# Patient Record
Sex: MALE | Race: OTHER | HISPANIC OR LATINO | Employment: UNEMPLOYED | URBAN - METROPOLITAN AREA
[De-identification: names, ages, dates, MRNs, and addresses within clinical notes are randomized per-mention and may not be internally consistent; named-entity substitution may affect disease eponyms.]

---

## 2017-12-17 ENCOUNTER — OFFICE VISIT (OUTPATIENT)
Dept: URGENT CARE | Facility: MEDICAL CENTER | Age: 37
End: 2017-12-17
Payer: COMMERCIAL

## 2017-12-17 PROCEDURE — G0382 LEV 3 HOSP TYPE B ED VISIT: HCPCS

## 2017-12-17 PROCEDURE — 99283 EMERGENCY DEPT VISIT LOW MDM: CPT

## 2017-12-18 NOTE — PROGRESS NOTES
Assessment  1  Trapezius strain (840 8) (C69 263A)   2  Lumbar radiculopathy (724 4) (M54 16)    Plan  Lumbar radiculopathy    · Cyclobenzaprine HCl - 10 MG Oral Tablet; Take 1 tablet twice daily   · PredniSONE 50 MG Oral Tablet; TAKE 1 TABLET DAILY AS DIRECTED    Discussion/Summary  Discussion Summary:   Symptoms are likely consistent with trapezius muscle spasm of the left side leading to shoulder pain and radicular symptoms  At this point I advised to use given medications as per instructions and apply heat locally  Follow up with PCP in 1 week  Patient's low back symptoms are likely consistent with left lumbar radiculopathy  Above medications are expected to help with the symptoms  Activities as tolerated and advised to follow up with your back doctor  Medication Side Effects Reviewed: Possible side effects of new medications were reviewed with the patient/guardian today  Understands and agrees with treatment plan: The treatment plan was reviewed with the patient/guardian  The patient/guardian understands and agrees with the treatment plan      Chief Complaint  1  Arm Pain   2  Neck Pain  Chief Complaint Free Text Note Form: Pt with complaints of pain left side of neck that radiates down left arm to hand for 4 days  Complaining of intermittant tingling of fingers of left hand  Describes pain as constant worse when raising arm  Denies injury  Also with complaints of left sided back pain since yesterday  History of Present Illness  HPI: Patient with past medical history of lumbar radiculopathy status post surgical repair coming in complaining of 4 day history of left neck and trapezius area which is going in the shoulder, left upper extremity along with numbness and tingling sensation of the fingers are as well  Denies any weakness of the hand   Denies any symptoms on the right side  Denies any injury or trauma and denies any fever or chills or vision changes  Patient also complains of pain in the lower back on the left side with some radiation to the groin  Denies any recent injury or trauma to the lower back  Denies any giving out sensation of the lower extremity  Denies any saddle anesthesia  Hospital Based Practices Required Assessment:  Pain Assessment  the patient states they have pain  The pain is located in the left shoulder  The patient describes the pain as aching  (on a scale of 0 to 10, the patient rates the pain at 9 )       Review of Systems  Focused-Male:  Constitutional: no fever or chills, feels well, no tiredness, no recent weight loss or weight gain  Active Problems  1  Asthma with acute exacerbation (493 92) (J45 901)   2  Chronic back pain greater than 3 months duration (724 5,338 29) (M54 9,G89 29)   3  Chronic low back pain (724 2,338 29) (M54 5,G89 29)   4  History of asthma (V12 69) (Z87 09)    Past Medical History  1  History of asthma (V12 69) (Z87 09)   2  History of sinusitis (V12 69) (Z87 09)    Family History  Father    1  Family history of Aplastic bone marrow   2  Family history of diabetes mellitus (V18 0) (Z83 3)    Social History   · Current every day smoker (305 1) (F17 200)   · Current every day smoker (305 1) (F17 200)   · Current every day smoker (305 1) (F17 200)   · No alcohol use   · No drug use    Surgical History  1  History of Back Surgery    Current Meds   1  No Reported Medications  Requested for: [id number] Recorded    Allergies  1  No Known Drug Allergies    Vitals  Signs   Recorded: [id number] 03:02PM   Temperature: 96 8 F  Heart Rate: 80  Respiration: 20  Systolic: 872  Diastolic: 76  Height: 5 ft 8 in  Weight: 149 lb   BMI Calculated: 22 66  BSA Calculated: 1 8  O2 Saturation: 100  Pain Scale: 9    Physical Exam   Constitutional  General appearance: No acute distress, well appearing and well nourished     Musculoskeletal  Gait and station: Normal    Inspection/palpation of joints, bones, and muscles: Abnormal  -- (Patient has mild to moderate restriction of active range of motion in all planes on the left side with moderate tightness and tenderness to palpation of trapezius muscle on the left side  Mild tightness and tenderness of paravertebral muscles of the C-spine on left side  Right-sided examination is relatively normal  There is mild localized tenderness to palpation of the coracoid process  Otherwise examination of subacromial bursa and rest of the shoulder is unremarkable  Negative Carmen and impingement  Hand , biceps and triceps 5/5  No tightness and tenderness to palpation of paravertebral muscles of the lumbar spine  Mild tenderness of left SI joint   DTR 2+ at knees and normal gait)      Signatures   Electronically signed by : DONTA Perdomo ; Dec 17 2017  4:36PM EST                       (Author)

## 2018-01-15 NOTE — MISCELLANEOUS
Provider Comments  Provider Comments:   Patient no showed appt today        Signatures   Electronically signed by : Michael Arenas; Feb 4 2016  1:24PM EST                       (Author)    Electronically signed by : Adelita Goldberg, M D ; Feb 4 2016  1:44PM EST

## 2018-01-23 VITALS
HEART RATE: 80 BPM | DIASTOLIC BLOOD PRESSURE: 76 MMHG | BODY MASS INDEX: 22.58 KG/M2 | RESPIRATION RATE: 20 BRPM | OXYGEN SATURATION: 100 % | TEMPERATURE: 96.8 F | WEIGHT: 149 LBS | HEIGHT: 68 IN | SYSTOLIC BLOOD PRESSURE: 134 MMHG

## 2019-07-07 ENCOUNTER — OFFICE VISIT (OUTPATIENT)
Dept: URGENT CARE | Age: 39
End: 2019-07-07
Payer: COMMERCIAL

## 2019-07-07 VITALS
WEIGHT: 147 LBS | SYSTOLIC BLOOD PRESSURE: 116 MMHG | TEMPERATURE: 97 F | HEIGHT: 68 IN | OXYGEN SATURATION: 97 % | DIASTOLIC BLOOD PRESSURE: 64 MMHG | BODY MASS INDEX: 22.28 KG/M2 | RESPIRATION RATE: 16 BRPM | HEART RATE: 64 BPM

## 2019-07-07 DIAGNOSIS — G89.29 OTHER CHRONIC PAIN: Primary | ICD-10-CM

## 2019-07-07 PROCEDURE — 99214 OFFICE O/P EST MOD 30 MIN: CPT | Performed by: PHYSICIAN ASSISTANT

## 2019-07-07 RX ORDER — OXYCODONE HYDROCHLORIDE 10 MG/1
10 TABLET ORAL EVERY 6 HOURS PRN
COMMUNITY
Start: 2016-01-16 | End: 2021-12-08 | Stop reason: ALTCHOICE

## 2019-07-07 RX ORDER — METHOCARBAMOL 500 MG/1
500 TABLET, FILM COATED ORAL 3 TIMES DAILY
Qty: 45 TABLET | Refills: 0 | Status: SHIPPED | OUTPATIENT
Start: 2019-07-07 | End: 2019-07-25 | Stop reason: SDUPTHER

## 2019-07-07 RX ORDER — PREGABALIN 150 MG/1
1 CAPSULE ORAL 2 TIMES DAILY
COMMUNITY
Start: 2016-01-07 | End: 2020-05-15 | Stop reason: SDUPTHER

## 2019-07-07 RX ORDER — MELOXICAM 15 MG/1
15 TABLET ORAL DAILY
Qty: 17 TABLET | Refills: 0 | Status: SHIPPED | OUTPATIENT
Start: 2019-07-07 | End: 2019-07-25 | Stop reason: SDUPTHER

## 2019-07-07 NOTE — PROGRESS NOTES
[facility]        NAME: Carmelina Mandel is a 44 y o  male  : 1980    MRN: [id number]  DATE: 2019  TIME: 6:12 PM    Assessment and Plan   Other chronic pain [G89 29]  1  Other chronic pain  Ambulatory referral to Physical Therapy    meloxicam (MOBIC) 15 mg tablet    methocarbamol (ROBAXIN) 500 mg tablet         Patient Instructions       Follow up with PCP in 3-5 days  Proceed to  ER if symptoms worsen  Chief Complaint     Chief Complaint   Patient presents with    Elbow Pain     Pt c/o left elbow pain  Pt states, "I think it may be tennis elbow "    Hip Pain     Pt c/o left hip pain radiating down left leg  Pt states, "I ran out of Lyrica "     History of Present Illness       44yo M p/w c/o increasing "chronic pain" since he ran out of his medicines  Patient states he was following at a pain practice when "doctor became sick and I needed to find a new doctor"  Pt claims pain doc gave him 6mo of refills on medicine so he could find new doctor  Patient has apt with new  [address]  [facility] on 19  Patient is requesting refills of all of his medicines until he can see PCP  Patient states pain doctor is no longer practicing and cannot refill the medicine  Patient states his new [facility]'s PCP told him to come here, but I cannot find documentation of this in his chart  Patient presented to  staff with chief complaint "I need my medicines refilled" and was told the urgent care does not refill chronic meds, as that is the role of a PCP  Patient then changed chief complaint to "I have severe chest pain that is radiating down arm"  After being rushed back to exam room, patient denied presence of chest pain and stated he only mentioned that he has asthma to   Patient denies this upon evaluation by PA and denies any chest pain  Patient states pain is located in left lower back and radiates down left leg into thigh  Pain is sharp and 10/10 in intensity   Patient denies numbness, weakness, tingling  Patient believes his work exacerbates his pain  Patient states he cannot take otc medicines because "they do not work for him"  Review of Systems   Review of Systems   Constitutional: Negative for activity change, appetite change, chills, diaphoresis, fatigue, fever and unexpected weight change  Respiratory: Negative for apnea, cough, choking, chest tightness, shortness of breath, wheezing and stridor  Cardiovascular: Negative for chest pain, palpitations and leg swelling  Musculoskeletal: Positive for arthralgias and back pain  Negative for gait problem, joint swelling, myalgias, neck pain and neck stiffness  Skin: Negative for color change, pallor, rash and wound  Current Medications       Current Outpatient Medications:     Cetirizine HCl (ZYRTEC ALLERGY PO), Take by mouth , Disp: , Rfl:     fluticasone (FLONASE) 50 mcg/act nasal spray, 1 spray into each nostril daily  , Disp: , Rfl:     oxyCODONE (ROXICODONE) 10 MG TABS, Take by mouth, Disp: , Rfl:     ALBUTEROL IN, Inhale , Disp: , Rfl:     gabapentin (NEURONTIN) 600 MG tablet, Take 600 mg by mouth 3 (three) times a day , Disp: , Rfl:     meloxicam (MOBIC) 15 mg tablet, Take 15 mg by mouth daily  , Disp: , Rfl:     meloxicam (MOBIC) 15 mg tablet, Take 1 tablet (15 mg total) by mouth daily for 17 days, Disp: 17 tablet, Rfl: 0    methocarbamol (ROBAXIN) 500 mg tablet, Take 1 tablet (500 mg total) by mouth 3 (three) times a day for 15 days, Disp: 45 tablet, Rfl: 0    methocarbamol (ROBAXIN) 750 mg tablet, Take 750 mg by mouth 3 (three) times a day , Disp: , Rfl:     pregabalin (LYRICA) 150 mg capsule, Take 1 capsule by mouth 2 (two) times a day, Disp: , Rfl:     Current Allergies     Allergies as of 07/07/2019 - Reviewed 07/07/2019   Allergen Reaction Noted    Shellfish-derived products Anaphylaxis 01/11/2016            The following portions of the patient's history were reviewed and updated as appropriate: allergies, current medications, past family history, past medical history, past social history, past surgical history and problem list      Past Medical History:   Diagnosis Date    Asthma     Chronic pain     back       Past Surgical History:   Procedure Laterality Date    BACK SURGERY         History reviewed  No pertinent family history  Medications have been verified  Objective   /64   Pulse 64   Temp (!) 97 °F (36 1 °C) (Tympanic)   Resp 16   Ht 5' 8" (1 727 m)   Wt 66 7 kg (147 lb)   SpO2 97%   BMI 22 35 kg/m²        Physical Exam     Physical Exam   Constitutional: He appears well-developed and well-nourished  Cardiovascular: Normal rate, regular rhythm and normal heart sounds  Exam reveals no gallop and no friction rub  No murmur heard  Pulmonary/Chest: Effort normal and breath sounds normal  No stridor  No respiratory distress  He has no wheezes  He has no rales  Abdominal: Soft  Bowel sounds are normal  He exhibits no distension and no mass  There is no tenderness  There is no guarding  Musculoskeletal:        Lumbar back: He exhibits tenderness and pain  He exhibits normal range of motion, no bony tenderness, no swelling, no edema, no deformity, no laceration, no spasm and normal pulse  Neurological: He is alert  He has normal strength  He is not disoriented  He displays normal reflexes  No cranial nerve deficit or sensory deficit  He displays a negative Romberg sign

## 2019-07-25 ENCOUNTER — OFFICE VISIT (OUTPATIENT)
Dept: FAMILY MEDICINE CLINIC | Facility: CLINIC | Age: 39
End: 2019-07-25
Payer: COMMERCIAL

## 2019-07-25 VITALS
HEART RATE: 70 BPM | RESPIRATION RATE: 16 BRPM | WEIGHT: 152.7 LBS | DIASTOLIC BLOOD PRESSURE: 72 MMHG | TEMPERATURE: 98 F | BODY MASS INDEX: 23.14 KG/M2 | OXYGEN SATURATION: 97 % | SYSTOLIC BLOOD PRESSURE: 100 MMHG | HEIGHT: 68 IN

## 2019-07-25 DIAGNOSIS — G89.29 OTHER CHRONIC PAIN: ICD-10-CM

## 2019-07-25 DIAGNOSIS — G89.29 CHRONIC BACK PAIN GREATER THAN 3 MONTHS DURATION: Primary | ICD-10-CM

## 2019-07-25 DIAGNOSIS — M77.11 LATERAL EPICONDYLITIS OF BOTH ELBOWS: ICD-10-CM

## 2019-07-25 DIAGNOSIS — M54.9 CHRONIC BACK PAIN GREATER THAN 3 MONTHS DURATION: Primary | ICD-10-CM

## 2019-07-25 DIAGNOSIS — M77.12 LATERAL EPICONDYLITIS OF BOTH ELBOWS: ICD-10-CM

## 2019-07-25 DIAGNOSIS — J44.9 CHRONIC OBSTRUCTIVE PULMONARY DISEASE, UNSPECIFIED COPD TYPE (HCC): ICD-10-CM

## 2019-07-25 DIAGNOSIS — M54.40 ACUTE BILATERAL LOW BACK PAIN WITH SCIATICA, SCIATICA LATERALITY UNSPECIFIED: ICD-10-CM

## 2019-07-25 PROBLEM — M54.16 LUMBAR RADICULOPATHY: Status: ACTIVE | Noted: 2017-12-17

## 2019-07-25 PROCEDURE — 99203 OFFICE O/P NEW LOW 30 MIN: CPT | Performed by: NURSE PRACTITIONER

## 2019-07-25 RX ORDER — MELOXICAM 15 MG/1
15 TABLET ORAL DAILY
Qty: 17 TABLET | Refills: 0 | Status: SHIPPED | OUTPATIENT
Start: 2019-07-25 | End: 2020-05-08 | Stop reason: ALTCHOICE

## 2019-07-25 RX ORDER — METHOCARBAMOL 500 MG/1
500 TABLET, FILM COATED ORAL 3 TIMES DAILY
Qty: 45 TABLET | Refills: 0 | Status: SHIPPED | OUTPATIENT
Start: 2019-07-25 | End: 2020-05-08 | Stop reason: ALTCHOICE

## 2019-07-25 NOTE — PATIENT INSTRUCTIONS
COPD (Chronic Obstructive Pulmonary Disease)   WHAT YOU NEED TO KNOW:   Chronic obstructive pulmonary disease (COPD) is a lung disease that makes it hard for you to breathe  It is usually a result of lung damage caused by years of irritation and inflammation in your lungs  DISCHARGE INSTRUCTIONS:   Call 911 if:   · You feel lightheaded, short of breath, and have chest pain  Return to the emergency department if:   · You are confused, dizzy, or feel faint  · Your arm or leg feels warm, tender, and painful  It may look swollen and red  · You cough up blood  Contact your healthcare provider if:   · You have more shortness of breath than usual      · You need more medicine than usual to control your symptoms  · You are coughing or wheezing more than usual      · You are coughing up more mucus, or it is a different color or has a different odor  · You gain more than 3 pounds in a week  · You have a fever, a runny or stuffy nose, and a sore throat, or other cold or flu symptoms  · Your skin, lips, or nails start to turn blue  · You have swelling in your legs or ankles  · You are very tired or weak for more than a day  · You notice changes in your mood, or changes in your ability to think or concentrate  · You have questions or concerns about your condition or care  Medicines:   · Medicines  may be used to open your airways, decrease swelling and inflammation in your lungs, or treat an infection  You may need 2 or more medicines  A short-acting medicine relieves symptoms quickly  Long-acting medicines will control or prevent symptoms  Ask for more information about the medicines you are given and how to use them safely  · Take your medicine as directed  Contact your healthcare provider if you think your medicine is not helping or if you have side effects  Tell him or her if you are allergic to any medicine  Keep a list of the medicines, vitamins, and herbs you take  Include the amounts, and when and why you take them  Bring the list or the pill bottles to follow-up visits  Carry your medicine list with you in case of an emergency  Help make breathing easier:   · Use pursed-lip breathing any time you feel short of breath  Take a deep breath in through your nose  Slowly breathe out through your mouth with your lips pursed for twice as long as you inhaled  You can also practice this breathing pattern while you bend, lift, climb stairs, or exercise  It slows down your breathing and helps move more air in and out of your lungs  · Do not smoke, and avoid others who smoke  Nicotine and other substances can cause lung irritation or damage and make it harder for you to breathe  Do not use e-cigarettes or smokeless tobacco  They still contain nicotine  Ask your healthcare provider for information if you currently smoke and need help to quit  For support and more information:  ¨ [employer]  Phone: 4- 552 - 337-4423  Web Address: [place]      · Be aware of and avoid anything that makes your symptoms worse  Stay out of high altitudes and places with high humidity  Stay inside, or cover your mouth and nose with a scarf when you are outside during cold weather  Stay inside on days when air pollution or pollen counts are high  Do not use aerosol sprays such as deodorant, bug spray, and hair spray  Manage COPD and help prevent exacerbations:  COPD is a serious condition that gets worse over time  A COPD exacerbation means your symptoms suddenly get worse  It is important to prevent exacerbations  An exacerbation can cause more lung damage  COPD cannot be cured, but you can take action to feel better and prevent COPD exacerbations:  · Protect yourself from germs  Germs can get into your lungs and cause an infection  An infection in your lungs can create more mucus and make it harder to breathe   An infection can also create swelling in your airways and prevent air from getting in  You can decrease your risk for infection by doing the following:     [facility] your hands often with soap and water  Carry germ-killing gel with you  You can use the gel to clean your hands when soap and water are not available  ¨ Do not touch your eyes, nose, or mouth unless you have washed your hands first      ¨ Always cover your mouth when you cough  Cough into a tissue or your shirtsleeve so you do not spread germs from your hands  ¨ Try to avoid people who have a cold or the flu  If you are sick, stay away from others as much as possible  · Drink more liquids  This will help to keep your air passages moist and help you cough up mucus  Ask how much liquid to drink each day and which liquids are best for you  · Exercise daily  Exercise for at least 20 minutes each day to help increase your energy and decrease shortness of breath  Walking or riding a bike are good ways to exercise  Talk to your healthcare provider about the best exercise plan for you  · Ask about vaccines  Your healthcare provider may recommend that you get regular flu and pneumonia vaccines  Pneumonia can become life-threatening for a person who has COPD  Ask about other vaccines you may need  Ask your healthcare provider about the flu and pneumonia vaccines  All adults should get the flu (influenza) vaccine every year as soon as it becomes available  The pneumonia vaccine is given to adults aged 72 or older to prevent pneumococcal disease, such as pneumonia  Adults aged 23 to 59 years who are at high risk for pneumococcal disease also should get the pneumococcal vaccine  It may need to be repeated 1 or 5 years later  Pulmonary rehabilitation:  Your healthcare provider may recommend a program to help you manage your symptoms and improve your quality of life  It may include nutritional counseling and exercise to strengthen your lungs     Make decisions about your choices for future treatment:  Ask for information about advanced medical directives and living carranza  These documents help you decide and write down your choices for treatment and end-of-life care  It is best to complete them when you feel well and can think clearly about your wishes  The information can then be kept for future use if you are in the hospital or become very ill  Follow up with your healthcare provider as directed: You may need more tests  Your healthcare provider may refer you to a pulmonary (lung) specialist  Write down your questions so you remember to ask them during your visits  © 2017 [facility] Information is for End User's use only and may not be sold, redistributed or otherwise used for commercial purposes  All illustrations and images included in CareNotes® are the copyrighted property of A D A M , Inc  or Paulie Callahan  The above information is an  only  It is not intended as medical advice for individual conditions or treatments  Talk to your doctor, nurse or pharmacist before following any medical regimen to see if it is safe and effective for you

## 2019-07-25 NOTE — PROGRESS NOTES
Subjective:   Chief Complaint   Patient presents with    Establish Care     Bylateral pain on arms         Patient ID: Norah Renner is a 44 y o  male  Presents today to establish care and for referral to pain management  He has had significant issues with his back and multiple back surgeries resulting in chronic back pain  He is currently on Lyrica and was taking meloxicam and methocarbamol  Unfortunately he lost his insurance and it has now been reinstated  Will refill Lyrica meloxicam and methocarbamol and refer to pain management for his chronic back pain  He also is having elbow pain both sides with some numbness and tingling down his arms  The following portions of the patient's history were reviewed and updated as appropriate: allergies, current medications, past family history, past medical history, past social history, past surgical history and problem list     Review of Systems   Constitutional: Negative for chills, fatigue and fever  Respiratory: Positive for cough, shortness of breath and wheezing  Cardiovascular: Negative for chest pain, palpitations and leg swelling  Musculoskeletal: Positive for arthralgias (bilateral elbows) and back pain (chronic post operative pain)  Negative for gait problem  Psychiatric/Behavioral: Negative for dysphoric mood  The patient is not nervous/anxious  Objective:  Vitals:    07/25/19 1001   BP: 100/72   BP Location: Left arm   Patient Position: Sitting   Cuff Size: Large   Pulse: 70   Resp: 16   Temp: 98 °F (36 7 °C)   TempSrc: Temporal   SpO2: 97%   Weight: 69 3 kg (152 lb 11 2 oz)   Height: 5' 8" (1 727 m)      Physical Exam   Constitutional: He is oriented to person, place, and time  He appears well-developed and well-nourished  Eyes: Pupils are equal, round, and reactive to light  Conjunctivae and EOM are normal  Right eye exhibits no discharge  Left eye exhibits no discharge  Neck: Normal range of motion  Neck supple   No thyromegaly present  Cardiovascular: Normal rate, regular rhythm, normal heart sounds and intact distal pulses  Pulmonary/Chest: Effort normal  No respiratory distress  Bilateral coarse breath sounds with prolonged expiratory phase   Lymphadenopathy:     He has no cervical adenopathy  Neurological: He is alert and oriented to person, place, and time  Skin: Skin is warm and dry  Psychiatric: He has a normal mood and affect  His behavior is normal          Assessment/Plan:    No problem-specific Assessment & Plan notes found for this encounter  Diagnoses and all orders for this visit:    Chronic back pain greater than 3 months duration  -     Ambulatory referral to Pain Management; Future    Acute bilateral low back pain with sciatica, sciatica laterality unspecified  -     Ambulatory referral to Pain Management; Future    Lateral epicondylitis of both elbows  -     Ambulatory referral to Orthopedic Surgery; Future    Chronic obstructive pulmonary disease, unspecified COPD type (HCC)  -     Albuterol Sulfate (PROAIR RESPICLICK) 853 (90 Base) MCG/ACT AEPB; Inhale 2 puffs every 6 (six) hours as needed (wheezing)  -     mometasone-formoterol (DULERA) 100-5 MCG/ACT inhaler; Inhale 2 puffs 2 (two) times a day Rinse mouth after use  Other chronic pain  -     meloxicam (MOBIC) 15 mg tablet; Take 1 tablet (15 mg total) by mouth daily for 17 days  -     methocarbamol (ROBAXIN) 500 mg tablet;  Take 1 tablet (500 mg total) by mouth 3 (three) times a day for 15 days

## 2019-08-07 ENCOUNTER — APPOINTMENT (OUTPATIENT)
Dept: RADIOLOGY | Facility: MEDICAL CENTER | Age: 39
End: 2019-08-07
Payer: COMMERCIAL

## 2019-08-07 VITALS
HEIGHT: 68 IN | SYSTOLIC BLOOD PRESSURE: 133 MMHG | DIASTOLIC BLOOD PRESSURE: 88 MMHG | BODY MASS INDEX: 23.95 KG/M2 | HEART RATE: 71 BPM | WEIGHT: 158 LBS

## 2019-08-07 DIAGNOSIS — R20.0 NUMBNESS AND TINGLING IN BOTH HANDS: ICD-10-CM

## 2019-08-07 DIAGNOSIS — M54.2 NECK PAIN: ICD-10-CM

## 2019-08-07 DIAGNOSIS — M77.12 LATERAL EPICONDYLITIS OF BOTH ELBOWS: ICD-10-CM

## 2019-08-07 DIAGNOSIS — R20.2 NUMBNESS AND TINGLING IN BOTH HANDS: ICD-10-CM

## 2019-08-07 DIAGNOSIS — M77.11 LATERAL EPICONDYLITIS OF BOTH ELBOWS: ICD-10-CM

## 2019-08-07 DIAGNOSIS — M77.11 LATERAL EPICONDYLITIS OF RIGHT ELBOW: ICD-10-CM

## 2019-08-07 DIAGNOSIS — M77.12 LATERAL EPICONDYLITIS OF LEFT ELBOW: Primary | ICD-10-CM

## 2019-08-07 PROCEDURE — 99244 OFF/OP CNSLTJ NEW/EST MOD 40: CPT | Performed by: FAMILY MEDICINE

## 2019-08-07 PROCEDURE — 73080 X-RAY EXAM OF ELBOW: CPT

## 2019-08-07 RX ORDER — PREGABALIN 150 MG/1
150 CAPSULE ORAL 4 TIMES DAILY
COMMUNITY
Start: 2019-08-05 | End: 2021-12-08 | Stop reason: ALTCHOICE

## 2019-08-07 NOTE — PROGRESS NOTES
1  Lateral epicondylitis of left elbow  [facility]   2  Lateral epicondylitis of both elbows  Ambulatory referral to Orthopedic Surgery    XR elbow 3+ vw left    XR elbow 3+ vw right   3  Lateral epicondylitis of right elbow  [facility]   4  Neck pain  [facility]   5  Numbness and tingling in both hands       Orders Placed This Encounter   Procedures    XR elbow 3+ vw left    XR elbow 3+ vw right    [facility]        Imaging Studies (I personally reviewed images in PACS and report):   x-ray right elbow 08/07/2019: No acute osseous abnormality  X-ray left elbow 08/07/2019: No acute osseous abnormality      IMPRESSION:   bilateral left greater than right lateral epicondylitis  Bilateral hand numbness and tingling   neck pain   History of lumbar spine surgeon and cervical arthritis    Differential diagnosis:   Carpal tunnel syndrome  radiculopathy      Repeat X-ray next visit:    none      Return in about 6 weeks (around 9/18/2019)  Patient Instructions   Tennis elbow (lateral epicondylitis) or its cousin, Golfers elbow (medial epicondyltitis), are irritations and inflammations of the anchor points of your forearm muscles at your elbow  Tennis elbow is usually caused by overuse, extending your wrist (pushing wrist up) and golfers elbow by flexion of the wrist (pushing wrist down)  This takes at least 6 weeks to heal and usually up to 3 months to see complete resolution  Injections help to reduce pain significantly but the pain will return if you do not perform physical therapy  The curative treatment is physical therapy  Injections, tennis elbow straps, and anti-inflammatories only help to reduce pain  This is a clinical diagnosis and may require xray but MRI is usually not considered until at least 3 months of failed treatment  Surgery is generally reserved for cases which do not improve after 6 months of conservative treatment  (JERED Miller 2017)              CHIEF COMPLAINT:  Bilateral elbow pain     HPI:  Shea Lebron is a 44 y o  male  who presents for       Visit 08/07/2019:    Consultation from Stephenie Rasheed for   Lateral epicondylitis both elbows    patient complains of bilateral elbow pain ongoing for 2-3 months with progressive worsening without specific injury event  Denies trauma  Points to lateral aspect of his elbow at the lateral epicondyle as source his pain  Left greater than right  Moderate intensity achy sore exacerbated by extension of the wrist and movement  Patient has 2 jobs in 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 [employer] and demonstrates repetitive wrist extension and lifting in examination room as a part of his job duties  Associated symptoms do include numbness and tingling bilateral into the fingers thumb index and middle  Denies any injury to the upper extremities or neck  States he does have a history of cervical arthritis at in the past   Associated symptoms also include neck pain stiffness and soreness in the upper traps  Denies any radiation of pain from neck down to arms  summary of CRNP primary care note 07/25/2019:   Establish care and requesting referral Pain Management  Patient has chronic back pain  Also complained of elbow pain bilateral with associated numbness and tingling down arms  Multiple referrals made  Patient continued on meloxicam and Robaxin  Patient referred to Orthopedics for further evaluation of epicondylitis  Review of Systems   Constitutional: Negative for chills and fever  HENT: Negative for hearing loss and sore throat  Eyes: Negative for visual disturbance  Respiratory: Negative for cough and shortness of breath  Cardiovascular: Negative for chest pain and palpitations  Gastrointestinal: Negative for abdominal pain and nausea  Genitourinary: Negative for difficulty urinating and dysuria  Neurological: Negative for dizziness, weakness and numbness  Psychiatric/Behavioral: Negative for suicidal ideas  Following history reviewed and update:    Past Medical History:   Diagnosis Date    Asthma     Chronic pain     back     Past Surgical History:   Procedure Laterality Date    BACK SURGERY       Social History   Social History     Substance and Sexual Activity   Alcohol Use No     Social History     Substance and Sexual Activity   Drug Use Not Currently    Types: Marijuana    Comment: patient had medical marijuana      Social History     Tobacco Use   Smoking Status Current Every Day Smoker    Packs/day: 0 25    Types: Cigarettes   Smokeless Tobacco Never Used     Family History   Problem Relation Age of Onset    Cancer Mother     Cancer Father     Diabetes Father     Diabetes Sister     No Known Problems Maternal Grandmother     No Known Problems Maternal Grandfather     No Known Problems Paternal Grandmother     Diabetes Paternal Grandfather      Allergies   Allergen Reactions    Shellfish-Derived Products Anaphylaxis          Physical Exam  /88   Pulse 71   Ht 5' 8" (1 727 m)   Wt 71 7 kg (158 lb)   BMI 24 02 kg/m²     Constitutional:  see vital signs  Gen: well-developed, normocephalic/atraumatic, well-groomed  Eyes: No inflammation or discharge of conjunctiva or lids; sclera clear   Pharynx: no inflammation, lesion, or mass of lips  Neck: supple, no masses, non-distended  MSK: no inflammation, lesion, mass, or clubbing of nails and digits except for other than mentioned below  SKIN: no visible rashes or skin lesions  Pulmonary/Chest: Effort normal  No respiratory distress  NEURO: cranial nerves grossly intact  PSYCH:  Alert and oriented to person, place, and time; recent and remote memory intact; mood normal, no depression, anxiety, or agitation, judgment and insight good and intact     Right Elbow Exam     Tenderness   The patient is experiencing tenderness in the lateral epicondyle       Range of Motion   The patient has normal right elbow ROM  Muscle Strength   The patient has normal right elbow strength  Other   Erythema: absent  Scars: absent  Sensation: normal    Comments:  Chief complaint of pain reproduced at lateral epicondyle with resisted isometric contraction of wrist and finger extensors      Left Elbow Exam     Tenderness   The patient is experiencing tenderness in the lateral epicondyle  Range of Motion   The patient has normal left elbow ROM  Muscle Strength   Pronation:  5/5   Supination:  5/5     Other   Erythema: absent  Sensation: normal    Comments:  Chief complaint of pain reproduced at lateral epicondyle with resisted isometric contraction of wrist and finger extensors            Cervical  ROM: intact  Tenderness: no spinous process tenderness;   Sensation UE Bilateral:    Diminished sensation bilateral volar thumb index and middle finger  All other dermatomes intact    Strength UE: 5/5 elbow, wrist, fingers bilatearl  Reflexes: symmetric bilateral triceps, biceps, corachobrachiais    Negative Tinel's carpal tunnel, cubital tunnel bilateral        Procedures

## 2019-08-07 NOTE — LETTER
August 7, 2019     Patient: Lis Avery   YOB: 1980   Date of Visit: 8/7/2019       To Whom it May Concern:    Darvin Calvert is under my professional care  He was seen in my office on 8/7/2019  He may return to work on 8/12/19 for [address]  I recommend patient transition to another roll at his job to avoid repetitive extension of his wrist which is causing chronic injury  Continuation of this motion will result in lack of healing and worsening disability  If you have any questions or concerns, please don't hesitate to call  Sincerely,          Kaylyn Kendrick III, DO        CC: Jayjay Whitney

## 2019-08-07 NOTE — PATIENT INSTRUCTIONS
Tennis elbow (lateral epicondylitis) or its cousin, Golfers elbow (medial epicondyltitis), are irritations and inflammations of the anchor points of your forearm muscles at your elbow  Tennis elbow is usually caused by overuse, extending your wrist (pushing wrist up) and golfers elbow by flexion of the wrist (pushing wrist down)  This takes at least 6 weeks to heal and usually up to 3 months to see complete resolution  Injections help to reduce pain significantly but the pain will return if you do not perform physical therapy  The curative treatment is physical therapy  Injections, tennis elbow straps, and anti-inflammatories only help to reduce pain  This is a clinical diagnosis and may require xray but MRI is usually not considered until at least 3 months of failed treatment  Surgery is generally reserved for cases which do not improve after 6 months of conservative treatment  (JERED Miller 2017)

## 2019-08-14 ENCOUNTER — TELEPHONE (OUTPATIENT)
Dept: OBGYN CLINIC | Facility: HOSPITAL | Age: 39
End: 2019-08-14

## 2019-08-14 NOTE — TELEPHONE ENCOUNTER
Patient may return for non ultrasound guided injection any day I have office hours as appropriate either at [facility] or [facility]  Please fit them in accordingly

## 2019-08-14 NOTE — TELEPHONE ENCOUNTER
Patient called to get in for an injection  jerardo him scheduled for 8/28/19  Patient stated if he needed an injection that he just needed to call and he could get right in  This was the first avail that I had to fit his schedule  You do not have any other day that is after 3 pm to fit him in do you?

## 2019-08-21 ENCOUNTER — EVALUATION (OUTPATIENT)
Dept: PHYSICAL THERAPY | Facility: CLINIC | Age: 39
End: 2019-08-21
Payer: COMMERCIAL

## 2019-08-21 DIAGNOSIS — M77.12 LATERAL EPICONDYLITIS OF LEFT ELBOW: Primary | ICD-10-CM

## 2019-08-21 DIAGNOSIS — M77.11 LATERAL EPICONDYLITIS OF RIGHT ELBOW: ICD-10-CM

## 2019-08-21 PROCEDURE — 97162 PT EVAL MOD COMPLEX 30 MIN: CPT | Performed by: PHYSICAL THERAPIST

## 2019-08-21 PROCEDURE — 97140 MANUAL THERAPY 1/> REGIONS: CPT | Performed by: PHYSICAL THERAPIST

## 2019-08-21 NOTE — PROGRESS NOTES
PT Evaluation     Today's date: 2019  Patient name: Catalina Link  : 1980  MRN: [id number]  Referring provider: Erica Lozano  Dx:   Encounter Diagnosis     ICD-10-CM    1  Lateral epicondylitis of left elbow M77 12    2  Lateral epicondylitis of right elbow M77 11                   Assessment  Assessment details: Pt is a pleasant 44 y o  male presenting to outpatient physical therapy with Lateral epicondylitis of left elbow  (primary encounter diagnosis)  Lateral epicondylitis of right elbow   Pt presents with pain, decreased range of motion, decreased strength, and decreased tolerance to activity  Displays movement impairment diagnoses of bilateral wrist extensor hypomobility dysfunction, consistent with pathoanatomical diagnosis of lateral epicondylitis  Pt educated in ice massage to lateral elbows and in use of bracing  Pt is a good candidate for outpatient physical therapy and would benefit from skilled physical therapy to address limitations and to achieve goals  Thank you for this referral    Impairments: abnormal coordination, abnormal or restricted ROM, activity intolerance, impaired physical strength and pain with function  Understanding of Dx/Px/POC: good   Prognosis: good    Goals  ST  Patient will report 25% decrease in pain in 4 weeks  2  Patient will demonstrate 25% improvement in ROM in 4 weeks  3  Patient will demonstrate 1/2 grade improvement in strength in 4 weeks  LT  Patient will be able to perform IADLS without restriction or pain by discharge  2  Patient will be independent in HEP by discharge  3  Patient will be able to return to recreational/work duties without restriction or pain by discharge        Plan  Patient would benefit from: PT eval and skilled PT  Planned modality interventions: cryotherapy and thermotherapy: hydrocollator packs  Planned therapy interventions: IADL retraining, body mechanics training, flexibility, functional ROM exercises, home exercise program, neuromuscular re-education, manual therapy, postural training, strengthening, stretching, therapeutic activities, therapeutic exercise and joint mobilization  Frequency: 2x week  Duration in visits: 8  Duration in weeks: 4  Treatment plan discussed with: patient        Subjective Evaluation    History of Present Illness  Mechanism of injury: : Pt reported he was pulling a heavy canister at work when he developed right elbow pain, progressing to left side, stating injury occurred several months ago  Reports he consulted orthopedic physician, noting he has steroid injections scheduled next week (2019)  Follow up office visit scheduled for 19  Reports he was given wrist and forearm strap 1 5 weeks ago at orthopedic's office for left elbow, denies bracing for right side  States symptom AGGs are: squeezing or making a fist, lifting any item (cup of water, gallon of milk)  States he has continued to perform regular work duties, noting discomfort with performing paperwork  States symptoms are located over lateral elbows, described as aching or stabbing  Denies paresthesias  Reports symptoms are worse on left  Reports symptom EASES are: medication, which he takes for low back  Pain  Current pain ratin  At best pain ratin  At worst pain ratin    Patient Goals  Patient goals for therapy: decreased pain, return to sport/leisure activities and independence with ADLs/IADLs  Patient goal: improve gripping/squeezing tolerance        Objective     Palpation   Left   Tenderness of the wrist extensors  Right   Tenderness of the wrist extensors  Tenderness     Left Elbow   Tenderness in the lateral epicondyle  Right Elbow   No tenderness in the lateral epicondyle  Left Wrist/Hand   Tenderness in the common extensor tendon and lateral epicondyle  Right Wrist/Hand   No tenderness in the common extensor tendon and lateral epicondyle       Active Range of Motion Left Elbow   Forearm supination: 85 degrees   Forearm pronation: 85 degrees     Right Elbow   Forearm supination: 85 degrees with pain  Forearm pronation: 108 degrees     Left Wrist   Wrist flexion: 58 degrees   Wrist extension: 55 degrees with pain  Radial deviation: 40 degrees   Ulnar deviation: 22 degrees     Right Wrist   Wrist flexion: 68 degrees   Wrist extension: 70 degrees with pain  Radial deviation: 22 degrees   Ulnar deviation: 38 degrees     Passive Range of Motion     Left Wrist   Wrist flexion: 90 degrees   Wrist extension: 85 degrees   Radial deviation: 44 degrees   Ulnar deviation: 40 degrees     Right Wrist   Wrist flexion: 90 degrees   Wrist extension: 90 degrees   Radial deviation: 30 degrees   Ulnar deviation: 50 degrees     Strength/Myotome Testing     Left Wrist/Hand   Wrist extension: 3-  Wrist flexion: 4-  Radial deviation: 3-  Ulnar deviation: 3-     (2nd hand position)     Trial 1: 48    Trial 2: 40    Trial 3: 40    Right Wrist/Hand   Wrist extension: 4+  Wrist flexion: 4+  Radial deviation: 4  Ulnar deviation: 4     (2nd hand position)     Trial 1: 75    Trial 2: 85    Trial 3: 80    Additional Strength Details  8/21: Left MMT strength and  strength limited secondary to pain over CET     Tests     Left Elbow   Positive Cozen's and Maudsley's  Negative Mill's  Right Elbow   Negative Cozen's, Maudsley's and Mill's                Precautions: h/o L5-S1 fusion    Date 8/21            FOTO IE             Re-eval IE              Daily Treatment Diary     Manual  8/21            MFDc to CET RICHARD                                                                    Exercise Diary  8/21            MHP pre-tx                          Eccentric wrist ext 3#            Wrist extension stretch             Therabar sup & pron             Therabar wrist flex & ext (twist)                          Powerweb finger flex & ext             Elbow flex AROM 3-way 5# NV Modalities  8/21            CP post-tx

## 2019-08-26 ENCOUNTER — OFFICE VISIT (OUTPATIENT)
Dept: FAMILY MEDICINE CLINIC | Facility: CLINIC | Age: 39
End: 2019-08-26
Payer: COMMERCIAL

## 2019-08-26 VITALS
DIASTOLIC BLOOD PRESSURE: 80 MMHG | HEIGHT: 68 IN | HEART RATE: 72 BPM | BODY MASS INDEX: 24.07 KG/M2 | RESPIRATION RATE: 16 BRPM | TEMPERATURE: 97.6 F | SYSTOLIC BLOOD PRESSURE: 106 MMHG | OXYGEN SATURATION: 98 % | WEIGHT: 158.8 LBS

## 2019-08-26 DIAGNOSIS — Z86.39 HISTORY OF HIGH CHOLESTEROL: ICD-10-CM

## 2019-08-26 DIAGNOSIS — Z00.00 WELL ADULT EXAM: Primary | ICD-10-CM

## 2019-08-26 DIAGNOSIS — E55.9 VITAMIN D DEFICIENCY: ICD-10-CM

## 2019-08-26 PROCEDURE — 99395 PREV VISIT EST AGE 18-39: CPT | Performed by: NURSE PRACTITIONER

## 2019-08-26 NOTE — PROGRESS NOTES
Subjective     Ghazal Barroso is a 44 y o   male and is here for routine health maintenance  The patient reports bilateral epicondylitis    History of Present Illness     HPI    Well Adult Physical   Patient here for a comprehensive physical exam       Diet and Physical Activity  Diet: no vegetables  Weight concerns: None, patient's BMI is between 18 5-24 9  Exercise: daily      Depression Screen  PHQ-9 Depression Screening    PHQ-9:    Frequency of the following problems over the past two weeks:               General Health  Hearing: Normal:  bilateral  Vision: no vision problems, goes for regular eye exams, most recent eye exam >1 year and wears glasses  Dental: no dental visits for >1 year, brushes teeth twice daily and flosses teeth occasionally        Smoker yes 1 ppd for 25 years  Annual screening with low-dose helical computed tomography (CT) for patients age 54 to 76 years with history of smoking at least 30 pack-years and, if a former smoker, had quit within the previous 15 years      The following portions of the patient's history were reviewed and updated as appropriate: allergies, current medications, past family history, past medical history, past social history, past surgical history and problem list     Review of Systems     Review of Systems   Constitutional: Negative for chills, fatigue and fever  HENT: Positive for congestion and sinus pressure  Negative for ear pain, postnasal drip, rhinorrhea and sore throat  Eyes: Negative for pain and visual disturbance  Respiratory: Positive for cough  Negative for shortness of breath and wheezing  Cardiovascular: Negative for chest pain, palpitations and leg swelling  Gastrointestinal: Negative for abdominal pain, blood in stool, constipation, diarrhea, nausea and vomiting  Endocrine: Negative for cold intolerance, heat intolerance, polydipsia, polyphagia and polyuria     Genitourinary: Negative for discharge, dysuria, flank pain, frequency, scrotal swelling, testicular pain and urgency  Musculoskeletal: Positive for back pain  Negative for arthralgias and gait problem  Skin: Negative for rash  Allergic/Immunologic: Negative for environmental allergies and food allergies  Neurological: Negative for dizziness and headaches  Hematological: Does not bruise/bleed easily  Psychiatric/Behavioral: Negative for dysphoric mood  The patient is not nervous/anxious  Past Medical History     Past Medical History:   Diagnosis Date    Asthma     Chronic pain     back       Past Surgical History     Past Surgical History:   Procedure Laterality Date    BACK SURGERY      LUMBAR DISCECTOMY  2015    & fusion;  L5-S1       Social History     Social History     Socioeconomic History    Marital status: Single     Spouse name: None    Number of children: None    Years of education: None    Highest education level: None   Occupational History    None   Social Needs    Financial resource strain: None    Food insecurity:     Worry: None     Inability: None    Transportation needs:     Medical: None     Non-medical: None   Tobacco Use    Smoking status: Current Every Day Smoker     Packs/day: 0 25     Types: Cigarettes    Smokeless tobacco: Never Used   Substance and Sexual Activity    Alcohol use: No    Drug use: Not Currently     Types: Marijuana     Comment: patient had medical marijuana     Sexual activity: None   Lifestyle    Physical activity:     Days per week: None     Minutes per session: None    Stress: None   Relationships    Social connections:     Talks on phone: None     Gets together: None     Attends Restorationist service: None     Active member of club or organization: None     Attends meetings of clubs or organizations: None     Relationship status: None    Intimate partner violence:     Fear of current or ex partner: None     Emotionally abused: None     Physically abused: None     Forced sexual activity: None   Other Topics Concern    None   Social History Narrative    fhx not reviewed intriage       Family History     Family History   Problem Relation Age of Onset    Cancer Mother     Cancer Father     Diabetes Father     Diabetes Sister     No Known Problems Maternal Grandmother     No Known Problems Maternal Grandfather     No Known Problems Paternal Grandmother     Diabetes Paternal Grandfather        Current Medications       Current Outpatient Medications:     ALBUTEROL IN, Inhale , Disp: , Rfl:     Albuterol Sulfate (PROAIR RESPICLICK) 022 (90 Base) MCG/ACT AEPB, Inhale 2 puffs every 6 (six) hours as needed (wheezing), Disp: 1 each, Rfl: 1    Cetirizine HCl (ZYRTEC ALLERGY PO), Take by mouth , Disp: , Rfl:     fluticasone (FLONASE) 50 mcg/act nasal spray, 1 spray into each nostril daily  , Disp: , Rfl:     meloxicam (MOBIC) 15 mg tablet, Take 15 mg by mouth daily  , Disp: , Rfl:     methocarbamol (ROBAXIN) 750 mg tablet, Take 750 mg by mouth 3 (three) times a day , Disp: , Rfl:     mometasone-formoterol (DULERA) 100-5 MCG/ACT inhaler, Inhale 2 puffs 2 (two) times a day Rinse mouth after use , Disp: 1 Inhaler, Rfl: 1    oxyCODONE (ROXICODONE) 10 MG TABS, Take by mouth, Disp: , Rfl:     meloxicam (MOBIC) 15 mg tablet, Take 1 tablet (15 mg total) by mouth daily for 17 days, Disp: 17 tablet, Rfl: 0    methocarbamol (ROBAXIN) 500 mg tablet, Take 1 tablet (500 mg total) by mouth 3 (three) times a day for 15 days, Disp: 45 tablet, Rfl: 0    pregabalin (LYRICA) 150 mg capsule, Take 1 capsule by mouth 2 (two) times a day, Disp: , Rfl:     pregabalin (LYRICA) 150 mg capsule, , Disp: , Rfl:      Allergies     Allergies   Allergen Reactions    Shellfish-Derived Products Anaphylaxis       Objective     /80 (BP Location: Left arm, Patient Position: Sitting, Cuff Size: Large)   Pulse 72   Temp 97 6 °F (36 4 °C) (Temporal)   Resp 16   Ht 5' 8" (1 727 m)   Wt 72 kg (158 lb 12 8 oz)   SpO2 98%   BMI 24 15 kg/m² Physical Exam      No exam data present    Health Maintenance     Health Maintenance   Topic Date Due    Pneumococcal Vaccine: Pediatrics (0 to 5 Years) and At-Risk Patients (6 to 59 Years) (1 of 1 - PPSV23) 04/23/1986    DTaP,Tdap,and Td Vaccines (1 - Tdap) 04/23/2001    INFLUENZA VACCINE  07/01/2019    PT PLAN OF CARE  09/20/2019    BMI: Adult  08/07/2020    Depression Screening PHQ  08/21/2020    Pneumococcal Vaccine: 65+ Years (1 of 2 - PCV13) 04/23/2045    HEPATITIS B VACCINES  Aged Out     Immunization History   Administered Date(s) Administered    INFLUENZA 08/28/2015       Laboratory Results:   No results found for: WBC, HGB, HCT, MCV, PLT  No results found for: BUN  No results found for: GLUC, ALT, AST  No results found for: TSH  No results found for: HGBA1C    Lipid Profile:   No results found for: CHOL  No results found for: HDL  No results found for: LDLC  No results found for: LDLCALC  No results found for: TRIG    Assessment/Plan   1  Well adult exam  - CBC and differential; Future  - Comprehensive metabolic panel; Future    2  Vitamin D deficiency  - Vitamin D 25 hydroxy; Future    3  History of high cholesterol  - Lipid panel; Future    1  Healthy male exam   2  Patient Counseling:   · Nutrition: Stressed importance of a well balanced diet, moderation of sodium/saturated fat, caloric balance and sufficient intake of fiber  · Exercise: Stressed the importance of regular exercise with a goal of 150 minutes per week  · Dental Health: Discussed daily flossing and brushing and regular dental visits   · Sexuality: Discussed sexually transmitted infections, use of condoms and prevention of unintended pregnancy  · Alcohol Use:  Recommended moderation of alcohol intake  · Injury Prevention: Discussed Safety Belts, Safety Helmets, and Smoke Detectors    · Immunizations reviewed  yes  · Discussed benefits of screening yes  · Discussed the patient's BMI with him    The BMI is in the acceptable range  3  Cancer Screening N/A  4  Labs ordered  5  Follow up next physical in 1 year      WARNER Godinez

## 2019-08-26 NOTE — PATIENT INSTRUCTIONS

## 2019-08-28 ENCOUNTER — OFFICE VISIT (OUTPATIENT)
Dept: OBGYN CLINIC | Facility: MEDICAL CENTER | Age: 39
End: 2019-08-28
Payer: COMMERCIAL

## 2019-08-28 ENCOUNTER — TELEPHONE (OUTPATIENT)
Dept: OBGYN CLINIC | Facility: MEDICAL CENTER | Age: 39
End: 2019-08-28

## 2019-08-28 VITALS
DIASTOLIC BLOOD PRESSURE: 81 MMHG | HEIGHT: 68 IN | WEIGHT: 156.8 LBS | SYSTOLIC BLOOD PRESSURE: 135 MMHG | HEART RATE: 105 BPM | BODY MASS INDEX: 23.76 KG/M2

## 2019-08-28 DIAGNOSIS — M77.12 LATERAL EPICONDYLITIS OF LEFT ELBOW: ICD-10-CM

## 2019-08-28 DIAGNOSIS — M77.12 LATERAL EPICONDYLITIS OF BOTH ELBOWS: Primary | ICD-10-CM

## 2019-08-28 DIAGNOSIS — M77.11 LATERAL EPICONDYLITIS OF BOTH ELBOWS: Primary | ICD-10-CM

## 2019-08-28 PROCEDURE — 20551 NJX 1 TENDON ORIGIN/INSJ: CPT | Performed by: FAMILY MEDICINE

## 2019-08-28 PROCEDURE — 99214 OFFICE O/P EST MOD 30 MIN: CPT | Performed by: FAMILY MEDICINE

## 2019-08-28 RX ORDER — LIDOCAINE HYDROCHLORIDE 10 MG/ML
1 INJECTION, SOLUTION INFILTRATION; PERINEURAL
Status: COMPLETED | OUTPATIENT
Start: 2019-08-28 | End: 2019-08-28

## 2019-08-28 RX ORDER — TRIAMCINOLONE ACETONIDE 40 MG/ML
40 INJECTION, SUSPENSION INTRA-ARTICULAR; INTRAMUSCULAR
Status: COMPLETED | OUTPATIENT
Start: 2019-08-28 | End: 2019-08-28

## 2019-08-28 RX ADMIN — LIDOCAINE HYDROCHLORIDE 1 ML: 10 INJECTION, SOLUTION INFILTRATION; PERINEURAL at 14:15

## 2019-08-28 RX ADMIN — TRIAMCINOLONE ACETONIDE 40 MG: 40 INJECTION, SUSPENSION INTRA-ARTICULAR; INTRAMUSCULAR at 14:15

## 2019-08-28 NOTE — LETTER
August 28, 2019     Patient: Ananda Aleman   YOB: 1980   Date of Visit: 8/28/2019        To Whom it May Concern:     Crow Moises is under my professional care  He was seen in my office on 8/28/2019  He may return to work on 8/29/19 to full duty with no restrictions          If you have any questions or concerns, please don't hesitate to call        Sincerely,      Rubio Mcbride III, DO       CC: No Recipients

## 2019-08-28 NOTE — PROGRESS NOTES
1  Lateral epicondylitis of both elbows     2  Lateral epicondylitis of left elbow       Orders Placed This Encounter   Procedures    Hand/upper extremity injection: L elbow        Imaging Studies (I personally reviewed images in PACS and report):    Reports reviewed:  x-ray right elbow 08/07/2019: No acute osseous abnormality  X-ray left elbow 08/07/2019: No acute osseous abnormality      IMPRESSION:  bilateral left greater than right lateral epicondylitis  Left lateral epicondylitis injection 8/28/2019        Repeat X-ray next visit:    none      Return in about 2 weeks (around 9/11/2019)  Patient Instructions   Tennis elbow (lateral epicondylitis) or its cousin, Golfers elbow (medial epicondyltitis), are irritations and inflammations of the anchor points of your forearm muscles at your elbow  Tennis elbow is usually caused by overuse, extending your wrist (pushing wrist up) and golfers elbow by flexion of the wrist (pushing wrist down)  This takes at least 6 weeks to heal and usually up to 3 months to see complete resolution  Injections help to reduce pain significantly but the pain will return if you do not perform physical therapy  The curative treatment is physical therapy  Injections, tennis elbow straps, and anti-inflammatories only help to reduce pain  This is a clinical diagnosis and may require xray but MRI is usually not considered until at least 3 months of failed treatment  Surgery is generally reserved for cases which do not improve after 6 months of conservative treatment  (JERED Miller 2017)  reviewed red flags and risks with patient regarding injection including but not limited to infection <0 072% as referenced in some sources, nerve or artery penetration, and if steroids are used-skin dimpling <1%, hypo-pigmentation <1%,  or even damage to the bone as referenced in some sources   I reviewed contraindications including but not limited to active infection, prosthetic joint, fracture, allergy to steroid if used or anesthetics such as lidocaine, and uncontrolled blood thinner medications such as coumadin/warfarin  patient expressed understanding and agreed to proceed with procedure  educated if any symptoms including fevers, chills, swelling, or worsening symptoms occur then to call office or go to hospital for immediate care if physician unavailable  patient expressed understanding and agreed to plan  fice or go to hospital for immediate care if physician unavailable  patient expressed understanding and agreed to plan  CHIEF COMPLAINT:  Follow-up Bilateral elbow pain     HPI:  Lis Avery is a 44 y o  male  who presents for      visit 08/28/2019  Follow-up bilateral epicondylitis:  Uncontrolled  Left greater than right  Denies any injury since last visit  Patient continues to complain of bilateral elbow pain  He has been wearing tennis elbow band without improvement of his symptoms  He went to 1 session of physical therapy but could not perform any activity secondary to pain  Georgette Ormond He is interested in steroid injections today      Review of Systems   Constitutional: Negative for chills, fever and unexpected weight change  HENT: Negative for hearing loss, nosebleeds and sore throat  Eyes: Negative for pain, redness and visual disturbance  Respiratory: Negative for cough, shortness of breath and wheezing  Cardiovascular: Negative for chest pain, palpitations and leg swelling  Gastrointestinal: Negative for abdominal distention, nausea and vomiting  Endocrine: Negative for polydipsia and polyuria  Genitourinary: Negative for dysuria and hematuria  Skin: Negative for rash and wound  Neurological: Negative for dizziness, numbness and headaches  Psychiatric/Behavioral: Negative for decreased concentration and suicidal ideas           Following history reviewed and update:    Past Medical History:   Diagnosis Date    Asthma     Chronic pain     back     Past Surgical History:   Procedure Laterality Date    BACK SURGERY      LUMBAR DISCECTOMY  2015    & fusion; L5-S1     Social History   Social History     Substance and Sexual Activity   Alcohol Use No     Social History     Substance and Sexual Activity   Drug Use Not Currently    Types: Marijuana    Comment: patient had medical marijuana      Social History     Tobacco Use   Smoking Status Current Every Day Smoker    Packs/day: 0 25    Types: Cigarettes   Smokeless Tobacco Never Used     Family History   Problem Relation Age of Onset    Cancer Mother     Cancer Father     Diabetes Father     Diabetes Sister     No Known Problems Maternal Grandmother     No Known Problems Maternal Grandfather     No Known Problems Paternal Grandmother     Diabetes Paternal Grandfather      Allergies   Allergen Reactions    Shellfish-Derived Products Anaphylaxis          Physical Exam  /81   Pulse 105   Ht 5' 8" (1 727 m)   Wt 71 1 kg (156 lb 12 8 oz)   BMI 23 84 kg/m²     Constitutional:  see vital signs  Gen: well-developed, normocephalic/atraumatic, well-groomed  Eyes: No inflammation or discharge of conjunctiva or lids; sclera clear   Pharynx: no inflammation, lesion, or mass of lips  Neck: supple, no masses, non-distended  MSK: no inflammation, lesion, mass, or clubbing of nails and digits except for other than mentioned below  SKIN: no visible rashes or skin lesions  Pulmonary/Chest: Effort normal  No respiratory distress     NEURO: cranial nerves grossly intact  PSYCH:  Alert and oriented to person, place, and time; recent and remote memory intact; mood normal, no depression, anxiety, or agitation, judgment and insight good and intact       Left Wrist  no swelling, erythema, or increased warmth  rom full  + left lateral epicondyle reproduces chief complaint of pain  Strength 5/5 flexion extension with pain reproduced at lateral epicondyle on resisted isometric contraction extension of fingers and wrist   no laxity of joint; druj stable    Left Hand  no erythema  swelling:  None  tenderness:  None  rom fingers mcp, pip, dip intact without pain  No digital scissoring or deviation of fingers  no extensor lag  no rotation of fingers  no joint laxity  strenght flexion and extension mcp, pip, dip 5/5  sensation intact  capillary refill intact   Froment sign:  normal  OK sign:  Normal  Thumb extension:  5/5     Right Elbow:  no swelling, erythema, or increased warmth  rom full  tender over lateral epicondyle   no laxity of joint       Hand/upper extremity injection: L elbow  Date/Time: 8/28/2019 2:15 PM  Consent given by: patient  Site marked: site marked  Timeout: Immediately prior to procedure a time out was called to verify the correct patient, procedure, equipment, support staff and site/side marked as required   Supporting Documentation  Indications: pain and diagnostic   Procedure Details  Condition:lateral epicondylitis Site: L elbow   Needle size: 22 G  Ultrasound guidance: no  Approach: lateral  Medications administered: 1 mL lidocaine 1 %; 40 mg triamcinolone acetonide 40 mg/mL    Patient tolerance: patient tolerated the procedure well with no immediate complications  Dressing:  Sterile dressing applied         Addendum:  Corrected lot# and expiration #  Lidocaine:  Lot #: [id number]  Exp: April 1, 2021     Kenalog:  Lot #: [id number]   Exp: march 31, 2020       ATTESTATION:  I have personally interviewed and examined patient  I have reviewed kiser findings and plan with resident/fellow as outlined in note  I agree with exam and plan as documented in note

## 2019-08-28 NOTE — TELEPHONE ENCOUNTER
Heres the updated numbers for the lidocaine and kenalog that you used for his injections today, 8/28  Please correct them in chart, thanks!     Lidocaine:  Lot #: [id number]  Exp: April 1, 2021    Kenalog:  Lot #: [id number]   Exp: march 31, 2020

## 2019-08-28 NOTE — LETTER
August 28, 2019     Patient: Makenzie Landa   YOB: 1980   Date of Visit: 8/28/2019       To Whom it May Concern:    Edouard Osman is under my professional care  He was seen in my office on 8/28/2019  He may return to work on 8/28/19  I recommend no pulling pushing lifting with left upper extremity over 20 lb for 1 week  I recommend return to full duty no restrictions on 09/04/2019  If you have any questions or concerns, please don't hesitate to call  Sincerely,          Rockwell Hammans III, DO        CC: Jayjay Saravia

## 2019-08-28 NOTE — PATIENT INSTRUCTIONS
Tennis elbow (lateral epicondylitis) or its cousin, Golfers elbow (medial epicondyltitis), are irritations and inflammations of the anchor points of your forearm muscles at your elbow  Tennis elbow is usually caused by overuse, extending your wrist (pushing wrist up) and golfers elbow by flexion of the wrist (pushing wrist down)  This takes at least 6 weeks to heal and usually up to 3 months to see complete resolution  Injections help to reduce pain significantly but the pain will return if you do not perform physical therapy  The curative treatment is physical therapy  Injections, tennis elbow straps, and anti-inflammatories only help to reduce pain  This is a clinical diagnosis and may require xray but MRI is usually not considered until at least 3 months of failed treatment  Surgery is generally reserved for cases which do not improve after 6 months of conservative treatment  (JERED Miller 2017)  reviewed red flags and risks with patient regarding injection including but not limited to infection <0 072% as referenced in some sources, nerve or artery penetration, and if steroids are used-skin dimpling <1%, hypo-pigmentation <1%,  or even damage to the bone as referenced in some sources  I reviewed contraindications including but not limited to active infection, prosthetic joint, fracture, allergy to steroid if used or anesthetics such as lidocaine, and uncontrolled blood thinner medications such as coumadin/warfarin  patient expressed understanding and agreed to proceed with procedure  educated if any symptoms including fevers, chills, swelling, or worsening symptoms occur then to call office or go to hospital for immediate care if physician unavailable  patient expressed understanding and agreed to plan  fice or go to hospital for immediate care if physician unavailable  patient expressed understanding and agreed to plan

## 2019-08-29 ENCOUNTER — OFFICE VISIT (OUTPATIENT)
Dept: PHYSICAL THERAPY | Facility: CLINIC | Age: 39
End: 2019-08-29
Payer: COMMERCIAL

## 2019-08-29 DIAGNOSIS — M77.12 LATERAL EPICONDYLITIS OF LEFT ELBOW: Primary | ICD-10-CM

## 2019-08-29 DIAGNOSIS — M77.11 LATERAL EPICONDYLITIS OF RIGHT ELBOW: ICD-10-CM

## 2019-08-29 PROCEDURE — 97140 MANUAL THERAPY 1/> REGIONS: CPT

## 2019-08-29 PROCEDURE — 97110 THERAPEUTIC EXERCISES: CPT

## 2019-08-29 NOTE — PROGRESS NOTES
Daily Note     Today's date: 2019  Patient name: Norah Renner  : 1980  MRN: [id number]  Referring provider: Manuel Davis  Dx:   Encounter Diagnosis     ICD-10-CM    1  Lateral epicondylitis of left elbow M77 12    2  Lateral epicondylitis of right elbow M77 11        Start Time: 1155  Stop Time: 1305  Total time in clinic (min): 70 minutes    Subjective: Pt reports he is feeling some increased pain and stiffness today, states he had an MD appointment yesterday and received and injection  Pt reports MD cleared him for today's session of PT  Objective: See treatment diary below      Assessment: Tolerated treatment well  Patient demonstrated fatigue post treatment and would benefit from continued PT  Pt performed entire exercise program with mild grimacing, denying pain and continuing to push through exercises  Pt educate on importance of performing exercises in a pain-free range  Pt will benefit from further skilled PT to increase strength, flexibility and function  Continue to progress as able  Plan: Continue per plan of care        Precautions: h/o L5-S1 fusion    Date            FOTO IE             Re-eval IE              Daily Treatment Diary     Manual             MFDc to CET RICHARD HY                                                                   Exercise Diary             MHP pre-tx  10'                        Eccentric wrist ext 3# 2# L, 4# R 2x10           Wrist extension stretch  3x30"           Therabar sup & pron  20x ea           Therabar wrist flex & ext (twist)  10x ea                        Powerweb finger flex & ext  Green 2' ea           Elbow flex AROM 3-way 5# NV 5#2x10 ea                                                                                                                                                              Modalities             CP post-tx  10'

## 2019-09-03 ENCOUNTER — OFFICE VISIT (OUTPATIENT)
Dept: PHYSICAL THERAPY | Facility: CLINIC | Age: 39
End: 2019-09-03
Payer: COMMERCIAL

## 2019-09-03 DIAGNOSIS — M77.12 LATERAL EPICONDYLITIS OF LEFT ELBOW: Primary | ICD-10-CM

## 2019-09-03 DIAGNOSIS — M77.11 LATERAL EPICONDYLITIS OF RIGHT ELBOW: ICD-10-CM

## 2019-09-03 PROCEDURE — 97110 THERAPEUTIC EXERCISES: CPT

## 2019-09-03 PROCEDURE — 97140 MANUAL THERAPY 1/> REGIONS: CPT

## 2019-09-03 NOTE — PROGRESS NOTES
Daily Note     Today's date: 9/3/2019/  Patient name: Jasbir Gupta  : 1980  MRN: [id number]  Referring provider: Selma Haji  Dx:   Encounter Diagnosis     ICD-10-CM    1  Lateral epicondylitis of left elbow M77 12    2  Lateral epicondylitis of right elbow M77 11                   Subjective: Pt reports no change in R elbow but reports some improvement with L elbow  Pt reports mild increase in pain post PT session but states it declined post CP  Objective: See treatment diary below      Assessment: Tolerated treatment well  Pt demonstrates difficulty with eccentric extension weight TE secondary to discomfort  Pt reports Patient demonstrated fatigue post treatment, exhibited good technique with therapeutic exercises and would benefit from continued PT  Plan: Continue per plan of care        Precautions: h/o L5-S1 fusion    Date 8/21 8/29 9/3          FOTO IE             Re-eval IE              Daily Treatment Diary     Manual  8/21 8/29 9/3          MFDc to CET RICHARD HY PK                                                                  Exercise Diary  8/21 8/29 9/3          MHP pre-tx  10' 10'                       Eccentric wrist ext 3# 2# L, 4# R 2x10 2# L, 4# R 2x10          Wrist extension stretch  3x30" 3x30"          Therabar sup & pron  20x ea Red  20x ea          Therabar wrist flex & ext (twist)  10x ea Red  20x ea                       Powerweb finger flex & ext  Green 2' ea Green 2' ea          Elbow flex AROM 3-way 5# NV 5#2x10 ea 5#2x10 ea                                                                                                                                                             Modalities  8/21 8/29 9/3          CP post-tx  10' 10'

## 2019-09-05 ENCOUNTER — OFFICE VISIT (OUTPATIENT)
Dept: PHYSICAL THERAPY | Facility: CLINIC | Age: 39
End: 2019-09-05
Payer: COMMERCIAL

## 2019-09-05 DIAGNOSIS — M77.12 LATERAL EPICONDYLITIS OF LEFT ELBOW: Primary | ICD-10-CM

## 2019-09-05 DIAGNOSIS — M77.11 LATERAL EPICONDYLITIS OF RIGHT ELBOW: ICD-10-CM

## 2019-09-05 PROCEDURE — 97140 MANUAL THERAPY 1/> REGIONS: CPT

## 2019-09-05 PROCEDURE — 97110 THERAPEUTIC EXERCISES: CPT

## 2019-09-05 NOTE — PROGRESS NOTES
Daily Note     Today's date: 2019  Patient name: Shea Lebron  : 1980  MRN: [id number]  Referring provider: Trevor Nayak  Dx:   Encounter Diagnosis     ICD-10-CM    1  Lateral epicondylitis of left elbow M77 12    2  Lateral epicondylitis of right elbow M77 11                   Subjective: Pt reports relief for about an hour but states pain returned  He also reports "cramping" in R hand after work last night  Objective: See treatment diary below      Assessment: Tolerated treatment well  Added cross friction massage to B CET to help decrease pain at distal tendon  Patient demonstrated fatigue post treatment, exhibited good technique with therapeutic exercises and would benefit from continued PT  Plan: Continue per plan of care        Precautions: h/o L5-S1 fusion    Date 8/21 8/29 9/3 9/5         FOTO IE             Re-eval IE              Daily Treatment Diary     Manual  8/21 8/29 9/3 9/5         MFDc to CET RICHARD HY PK PK                      Cross friction massage B    PK                                       Exercise Diary  8/21 8/29 9/3 9/5         MHP pre-tx  10' 10' 10'                      Eccentric wrist ext 3# 2# L, 4# R 2x10 2# L, 4# R 2x10 2# L, 4# R 2x10         Wrist extension stretch  3x30" 3x30" 3x30" + flex         Therabar sup & pron  20x ea Red  20x ea Red  20x ea         Therabar wrist flex & ext (twist)  10x ea Red  20x ea Red  20x ea                      Powerweb finger flex & ext  Green 2' ea Green 2' ea nv         Elbow flex AROM 3-way 5# NV 5#2x10 ea 5#2x10 ea 5#2x10 ea                                                                                                                                                            Modalities  8/21 8/29 9/3 9/5         CP post-tx  10' 10' 10'

## 2019-09-11 ENCOUNTER — OFFICE VISIT (OUTPATIENT)
Dept: OBGYN CLINIC | Facility: MEDICAL CENTER | Age: 39
End: 2019-09-11
Payer: COMMERCIAL

## 2019-09-11 VITALS
WEIGHT: 159 LBS | BODY MASS INDEX: 24.1 KG/M2 | SYSTOLIC BLOOD PRESSURE: 114 MMHG | HEART RATE: 76 BPM | HEIGHT: 68 IN | DIASTOLIC BLOOD PRESSURE: 72 MMHG

## 2019-09-11 DIAGNOSIS — M77.11 LATERAL EPICONDYLITIS OF BOTH ELBOWS: Primary | ICD-10-CM

## 2019-09-11 DIAGNOSIS — M77.12 LATERAL EPICONDYLITIS OF BOTH ELBOWS: Primary | ICD-10-CM

## 2019-09-11 PROCEDURE — 99214 OFFICE O/P EST MOD 30 MIN: CPT | Performed by: FAMILY MEDICINE

## 2019-09-11 NOTE — LETTER
September 11, 2019     Patient: Hernan Gold   YOB: 1980   Date of Visit: 9/11/2019       To Whom it May Concern:    Leta Cary is under my professional care  He was seen in my office on 9/11/2019  He may return to work on 9/11/19  If you have any questions or concerns, please don't hesitate to call  Sincerely,          Dewayne Brothers III, DO        CC: Jayjay Davis

## 2019-09-11 NOTE — PROGRESS NOTES
1  Lateral epicondylitis of both elbows  diclofenac sodium (VOLTAREN) 1 %     No orders of the defined types were placed in this encounter  Imaging Studies (I personally reviewed images in PACS and report):    IMPRESSION:  bilateral left greater than right lateral epicondylitis  Patient declined right-sided corticosteroid injection elbow 09/11/2019  Interventions:  Left lateral epicondylitis injection 8/28/2019         Repeat X-ray next visit:   none      Return if symptoms worsen or fail to improve  Patient Instructions   Tennis elbow (lateral epicondylitis) or its cousin, Golfers elbow (medial epicondyltitis), are irritations and inflammations of the anchor points of your forearm muscles at your elbow  Tennis elbow is usually caused by overuse, extending your wrist (pushing wrist up) and golfers elbow by flexion of the wrist (pushing wrist down)  This takes at least 6 weeks to heal and usually up to 3 months to see complete resolution  Injections help to reduce pain significantly but the pain will return if you do not perform physical therapy  The curative treatment is physical therapy  Injections, tennis elbow straps, and anti-inflammatories only help to reduce pain  This is a clinical diagnosis and may require xray but MRI is usually not considered until at least 3 months of failed treatment  Surgery is generally reserved for cases which do not improve after 6 months of conservative treatment  (JERED Miller 2017)  Recommend against meloxicam in setting of known history of aneurysm  May follow-up with PCP to discuss risks of taking nsaids with known aneurysm  CHIEF COMPLAINT:  F/u bilateral elbow pain     HPI:  Bhavik Arita is a 44 y o  male  who presents for       Visit 9/11/19:  F/u right elbow epicondylitis:  Uncontrolled  Mild-to-moderate improvement since last visit  Brenda Hampton Has been attending physical therapy which offer significant relief    4/5 pain level    F/u left elbow epicondylitis: improved greatly since injection given last visit  Has had no pain since his injection up until yesterday when patient return to weight lifting in notice some residual soreness today after his workout yesterday  Approximately 60% overall improvement  Patient takes meloxicam   He tells me he avoid other NSAIDs due to history of aneurysm  A request topical agent today  Has been attending physical therapy and missed approximately 1 session  Review of Systems   Constitutional: Negative for chills and fever  Neurological: Negative for weakness  Following history reviewed and update:    Past Medical History:   Diagnosis Date    Asthma     Brain aneurysm     Chronic pain     back     Past Surgical History:   Procedure Laterality Date    BACK SURGERY      LUMBAR DISCECTOMY  2015    & fusion;  L5-S1     Social History   Social History     Substance and Sexual Activity   Alcohol Use No     Social History     Substance and Sexual Activity   Drug Use Not Currently    Types: Marijuana    Comment: patient had medical marijuana      Social History     Tobacco Use   Smoking Status Current Every Day Smoker    Packs/day: 0 25    Types: Cigarettes   Smokeless Tobacco Never Used     Family History   Problem Relation Age of Onset    Cancer Mother     Cancer Father     Diabetes Father     Diabetes Sister     No Known Problems Maternal Grandmother     No Known Problems Maternal Grandfather     No Known Problems Paternal Grandmother     Diabetes Paternal Grandfather      Allergies   Allergen Reactions    Shellfish-Derived Products Anaphylaxis          Physical Exam  /72   Pulse 76   Ht 5' 8" (1 727 m)   Wt 72 1 kg (159 lb)   BMI 24 18 kg/m²     Constitutional:  see vital signs  Gen: well-developed, normocephalic/atraumatic, well-groomed  Eyes: No inflammation or discharge of conjunctiva or lids; sclera clear   Pharynx: no inflammation, lesion, or mass of lips  Neck: supple, no masses, non-distended  MSK: no inflammation, lesion, mass, or clubbing of nails and digits except for other than mentioned below  SKIN: no visible rashes or skin lesions  Pulmonary/Chest: Effort normal  No respiratory distress  NEURO: cranial nerves grossly intact  PSYCH:  Alert and oriented to person, place, and time; recent and remote memory intact; mood normal, no depression, anxiety, or agitation, judgment and insight good and intact     Right Elbow Exam     Tenderness   The patient is experiencing tenderness in the lateral epicondyle  Range of Motion   The patient has normal right elbow ROM  Muscle Strength   The patient has normal right elbow strength  Other   Erythema: absent  Scars: absent  Sensation: normal    Comments:  Chief complaint of pain reproduced at lateral epicondyle with resisted isometric contraction of wrist and finger extensors      Left Elbow Exam     Tenderness   The patient is experiencing tenderness in the lateral epicondyle  Range of Motion   The patient has normal left elbow ROM      Muscle Strength   Pronation:  5/5   Supination:  5/5     Other   Erythema: absent  Sensation: normal    Comments:  Chief complaint of pain reproduced at lateral epicondyle with resisted isometric contraction of wrist and finger extensors improved significantly since last visit          Froment:  Normal bilateral  Okay sign:  Normal bilateral  Thumb extension:  Normal bilateral  Sensation bilateral hands:  Intact  Capillary refill bilateral hands intact    Procedures

## 2019-09-11 NOTE — PATIENT INSTRUCTIONS
Patient declined right-sided corticosteroid injection elbow 09/11/2019  Tennis elbow (lateral epicondylitis) or its cousin, Golfers elbow (medial epicondyltitis), are irritations and inflammations of the anchor points of your forearm muscles at your elbow  Tennis elbow is usually caused by overuse, extending your wrist (pushing wrist up) and golfers elbow by flexion of the wrist (pushing wrist down)  This takes at least 6 weeks to heal and usually up to 3 months to see complete resolution  Injections help to reduce pain significantly but the pain will return if you do not perform physical therapy  The curative treatment is physical therapy  Injections, tennis elbow straps, and anti-inflammatories only help to reduce pain  This is a clinical diagnosis and may require xray but MRI is usually not considered until at least 3 months of failed treatment  Surgery is generally reserved for cases which do not improve after 6 months of conservative treatment  (JERED Miller 2017)  Recommend against meloxicam in setting of known history of aneurysm  May follow-up with PCP to discuss risks of taking nsaids with known aneurysm 
Walking around the house , climbing up and down stairs in the house, house chores, ADLs " Since the chemo, I have been fatigued"

## 2019-09-12 ENCOUNTER — OFFICE VISIT (OUTPATIENT)
Dept: PHYSICAL THERAPY | Facility: CLINIC | Age: 39
End: 2019-09-12
Payer: COMMERCIAL

## 2019-09-12 DIAGNOSIS — M77.12 LATERAL EPICONDYLITIS OF LEFT ELBOW: Primary | ICD-10-CM

## 2019-09-12 DIAGNOSIS — M77.11 LATERAL EPICONDYLITIS OF RIGHT ELBOW: ICD-10-CM

## 2019-09-12 PROCEDURE — 97140 MANUAL THERAPY 1/> REGIONS: CPT | Performed by: PHYSICAL MEDICINE & REHABILITATION

## 2019-09-12 PROCEDURE — 97110 THERAPEUTIC EXERCISES: CPT | Performed by: PHYSICAL MEDICINE & REHABILITATION

## 2019-09-12 PROCEDURE — 97112 NEUROMUSCULAR REEDUCATION: CPT | Performed by: PHYSICAL MEDICINE & REHABILITATION

## 2019-09-12 NOTE — PROGRESS NOTES
Daily Note     Today's date: 2019  Patient name: Augusto Medina  : 1980  MRN: [id number]  Referring provider: Loida Alvarenga  Dx:   Encounter Diagnosis     ICD-10-CM    1  Lateral epicondylitis of left elbow M77 12    2  Lateral epicondylitis of right elbow M77 11                   Subjective: Patient presents with c/o continued bilateral discomfort  Objective: See treatment diary below      Assessment: Tolerated treatment well  Patient demonstrated fatigue post treatment, exhibited good technique with therapeutic exercises and would benefit from continued PT  Plan: Continue per plan of care        Precautions: h/o L5-S1 fusion    Date 8/21 8/29 9/3 9/5 9/12        FOTO IE     nv        Re-eval IE              Daily Treatment Diary     Manual  8/21 8/29 9/3 9/5 9/12        MFDc to CET RICHARD HY PK PK TE                     Cross friction massage B    PK LH                                      Exercise Diary  8/21 8/29 9/3 9/5 9/12        MHP pre-tx  10' 10' 10' 10'                     Eccentric wrist ext 3# 2# L, 4# R 2x10 2# L, 4# R 2x10 2# L, 4# R 2x10 4#, 2x10        Wrist extension stretch  3x30" 3x30" 3x30" + flex 3x30" ea        Therabar sup & pron  20x ea Red  20x ea Red  20x ea Red, 20x        Therabar wrist flex & ext (twist)  10x ea Red  20x ea Red  20x ea Red, 20x                     Powerweb finger flex & ext  Green 2' ea Green 2' ea nv Green, 2' ea        Elbow flex AROM 3-way 5# NV 5#2x10 ea 5#2x10 ea 5#2x10 ea 5#, 2x10 ea                                                                                                                                                           Modalities  8/21 8/29 9/3 9/5 9/12        CP post-tx  10' 10' 10' 10'

## 2019-09-16 ENCOUNTER — OFFICE VISIT (OUTPATIENT)
Dept: PHYSICAL THERAPY | Facility: CLINIC | Age: 39
End: 2019-09-16
Payer: COMMERCIAL

## 2019-09-16 DIAGNOSIS — M77.11 LATERAL EPICONDYLITIS OF RIGHT ELBOW: ICD-10-CM

## 2019-09-16 DIAGNOSIS — M77.12 LATERAL EPICONDYLITIS OF LEFT ELBOW: Primary | ICD-10-CM

## 2019-09-16 PROCEDURE — 97140 MANUAL THERAPY 1/> REGIONS: CPT

## 2019-09-16 PROCEDURE — 97110 THERAPEUTIC EXERCISES: CPT

## 2019-09-16 PROCEDURE — 97112 NEUROMUSCULAR REEDUCATION: CPT

## 2019-09-16 NOTE — PROGRESS NOTES
Daily Note     Today's date: 2019  Patient name: Kvng Richmond  : 1980  MRN: [id number]  Referring provider: Dominic Chaves  Dx:   Encounter Diagnosis     ICD-10-CM    1  Lateral epicondylitis of left elbow M77 12    2  Lateral epicondylitis of right elbow M77 11                   Subjective: Pt presents to PT reporting improvement stating with B elbows "I feel better" grading pain on R a 3/10 and L 5/10  Pt reports mild increase in soreness post PT session  Objective: See treatment diary below      Assessment: Tolerated treatment well however requires cuing to remain on task secondary using his phone  Patient demonstrated fatigue post treatment, exhibited good technique with therapeutic exercises and would benefit from continued PT  Plan: Continue per plan of care        Precautions: h/o L5-S1 fusion    Date 8/21 8/29 9/3 9/5 9/12 9/16       FOTO IE     nv        Re-eval IE              Daily Treatment Diary     Manual  8/21 8/29 9/3 9/5 9/12 9/16       MFDc to CET RICHARD HY PK PK TE                     Cross friction massage B    PK LH                                      Exercise Diary  8/21 8/29 9/3 9/5 9/12 9/16       MHP pre-tx  10' 10' 10' 10'                     Eccentric wrist ext 3# 2# L, 4# R 2x10 2# L, 4# R 2x10 2# L, 4# R 2x10 4#, 2x10 4#, 2x10       Wrist extension stretch  3x30" 3x30" 3x30" + flex 3x30" ea 3x30" ea       Therabar sup & pron  20x ea Red  20x ea Red  20x ea Red, 20x Red, 20x       Therabar wrist flex & ext (twist)  10x ea Red  20x ea Red  20x ea Red, 20x Red, 20x                    Powerweb finger flex & ext  Green 2' ea Green 2' ea nv Green, 2' ea Green, 2' ea       Elbow flex AROM 3-way 5# NV 5#2x10 ea 5#2x10 ea 5#2x10 ea 5#, 2x10 ea 5#, 2x10 ea                                                                                                                                                          Modalities  8/21 8/29 9/3 9/5 9/12        CP post-tx  10' 10' 10' 10'

## 2019-09-19 ENCOUNTER — APPOINTMENT (OUTPATIENT)
Dept: PHYSICAL THERAPY | Facility: CLINIC | Age: 39
End: 2019-09-19
Payer: COMMERCIAL

## 2019-09-23 ENCOUNTER — OFFICE VISIT (OUTPATIENT)
Dept: OBGYN CLINIC | Facility: MEDICAL CENTER | Age: 39
End: 2019-09-23
Payer: COMMERCIAL

## 2019-09-23 VITALS
WEIGHT: 160.5 LBS | SYSTOLIC BLOOD PRESSURE: 116 MMHG | DIASTOLIC BLOOD PRESSURE: 75 MMHG | HEIGHT: 68 IN | HEART RATE: 76 BPM | BODY MASS INDEX: 24.32 KG/M2

## 2019-09-23 DIAGNOSIS — M77.12 LATERAL EPICONDYLITIS OF BOTH ELBOWS: Primary | ICD-10-CM

## 2019-09-23 DIAGNOSIS — M77.11 LATERAL EPICONDYLITIS OF BOTH ELBOWS: Primary | ICD-10-CM

## 2019-09-23 PROCEDURE — 99213 OFFICE O/P EST LOW 20 MIN: CPT | Performed by: FAMILY MEDICINE

## 2019-09-23 NOTE — PROGRESS NOTES
1  Lateral epicondylitis of both elbows       No orders of the defined types were placed in this encounter  Imaging Studies (I personally reviewed images in PACS and report):    IMPRESSION:  Bilateral lateral epicondylitis  Left elbow status post corticosteroid injection a 08/28/2019-improving  Right elbow declined corticosteroid injection-improving with therapy    Repeat X-ray next visit:   None      Return if symptoms worsen or fail to improve  Patient Instructions   Consider PRP injection to jumpstart healing which has an out of pocket cost and not covered by insurance  Earliest left elbow repeat  Corticosteroid injection after thanksgiving 2019    Tennis elbow (lateral epicondylitis) or its cousin, Golfers elbow (medial epicondyltitis), are irritations and inflammations of the anchor points of your forearm muscles at your elbow  Tennis elbow is usually caused by overuse, extending your wrist (pushing wrist up) and golfers elbow by flexion of the wrist (pushing wrist down)  This takes at least 6 weeks to heal and usually up to 3 months to see complete resolution  Injections help to reduce pain significantly but the pain will return if you do not perform physical therapy  The curative treatment is physical therapy  Injections, tennis elbow straps, and anti-inflammatories only help to reduce pain  This is a clinical diagnosis and may require xray but MRI is usually not considered until at least 3 months of failed treatment  Surgery is generally reserved for cases which do not improve after 6 months of conservative treatment  (JERED Miller 2017)  CHIEF COMPLAINT:  Follow-up bilateral lateral epicondylitis    HPI:  Jet Greer is a 44 y o  male  who presents for       Visit 09/23/2019:  Here for repeat evaluation and consideration of corticosteroid injection right elbow  Last visit patient instructed return as needed  Follow-up left lateral epicondylitis:  Improving    Pain significantly improved since his corticosteroid injection  Follow-up right lateral epicondylitis:  Improving  Patient declined corticosteroid injection at previous visits  For bilateral epicondylitis patient is performing home exercise program   He has attended formal physical therapy and feels comfortable proceeding with continuing therapy at home  Review of Systems   Constitutional: Negative for chills and fever  Neurological: Negative for weakness  Following history reviewed and update:    Past Medical History:   Diagnosis Date    Asthma     Brain aneurysm     Chronic pain     back     Past Surgical History:   Procedure Laterality Date    BACK SURGERY      LUMBAR DISCECTOMY  2015    & fusion;  L5-S1     Social History   Social History     Substance and Sexual Activity   Alcohol Use No     Social History     Substance and Sexual Activity   Drug Use Not Currently    Types: Marijuana    Comment: patient had medical marijuana      Social History     Tobacco Use   Smoking Status Current Every Day Smoker    Packs/day: 0 25    Types: Cigarettes   Smokeless Tobacco Never Used     Family History   Problem Relation Age of Onset    Cancer Mother     Cancer Father     Diabetes Father     Diabetes Sister     No Known Problems Maternal Grandmother     No Known Problems Maternal Grandfather     No Known Problems Paternal Grandmother     Diabetes Paternal Grandfather      Allergies   Allergen Reactions    Shellfish-Derived Products Anaphylaxis          Physical Exam  /75   Pulse 76   Ht 5' 8" (1 727 m)   Wt 72 8 kg (160 lb 8 oz)   BMI 24 40 kg/m²     Constitutional:  see vital signs  Gen: well-developed, normocephalic/atraumatic, well-groomed  Eyes: No inflammation or discharge of conjunctiva or lids; sclera clear   Pharynx: no inflammation, lesion, or mass of lips  Neck: supple, no masses, non-distended  MSK: no inflammation, lesion, mass, or clubbing of nails and digits except for other than mentioned below  SKIN: no visible rashes or skin lesions  Pulmonary/Chest: Effort normal  No respiratory distress  NEURO: cranial nerves grossly intact  PSYCH:  Alert and oriented to person, place, and time; recent and remote memory intact; mood normal, no depression, anxiety, or agitation, judgment and insight good and intact     Right Elbow Exam     Tenderness   The patient is experiencing tenderness in the lateral epicondyle  Range of Motion   The patient has normal right elbow ROM  Muscle Strength   The patient has normal right elbow strength  Other   Erythema: absent  Scars: absent  Sensation: normal    Comments:  Chief complaint of pain reproduced at lateral epicondyle with resisted isometric contraction of wrist and finger extensors but significant improved compared to past visits      Left Elbow Exam     Tenderness   The patient is experiencing tenderness in the lateral epicondyle  Range of Motion   The patient has normal left elbow ROM      Muscle Strength   Pronation:  5/5   Supination:  5/5     Other   Erythema: absent  Sensation: normal    Comments:  Chief complaint of pain reproduced at lateral epicondyle with resisted isometric contraction of wrist and finger extensors but significant improved compared to past visits            Right Wrist  no swelling, erythema, or increased warmth  rom full  nontender  no laxity of joint; druj stable  Carpal tunnel compression test:  Phalen's test:  Tinel's carpal tunnel test:    Right Hand  no erythema  swelling:  None  tenderness:  None  rom fingers mcp, pip, dip intact without pain  No digital scissoring or deviation of fingers  no extensor lag  no rotation of fingers  no joint laxity  strenght flexion and extension mcp, pip, dip 5/5  sensation intact  capillary refill intact   Froment sign:  normal  OK sign:  Normal  Thumb extension:  5/5       Left Wrist  no swelling, erythema, or increased warmth  rom full  nontender  no laxity of joint; druj stable  Carpal tunnel compression test:  Phalen's test:  Tinel's carpal tunnel test:    Left Hand  no erythema  swelling:  None  tenderness:  None  rom fingers mcp, pip, dip intact without pain  No digital scissoring or deviation of fingers  no extensor lag  no rotation of fingers  no joint laxity  strenght flexion and extension mcp, pip, dip 5/5  sensation intact  capillary refill intact   Froment sign:  normal  OK sign:  Normal  Thumb extension:  5/5     Procedures

## 2019-09-23 NOTE — PATIENT INSTRUCTIONS
Consider PRP injection to jumpstart healing which has an out of pocket cost and not covered by insurance  Earliest left elbow repeat  Corticosteroid injection after thanksgiving 2019    Tennis elbow (lateral epicondylitis) or its cousin, Golfers elbow (medial epicondyltitis), are irritations and inflammations of the anchor points of your forearm muscles at your elbow  Tennis elbow is usually caused by overuse, extending your wrist (pushing wrist up) and golfers elbow by flexion of the wrist (pushing wrist down)  This takes at least 6 weeks to heal and usually up to 3 months to see complete resolution  Injections help to reduce pain significantly but the pain will return if you do not perform physical therapy  The curative treatment is physical therapy  Injections, tennis elbow straps, and anti-inflammatories only help to reduce pain  This is a clinical diagnosis and may require xray but MRI is usually not considered until at least 3 months of failed treatment  Surgery is generally reserved for cases which do not improve after 6 months of conservative treatment  (JERED Miller 2017)

## 2019-09-23 NOTE — LETTER
September 23, 2019     Patient: Betina Conley   YOB: 1980   Date of Visit: 9/23/2019       To Whom it May Concern:    Niru Angel is under my professional care  He was seen in my office on 9/23/2019  If you have any questions or concerns, please don't hesitate to call  Sincerely,          Chelsea Saucedo III,         CC: Jayjay Sarabia

## 2019-09-24 ENCOUNTER — APPOINTMENT (OUTPATIENT)
Dept: PHYSICAL THERAPY | Facility: CLINIC | Age: 39
End: 2019-09-24
Payer: COMMERCIAL

## 2019-09-26 ENCOUNTER — APPOINTMENT (OUTPATIENT)
Dept: PHYSICAL THERAPY | Facility: CLINIC | Age: 39
End: 2019-09-26
Payer: COMMERCIAL

## 2019-09-30 ENCOUNTER — TRANSCRIBE ORDERS (OUTPATIENT)
Dept: ADMINISTRATIVE | Facility: HOSPITAL | Age: 39
End: 2019-09-30

## 2019-09-30 ENCOUNTER — HOSPITAL ENCOUNTER (OUTPATIENT)
Dept: RADIOLOGY | Facility: HOSPITAL | Age: 39
Discharge: HOME | End: 2019-09-30
Payer: COMMERCIAL

## 2019-09-30 ENCOUNTER — APPOINTMENT (OUTPATIENT)
Dept: LAB | Facility: HOSPITAL | Age: 39
End: 2019-09-30
Payer: COMMERCIAL

## 2019-09-30 DIAGNOSIS — Z86.39 HISTORY OF HIGH CHOLESTEROL: ICD-10-CM

## 2019-09-30 DIAGNOSIS — M47.812 OSTEOARTHRITIS OF CERVICAL SPINE, UNSPECIFIED SPINAL OSTEOARTHRITIS COMPLICATION STATUS: ICD-10-CM

## 2019-09-30 DIAGNOSIS — E55.9 VITAMIN D DEFICIENCY: ICD-10-CM

## 2019-09-30 DIAGNOSIS — M47.812 OSTEOARTHRITIS OF CERVICAL SPINE, UNSPECIFIED SPINAL OSTEOARTHRITIS COMPLICATION STATUS: Primary | ICD-10-CM

## 2019-09-30 DIAGNOSIS — Z00.00 WELL ADULT EXAM: ICD-10-CM

## 2019-09-30 DIAGNOSIS — M54.50 LOW BACK PAIN WITHOUT SCIATICA, UNSPECIFIED BACK PAIN LATERALITY, UNSPECIFIED CHRONICITY: Primary | ICD-10-CM

## 2019-09-30 LAB
25(OH)D3 SERPL-MCNC: 26 NG/ML (ref 30–100)
ALBUMIN SERPL BCP-MCNC: 3.9 G/DL (ref 3.5–5)
ALP SERPL-CCNC: 74 U/L (ref 46–116)
ALT SERPL W P-5'-P-CCNC: 34 U/L (ref 12–78)
ANION GAP SERPL CALCULATED.3IONS-SCNC: 10 MMOL/L (ref 4–13)
AST SERPL W P-5'-P-CCNC: 25 U/L (ref 5–45)
BASOPHILS # BLD AUTO: 0.05 THOUSANDS/ΜL (ref 0–0.1)
BASOPHILS NFR BLD AUTO: 1 % (ref 0–1)
BILIRUB SERPL-MCNC: 0.39 MG/DL (ref 0.2–1)
BUN SERPL-MCNC: 17 MG/DL (ref 5–25)
CALCIUM SERPL-MCNC: 8.9 MG/DL (ref 8.3–10.1)
CHLORIDE SERPL-SCNC: 106 MMOL/L (ref 100–108)
CHOLEST SERPL-MCNC: 177 MG/DL (ref 50–200)
CO2 SERPL-SCNC: 29 MMOL/L (ref 21–32)
CREAT SERPL-MCNC: 1.01 MG/DL (ref 0.6–1.3)
EOSINOPHIL # BLD AUTO: 0.14 THOUSAND/ΜL (ref 0–0.61)
EOSINOPHIL NFR BLD AUTO: 2 % (ref 0–6)
ERYTHROCYTE [DISTWIDTH] IN BLOOD BY AUTOMATED COUNT: 12.9 % (ref 11.6–15.1)
GFR SERPL CREATININE-BSD FRML MDRD: 93 ML/MIN/1.73SQ M
GLUCOSE P FAST SERPL-MCNC: 93 MG/DL (ref 65–99)
HCT VFR BLD AUTO: 44.1 % (ref 36.5–49.3)
HDLC SERPL-MCNC: 45 MG/DL (ref 40–60)
HGB BLD-MCNC: 14.6 G/DL (ref 12–17)
IMM GRANULOCYTES # BLD AUTO: 0.02 THOUSAND/UL (ref 0–0.2)
IMM GRANULOCYTES NFR BLD AUTO: 0 % (ref 0–2)
LDLC SERPL CALC-MCNC: 99 MG/DL (ref 0–100)
LYMPHOCYTES # BLD AUTO: 1.94 THOUSANDS/ΜL (ref 0.6–4.47)
LYMPHOCYTES NFR BLD AUTO: 33 % (ref 14–44)
MCH RBC QN AUTO: 32.7 PG (ref 26.8–34.3)
MCHC RBC AUTO-ENTMCNC: 33.1 G/DL (ref 31.4–37.4)
MCV RBC AUTO: 99 FL (ref 82–98)
MONOCYTES # BLD AUTO: 0.59 THOUSAND/ΜL (ref 0.17–1.22)
MONOCYTES NFR BLD AUTO: 10 % (ref 4–12)
NEUTROPHILS # BLD AUTO: 3.1 THOUSANDS/ΜL (ref 1.85–7.62)
NEUTS SEG NFR BLD AUTO: 54 % (ref 43–75)
NONHDLC SERPL-MCNC: 132 MG/DL
NRBC BLD AUTO-RTO: 0 /100 WBCS
PLATELET # BLD AUTO: 289 THOUSANDS/UL (ref 149–390)
PMV BLD AUTO: 9.6 FL (ref 8.9–12.7)
POTASSIUM SERPL-SCNC: 3.6 MMOL/L (ref 3.5–5.3)
PROT SERPL-MCNC: 7.1 G/DL (ref 6.4–8.2)
RBC # BLD AUTO: 4.46 MILLION/UL (ref 3.88–5.62)
SODIUM SERPL-SCNC: 145 MMOL/L (ref 136–145)
TRIGL SERPL-MCNC: 167 MG/DL
WBC # BLD AUTO: 5.84 THOUSAND/UL (ref 4.31–10.16)

## 2019-09-30 PROCEDURE — 72050 X-RAY EXAM NECK SPINE 4/5VWS: CPT

## 2019-09-30 PROCEDURE — 85025 COMPLETE CBC W/AUTO DIFF WBC: CPT

## 2019-09-30 PROCEDURE — 36415 COLL VENOUS BLD VENIPUNCTURE: CPT

## 2019-09-30 PROCEDURE — 80053 COMPREHEN METABOLIC PANEL: CPT

## 2019-09-30 PROCEDURE — 82306 VITAMIN D 25 HYDROXY: CPT

## 2019-09-30 PROCEDURE — 80061 LIPID PANEL: CPT

## 2019-10-04 ENCOUNTER — HOSPITAL ENCOUNTER (OUTPATIENT)
Dept: MRI IMAGING | Facility: HOSPITAL | Age: 39
Discharge: HOME | End: 2019-10-04
Payer: COMMERCIAL

## 2019-10-04 DIAGNOSIS — M54.50 LOW BACK PAIN WITHOUT SCIATICA, UNSPECIFIED BACK PAIN LATERALITY, UNSPECIFIED CHRONICITY: ICD-10-CM

## 2019-10-04 PROCEDURE — A9585 GADOBUTROL INJECTION: HCPCS | Performed by: RADIOLOGY

## 2019-10-04 PROCEDURE — 72158 MRI LUMBAR SPINE W/O & W/DYE: CPT

## 2019-10-04 RX ADMIN — GADOBUTROL 8 ML: 604.72 INJECTION INTRAVENOUS at 13:56

## 2020-04-25 ENCOUNTER — TELEPHONE (OUTPATIENT)
Dept: OTHER | Facility: OTHER | Age: 40
End: 2020-04-25

## 2020-04-25 ENCOUNTER — TELEMEDICINE (OUTPATIENT)
Dept: FAMILY MEDICINE CLINIC | Facility: CLINIC | Age: 40
End: 2020-04-25
Payer: COMMERCIAL

## 2020-04-25 ENCOUNTER — APPOINTMENT (OUTPATIENT)
Dept: URGENT CARE | Age: 40
End: 2020-04-25
Payer: COMMERCIAL

## 2020-04-25 ENCOUNTER — OFFICE VISIT (OUTPATIENT)
Dept: URGENT CARE | Age: 40
End: 2020-04-25
Payer: COMMERCIAL

## 2020-04-25 DIAGNOSIS — Z20.828 EXPOSURE TO SARS-ASSOCIATED CORONAVIRUS: ICD-10-CM

## 2020-04-25 DIAGNOSIS — Z20.822 CLOSE EXPOSURE TO COVID-19 VIRUS: Primary | ICD-10-CM

## 2020-04-25 PROCEDURE — 99213 OFFICE O/P EST LOW 20 MIN: CPT | Performed by: FAMILY MEDICINE

## 2020-04-25 PROCEDURE — U0003 INFECTIOUS AGENT DETECTION BY NUCLEIC ACID (DNA OR RNA); SEVERE ACUTE RESPIRATORY SYNDROME CORONAVIRUS 2 (SARS-COV-2) (CORONAVIRUS DISEASE [COVID-19]), AMPLIFIED PROBE TECHNIQUE, MAKING USE OF HIGH THROUGHPUT TECHNOLOGIES AS DESCRIBED BY CMS-2020-01-R: HCPCS | Performed by: PHYSICIAN ASSISTANT

## 2020-04-26 ENCOUNTER — TELEMEDICINE (OUTPATIENT)
Dept: FAMILY MEDICINE CLINIC | Facility: CLINIC | Age: 40
End: 2020-04-26
Payer: COMMERCIAL

## 2020-04-26 DIAGNOSIS — J45.20 MILD INTERMITTENT ASTHMA WITHOUT COMPLICATION: ICD-10-CM

## 2020-04-26 DIAGNOSIS — R05.9 COUGH: ICD-10-CM

## 2020-04-26 DIAGNOSIS — J44.9 CHRONIC OBSTRUCTIVE PULMONARY DISEASE, UNSPECIFIED COPD TYPE (HCC): ICD-10-CM

## 2020-04-26 DIAGNOSIS — Z20.822 CLOSE EXPOSURE TO COVID-19 VIRUS: Primary | ICD-10-CM

## 2020-04-26 PROCEDURE — 99214 OFFICE O/P EST MOD 30 MIN: CPT | Performed by: FAMILY MEDICINE

## 2020-04-26 RX ORDER — ALBUTEROL SULFATE 90 UG/1
2 AEROSOL, METERED RESPIRATORY (INHALATION) EVERY 4 HOURS PRN
Qty: 8.5 G | Refills: 2 | Status: SHIPPED | OUTPATIENT
Start: 2020-04-26 | End: 2021-12-08 | Stop reason: ALTCHOICE

## 2020-04-27 ENCOUNTER — TELEMEDICINE (OUTPATIENT)
Dept: FAMILY MEDICINE CLINIC | Facility: CLINIC | Age: 40
End: 2020-04-27
Payer: COMMERCIAL

## 2020-04-27 ENCOUNTER — DOCUMENTATION (OUTPATIENT)
Dept: FAMILY MEDICINE CLINIC | Facility: CLINIC | Age: 40
End: 2020-04-27

## 2020-04-27 DIAGNOSIS — U07.1 COVID-19 VIRUS INFECTION: Primary | ICD-10-CM

## 2020-04-27 LAB — SARS-COV-2 RNA SPEC QL NAA+PROBE: DETECTED

## 2020-04-27 PROCEDURE — 99212 OFFICE O/P EST SF 10 MIN: CPT | Performed by: FAMILY MEDICINE

## 2020-04-28 ENCOUNTER — TELEMEDICINE (OUTPATIENT)
Dept: FAMILY MEDICINE CLINIC | Facility: CLINIC | Age: 40
End: 2020-04-28
Payer: COMMERCIAL

## 2020-04-28 DIAGNOSIS — U07.1 COVID-19 VIRUS INFECTION: Primary | ICD-10-CM

## 2020-04-28 PROCEDURE — 99213 OFFICE O/P EST LOW 20 MIN: CPT | Performed by: FAMILY MEDICINE

## 2020-05-01 ENCOUNTER — TELEMEDICINE (OUTPATIENT)
Dept: FAMILY MEDICINE CLINIC | Facility: CLINIC | Age: 40
End: 2020-05-01
Payer: COMMERCIAL

## 2020-05-01 DIAGNOSIS — U07.1 COVID-19 VIRUS INFECTION: Primary | ICD-10-CM

## 2020-05-01 DIAGNOSIS — R05.9 COUGH: ICD-10-CM

## 2020-05-01 PROCEDURE — 99213 OFFICE O/P EST LOW 20 MIN: CPT | Performed by: FAMILY MEDICINE

## 2020-05-01 RX ORDER — AZITHROMYCIN 250 MG/1
TABLET, FILM COATED ORAL
Qty: 6 TABLET | Refills: 0 | Status: SHIPPED | OUTPATIENT
Start: 2020-05-01 | End: 2020-05-05

## 2020-05-03 ENCOUNTER — TELEMEDICINE (OUTPATIENT)
Dept: FAMILY MEDICINE CLINIC | Facility: CLINIC | Age: 40
End: 2020-05-03
Payer: COMMERCIAL

## 2020-05-03 DIAGNOSIS — R05.9 COUGH: ICD-10-CM

## 2020-05-03 DIAGNOSIS — U07.1 COVID-19 VIRUS INFECTION: Primary | ICD-10-CM

## 2020-05-03 DIAGNOSIS — G93.3 POSTVIRAL FATIGUE SYNDROME: ICD-10-CM

## 2020-05-03 PROCEDURE — 99213 OFFICE O/P EST LOW 20 MIN: CPT | Performed by: FAMILY MEDICINE

## 2020-05-06 ENCOUNTER — TELEMEDICINE (OUTPATIENT)
Dept: FAMILY MEDICINE CLINIC | Facility: CLINIC | Age: 40
End: 2020-05-06
Payer: COMMERCIAL

## 2020-05-06 DIAGNOSIS — R05.9 COUGH: ICD-10-CM

## 2020-05-06 DIAGNOSIS — U07.1 COVID-19 VIRUS INFECTION: Primary | ICD-10-CM

## 2020-05-06 PROCEDURE — 99213 OFFICE O/P EST LOW 20 MIN: CPT | Performed by: FAMILY MEDICINE

## 2020-05-06 RX ORDER — BENZONATATE 100 MG/1
100 CAPSULE ORAL 3 TIMES DAILY PRN
Qty: 30 CAPSULE | Refills: 0 | Status: SHIPPED | OUTPATIENT
Start: 2020-05-06 | End: 2020-05-15 | Stop reason: ALTCHOICE

## 2020-05-08 ENCOUNTER — TELEMEDICINE (OUTPATIENT)
Dept: FAMILY MEDICINE CLINIC | Facility: CLINIC | Age: 40
End: 2020-05-08

## 2020-05-08 ENCOUNTER — APPOINTMENT (OUTPATIENT)
Dept: RADIOLOGY | Facility: MEDICAL CENTER | Age: 40
End: 2020-05-08
Payer: COMMERCIAL

## 2020-05-08 DIAGNOSIS — U07.1 COVID-19 VIRUS INFECTION: Primary | ICD-10-CM

## 2020-05-08 DIAGNOSIS — J45.20 MILD INTERMITTENT ASTHMA WITHOUT COMPLICATION: ICD-10-CM

## 2020-05-08 DIAGNOSIS — U07.1 COVID-19 VIRUS INFECTION: ICD-10-CM

## 2020-05-08 PROCEDURE — NC001 PR NO CHARGE: Performed by: FAMILY MEDICINE

## 2020-05-08 PROCEDURE — 71046 X-RAY EXAM CHEST 2 VIEWS: CPT

## 2020-05-08 RX ORDER — DULOXETIN HYDROCHLORIDE 30 MG/1
30 CAPSULE, DELAYED RELEASE ORAL DAILY
COMMUNITY
Start: 2020-04-30 | End: 2021-12-08 | Stop reason: ALTCHOICE

## 2020-05-08 RX ORDER — TIZANIDINE 4 MG/1
4 TABLET ORAL 2 TIMES DAILY PRN
COMMUNITY
Start: 2020-04-22 | End: 2021-12-08 | Stop reason: ALTCHOICE

## 2020-05-13 ENCOUNTER — TELEMEDICINE (OUTPATIENT)
Dept: FAMILY MEDICINE CLINIC | Facility: CLINIC | Age: 40
End: 2020-05-13
Payer: COMMERCIAL

## 2020-05-13 DIAGNOSIS — U07.1 COVID-19 VIRUS INFECTION: Primary | ICD-10-CM

## 2020-05-13 DIAGNOSIS — J45.41 MODERATE PERSISTENT ASTHMA WITH ACUTE EXACERBATION: ICD-10-CM

## 2020-05-13 PROCEDURE — 99213 OFFICE O/P EST LOW 20 MIN: CPT | Performed by: FAMILY MEDICINE

## 2020-05-15 ENCOUNTER — TELEMEDICINE (OUTPATIENT)
Dept: FAMILY MEDICINE CLINIC | Facility: CLINIC | Age: 40
End: 2020-05-15
Payer: COMMERCIAL

## 2020-05-15 DIAGNOSIS — U07.1 COVID-19 VIRUS INFECTION: Primary | ICD-10-CM

## 2020-05-15 PROCEDURE — 99213 OFFICE O/P EST LOW 20 MIN: CPT | Performed by: FAMILY MEDICINE

## 2020-05-18 ENCOUNTER — OFFICE VISIT (OUTPATIENT)
Dept: FAMILY MEDICINE CLINIC | Facility: CLINIC | Age: 40
End: 2020-05-18
Payer: COMMERCIAL

## 2020-05-18 ENCOUNTER — TELEPHONE (OUTPATIENT)
Dept: FAMILY MEDICINE CLINIC | Facility: CLINIC | Age: 40
End: 2020-05-18

## 2020-05-18 VITALS
OXYGEN SATURATION: 95 % | HEART RATE: 84 BPM | WEIGHT: 159.3 LBS | DIASTOLIC BLOOD PRESSURE: 84 MMHG | HEIGHT: 68 IN | SYSTOLIC BLOOD PRESSURE: 128 MMHG | TEMPERATURE: 97.6 F | RESPIRATION RATE: 18 BRPM | BODY MASS INDEX: 24.14 KG/M2

## 2020-05-18 DIAGNOSIS — U07.1 COVID-19 VIRUS INFECTION: Primary | ICD-10-CM

## 2020-05-18 DIAGNOSIS — J45.31 MILD PERSISTENT ASTHMA WITH ACUTE EXACERBATION: ICD-10-CM

## 2020-05-18 PROBLEM — M79.602 LEFT ARM PAIN: Status: ACTIVE | Noted: 2020-05-18

## 2020-05-18 PROBLEM — I67.1 BRAIN ANEURYSM: Status: ACTIVE | Noted: 2017-05-08

## 2020-05-18 PROCEDURE — 3008F BODY MASS INDEX DOCD: CPT | Performed by: FAMILY MEDICINE

## 2020-05-18 PROCEDURE — 99214 OFFICE O/P EST MOD 30 MIN: CPT | Performed by: FAMILY MEDICINE

## 2020-05-18 RX ORDER — MONTELUKAST SODIUM 10 MG/1
10 TABLET ORAL
Qty: 90 TABLET | Refills: 3 | Status: SHIPPED | OUTPATIENT
Start: 2020-05-18 | End: 2020-05-18 | Stop reason: SDUPTHER

## 2020-05-18 RX ORDER — MONTELUKAST SODIUM 10 MG/1
10 TABLET ORAL
Qty: 90 TABLET | Refills: 3 | Status: SHIPPED | OUTPATIENT
Start: 2020-05-18 | End: 2021-12-08 | Stop reason: ALTCHOICE

## 2020-07-15 ENCOUNTER — HOSPITAL ENCOUNTER (EMERGENCY)
Facility: HOSPITAL | Age: 40
Discharge: HOME | End: 2020-07-16
Attending: EMERGENCY MEDICINE | Admitting: EMERGENCY MEDICINE
Payer: OTHER MISCELLANEOUS

## 2020-07-15 ENCOUNTER — APPOINTMENT (OUTPATIENT)
Dept: URGENT CARE | Facility: MEDICAL CENTER | Age: 40
End: 2020-07-15
Payer: OTHER MISCELLANEOUS

## 2020-07-15 DIAGNOSIS — R10.31 RIGHT GROIN PAIN: Primary | ICD-10-CM

## 2020-07-15 PROCEDURE — G0382 LEV 3 HOSP TYPE B ED VISIT: HCPCS | Performed by: PHYSICIAN ASSISTANT

## 2020-07-15 PROCEDURE — 99283 EMERGENCY DEPT VISIT LOW MDM: CPT | Performed by: PHYSICIAN ASSISTANT

## 2020-07-15 PROCEDURE — 99283 EMERGENCY DEPT VISIT LOW MDM: CPT

## 2020-07-16 VITALS
OXYGEN SATURATION: 100 % | TEMPERATURE: 98 F | RESPIRATION RATE: 16 BRPM | HEART RATE: 70 BPM | DIASTOLIC BLOOD PRESSURE: 78 MMHG | SYSTOLIC BLOOD PRESSURE: 142 MMHG | WEIGHT: 137.35 LBS | BODY MASS INDEX: 20.88 KG/M2

## 2020-07-16 PROCEDURE — 99284 EMERGENCY DEPT VISIT MOD MDM: CPT | Performed by: EMERGENCY MEDICINE

## 2020-07-16 PROCEDURE — 96372 THER/PROPH/DIAG INJ SC/IM: CPT

## 2020-07-16 RX ORDER — OXYCODONE HYDROCHLORIDE 10 MG/1
10 TABLET ORAL ONCE
Status: COMPLETED | OUTPATIENT
Start: 2020-07-16 | End: 2020-07-16

## 2020-07-16 RX ORDER — KETOROLAC TROMETHAMINE 30 MG/ML
15 INJECTION, SOLUTION INTRAMUSCULAR; INTRAVENOUS ONCE
Status: COMPLETED | OUTPATIENT
Start: 2020-07-16 | End: 2020-07-16

## 2020-07-16 RX ADMIN — OXYCODONE HYDROCHLORIDE 10 MG: 10 TABLET ORAL at 00:27

## 2020-07-16 RX ADMIN — KETOROLAC TROMETHAMINE 15 MG: 30 INJECTION, SOLUTION INTRAMUSCULAR at 00:27

## 2020-07-16 NOTE — ED NOTES
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

## 2020-07-16 NOTE — DISCHARGE INSTRUCTIONS
Groin Strain  GENERAL INFORMATION:   A groin strain  occurs when a muscle or tendon is stretched or torn  The groin is the area where your abdomen meets your upper leg  Tendons are cords of tissue that attach muscle to bone  Common symptoms include the following:   Muscle spasms    Sudden pain or tenderness    Trouble moving the leg on the injured side  Treatment for a groin strain  may include pain medicine  You may also need a support device, such as crutches or a cane, to decrease pain as you move around  Care for your groin strain:   Rest  to help decrease the risk of more damage to your groin  Avoid activities that cause you pain  Use crutches or a cane as directed  Apply ice  on your groin for 15 to 20 minutes every hour or as directed  Use an ice pack, or put crushed ice in a plastic bag  Cover it with a towel  Ice helps prevent tissue damage and decreases swelling and pain  Elevate  the leg on your injured side as often as you can  This will help decrease swelling and pain in your groin  Prop your leg on pillows or blankets to keep it elevated comfortably

## 2020-07-16 NOTE — ED PROVIDER NOTES
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 Taking? Cetirizine HCl (ZYRTEC ALLERGY PO)  Self Yes Yes   Sig: Take by mouth  DULoxetine (CYMBALTA) 30 mg delayed release capsule  Self Yes Yes   Sig: Take 30 mg by mouth daily   albuterol (ProAir HFA) 90 mcg/act inhaler  Self No Yes   Sig: Inhale 2 puffs every 4 (four) hours as needed for wheezing or shortness of breath   diclofenac sodium (VOLTAREN) 1 %  Self No Yes   Sig: Apply 2 g topically 4 (four) times a day   fluticasone (FLONASE) 50 mcg/act nasal spray  Self Yes Yes   Si spray into each nostril daily  methocarbamol (ROBAXIN) 750 mg tablet Not Taking at Unknown time Self Yes No   Sig: Take 750 mg by mouth 3 (three) times a day     mometasone-formoterol (DULERA) 100-5 MCG/ACT inhaler  Self No Yes   Sig: Inhale 2 puffs 2 (two) times a day Rinse mouth after use    montelukast (SINGULAIR) 10 mg tablet   No Yes   Sig: Take 1 tablet (10 mg total) by mouth daily at bedtime   oxyCODONE (ROXICODONE) 10 MG TABS  Self Yes Yes   Sig: Take 10 mg by mouth every 6 (six) hours as needed    pregabalin (LYRICA) 150 mg capsule  Self Yes Yes   Si mg 4 (four) times a day    tiZANidine (ZANAFLEX) 4 mg tablet  Self Yes Yes   Sig: Take 4 mg by mouth 2 (two) times a day as needed      Facility-Administered Medications: None       Past Medical History:   Diagnosis Date    Asthma     Brain aneurysm     Chronic pain     back       Past Surgical History:   Procedure Laterality Date    BACK SURGERY      LUMBAR DISCECTOMY  2015    & fusion; L5-S1       Family History   Problem Relation Age of Onset    Cancer Mother     Cancer Father     Diabetes Father     Diabetes Sister     No Known Problems Maternal Grandmother     No Known Problems Maternal Grandfather     No Known Problems Paternal Grandmother     Diabetes Paternal Grandfather      I have reviewed and agree with the history as documented  E-Cigarette/Vaping     E-Cigarette/Vaping Substances     Social History     Tobacco Use    Smoking status: Current Every Day Smoker     Packs/day: 0 25     Types: Cigarettes    Smokeless tobacco: Never Used   Substance Use Topics    Alcohol use: No    Drug use: Not Currently     Types: Marijuana     Comment: patient had medical marijuana        Review of Systems   Constitutional: Negative for appetite change, chills and fever  HENT: Negative for sore throat  Respiratory: Negative for cough, shortness of breath and wheezing  Cardiovascular: Negative for chest pain and palpitations  Gastrointestinal: Negative for abdominal pain, diarrhea, nausea and vomiting  Genitourinary: Negative for dysuria and hematuria  Musculoskeletal: Negative for neck pain  Skin: Negative for rash  Neurological: Negative for dizziness, weakness and headaches  Psychiatric/Behavioral: Negative for suicidal ideas  All other systems reviewed and are negative  Physical Exam  Physical Exam   Constitutional: He is oriented to person, place, and time  He appears well-developed and well-nourished  HENT:   Head: Normocephalic and atraumatic     Right Ear: External ear normal    Left Ear: External ear normal    Nose: Nose normal    Mouth/Throat: Oropharynx is clear and moist    Eyes: Pupils are equal, round, and reactive to light  Conjunctivae and EOM are normal    Neck: Normal range of motion  Neck supple  Cardiovascular: Normal rate  Pulmonary/Chest: Effort normal    Abdominal: There is no tenderness  Hernia confirmed negative in the right inguinal area and confirmed negative in the left inguinal area  Genitourinary: Right testis shows no swelling  Left testis shows no swelling  Musculoskeletal:        Right hip: He exhibits decreased range of motion (at right inguinal area) and tenderness (right inguinal area)  He exhibits no swelling  He is more comfortable with his knees tucked up, but he can extend his leg, then with elevation is causes pain   Lymphadenopathy: No inguinal adenopathy noted on the right or left side  Neurological: He is alert and oriented to person, place, and time  No cranial nerve deficit  Coordination normal    Skin: Skin is warm and dry  Psychiatric: He has a normal mood and affect  His behavior is normal  Judgment and thought content normal    Nursing note and vitals reviewed        Vital Signs  ED Triage Vitals   Temperature Pulse Respirations Blood Pressure SpO2   07/15/20 2206 07/15/20 2206 07/15/20 2206 07/15/20 2206 07/15/20 2206   98 °F (36 7 °C) 77 18 143/93 100 %      Temp Source Heart Rate Source Patient Position - Orthostatic VS BP Location FiO2 (%)   07/15/20 2206 07/15/20 2206 07/15/20 2354 07/15/20 2206 --   Temporal Monitor Sitting Right arm       Pain Score       07/15/20 2206       8           Vitals:    07/15/20 2206 07/15/20 2354   BP: 143/93 126/69   Pulse: 77 71   Patient Position - Orthostatic VS:  Sitting         Visual Acuity      ED Medications  Medications   oxyCODONE (ROXICODONE) immediate release tablet 10 mg (10 mg Oral Given 7/16/20 0027)   ketorolac (TORADOL) injection 15 mg (15 mg Intramuscular Given 7/16/20 0027)       Diagnostic Studies  Results Reviewed     None                 No orders to display              Procedures  Procedures         ED Course  ED Course as of Jul 16 0039   u Jul 16, 2020   0039 His COVID was in April, no symptoms today, he has been cleared          US AUDIT      Most Recent Value   Initial Alcohol Screen: US AUDIT-C    1  How often do you have a drink containing alcohol?  0 Filed at: 07/15/2020 2355   2  How many drinks containing alcohol do you have on a typical day you are drinking? 0 Filed at: 07/15/2020 2355   3a  Male UNDER 65: How often do you have five or more drinks on one occasion? 0 Filed at: 07/15/2020 2355   3b  FEMALE Any Age, or MALE 65+: How often do you have 4 or more drinks on one occassion? 0 Filed at: 07/15/2020 2355   Audit-C Score  0 Filed at: 07/15/2020 2355                  ALVIN/DAST-10      Most Recent Value   How many times in the past year have you    Used an illegal drug or used a prescription medication for non-medical reasons? Never Filed at: 07/15/2020 2355                                MDM      Disposition  Final diagnoses:   None     ED Disposition     None      Follow-up Information    None         Patient's Medications   Discharge Prescriptions    No medications on file     No discharge procedures on file      PDMP Review     None          ED Provider  Electronically Signed by           Loren Garcia MD  07/16/20 6630

## 2020-07-20 DIAGNOSIS — J44.9 CHRONIC OBSTRUCTIVE PULMONARY DISEASE, UNSPECIFIED COPD TYPE (HCC): ICD-10-CM

## 2021-04-13 ENCOUNTER — HOSPITAL ENCOUNTER (EMERGENCY)
Facility: HOSPITAL | Age: 41
Discharge: HOME | End: 2021-04-13
Attending: EMERGENCY MEDICINE
Payer: COMMERCIAL

## 2021-04-13 VITALS
BODY MASS INDEX: 24 KG/M2 | DIASTOLIC BLOOD PRESSURE: 83 MMHG | WEIGHT: 157.85 LBS | SYSTOLIC BLOOD PRESSURE: 131 MMHG | RESPIRATION RATE: 16 BRPM | OXYGEN SATURATION: 98 % | HEART RATE: 80 BPM | TEMPERATURE: 98.9 F

## 2021-04-13 DIAGNOSIS — M54.42 CHRONIC BILATERAL LOW BACK PAIN WITH BILATERAL SCIATICA: Primary | ICD-10-CM

## 2021-04-13 DIAGNOSIS — G89.29 CHRONIC BILATERAL LOW BACK PAIN WITH BILATERAL SCIATICA: Primary | ICD-10-CM

## 2021-04-13 DIAGNOSIS — M54.41 CHRONIC BILATERAL LOW BACK PAIN WITH BILATERAL SCIATICA: Primary | ICD-10-CM

## 2021-04-13 PROCEDURE — 99283 EMERGENCY DEPT VISIT LOW MDM: CPT

## 2021-04-13 PROCEDURE — 99284 EMERGENCY DEPT VISIT MOD MDM: CPT | Performed by: EMERGENCY MEDICINE

## 2021-04-13 PROCEDURE — 96372 THER/PROPH/DIAG INJ SC/IM: CPT

## 2021-04-13 RX ORDER — LIDOCAINE 50 MG/G
2 PATCH TOPICAL ONCE
Status: DISCONTINUED | OUTPATIENT
Start: 2021-04-13 | End: 2021-04-13 | Stop reason: HOSPADM

## 2021-04-13 RX ORDER — PREGABALIN 75 MG/1
150 CAPSULE ORAL ONCE
Status: COMPLETED | OUTPATIENT
Start: 2021-04-13 | End: 2021-04-13

## 2021-04-13 RX ORDER — MORPHINE SULFATE 4 MG/ML
7 INJECTION, SOLUTION INTRAMUSCULAR; INTRAVENOUS ONCE
Status: COMPLETED | OUTPATIENT
Start: 2021-04-13 | End: 2021-04-13

## 2021-04-13 RX ORDER — KETOROLAC TROMETHAMINE 30 MG/ML
15 INJECTION, SOLUTION INTRAMUSCULAR; INTRAVENOUS ONCE
Status: COMPLETED | OUTPATIENT
Start: 2021-04-13 | End: 2021-04-13

## 2021-04-13 RX ADMIN — MORPHINE SULFATE 7 MG: 4 INJECTION INTRAVENOUS at 19:50

## 2021-04-13 RX ADMIN — LIDOCAINE 2 PATCH: 50 PATCH CUTANEOUS at 20:42

## 2021-04-13 RX ADMIN — KETOROLAC TROMETHAMINE 15 MG: 30 INJECTION, SOLUTION INTRAMUSCULAR at 19:52

## 2021-04-13 RX ADMIN — PREGABALIN 150 MG: 75 CAPSULE ORAL at 20:41

## 2021-04-13 NOTE — ED PROVIDER NOTES
History  Chief Complaint   Patient presents with    Back Pain     Pt reports having injections in lower back yesterday  Pt reports worsen pain today     Pt is a 36year old male with a PMH of chronic lumbar radiculopathy, lumbar discectomy, DDD presenting with back pain  Pt states he received steroid injections in his lower back yesterday for his chronic pain  States today he has had increased pain in his b/l lumbar back  He has been taking 10 mg oxycodone, Tylenol and Lyrica at home without relief  States the pain has affected his gait  Denies fevers, loss of bowel and bladder, saddle anesthesia, IV drug abuse  States the pain radiates down both legs posteriorly  History provided by:  Patient   used: No    Back Pain  Location:  Lumbar spine  Quality:  Aching and shooting  Radiates to:  R posterior upper leg and L posterior upper leg  Pain severity:  Moderate  Pain is:  Same all the time  Onset quality:  Gradual  Timing:  Constant  Progression:  Worsening  Chronicity:  Chronic  Context: not lifting heavy objects, not physical stress, not recent illness and not recent injury    Relieved by:  Nothing  Worsened by:  Ambulation and movement  Ineffective treatments:  Narcotics  Risk factors: steroid use    Risk factors: no hx of cancer, no hx of osteoporosis and no recent surgery        Prior to Admission Medications   Prescriptions Last Dose Informant Patient Reported? Taking? Cetirizine HCl (ZYRTEC ALLERGY PO)  Self Yes No   Sig: Take by mouth  DULoxetine (CYMBALTA) 30 mg delayed release capsule  Self Yes No   Sig: Take 30 mg by mouth daily   albuterol (ProAir HFA) 90 mcg/act inhaler  Self No No   Sig: Inhale 2 puffs every 4 (four) hours as needed for wheezing or shortness of breath   diclofenac sodium (VOLTAREN) 1 %  Self No No   Sig: Apply 2 g topically 4 (four) times a day   fluticasone (FLONASE) 50 mcg/act nasal spray  Self Yes No   Si spray into each nostril daily  methocarbamol (ROBAXIN) 750 mg tablet  Self Yes No   Sig: Take 750 mg by mouth 3 (three) times a day  mometasone-formoterol (DULERA) 100-5 MCG/ACT inhaler   No No   Sig: Inhale 2 puffs 2 (two) times a day Rinse mouth after use    montelukast (SINGULAIR) 10 mg tablet   No No   Sig: Take 1 tablet (10 mg total) by mouth daily at bedtime   oxyCODONE (ROXICODONE) 10 MG TABS  Self Yes No   Sig: Take 10 mg by mouth every 6 (six) hours as needed    pregabalin (LYRICA) 150 mg capsule  Self Yes No   Si mg 4 (four) times a day    tiZANidine (ZANAFLEX) 4 mg tablet  Self Yes No   Sig: Take 4 mg by mouth 2 (two) times a day as needed      Facility-Administered Medications: None       Past Medical History:   Diagnosis Date    Asthma     Brain aneurysm     Chronic pain     back       Past Surgical History:   Procedure Laterality Date    BACK SURGERY      LUMBAR DISCECTOMY  2015    & fusion; L5-S1       Family History   Problem Relation Age of Onset    Cancer Mother     Cancer Father     Diabetes Father     Diabetes Sister     No Known Problems Maternal Grandmother     No Known Problems Maternal Grandfather     No Known Problems Paternal Grandmother     Diabetes Paternal Grandfather      I have reviewed and agree with the history as documented  E-Cigarette/Vaping     E-Cigarette/Vaping Substances     Social History     Tobacco Use    Smoking status: Current Every Day Smoker     Packs/day: 0 25     Types: Cigarettes    Smokeless tobacco: Never Used   Substance Use Topics    Alcohol use: No    Drug use: Not Currently     Types: Marijuana     Comment: patient had medical marijuana        Review of Systems   Constitutional: Negative  HENT: Negative  Respiratory: Negative  Cardiovascular: Negative  Gastrointestinal: Negative  Genitourinary: Negative  Musculoskeletal: Positive for back pain  Neurological: Negative  All other systems reviewed and are negative        Physical Exam  Physical Exam  Constitutional:       General: He is not in acute distress  Appearance: He is well-developed  He is not diaphoretic  HENT:      Head: Normocephalic and atraumatic  Right Ear: External ear normal       Left Ear: External ear normal       Nose: Nose normal    Eyes:      General: No scleral icterus  Right eye: No discharge  Left eye: No discharge  Extraocular Movements: Extraocular movements intact  Conjunctiva/sclera: Conjunctivae normal    Neck:      Musculoskeletal: Normal range of motion and neck supple  Cardiovascular:      Rate and Rhythm: Normal rate and regular rhythm  Pulses:           Dorsalis pedis pulses are 2+ on the right side and 2+ on the left side  Heart sounds: Normal heart sounds  Pulmonary:      Effort: Pulmonary effort is normal       Breath sounds: Normal breath sounds  Abdominal:      General: Abdomen is flat  Bowel sounds are normal  There is no distension  Tenderness: There is no abdominal tenderness  Musculoskeletal:      Right hip: Normal       Left hip: Normal       Right knee: Normal       Left knee: Normal       Right ankle: Normal       Left ankle: Normal       Thoracic back: Normal       Lumbar back: He exhibits decreased range of motion, tenderness and pain  He exhibits no bony tenderness, no swelling, no edema, no deformity, no laceration, no spasm and normal pulse  Comments: TTP of the b/l lumbar paraspinal muscles without erythema, ecchymosis, swelling  Neurovascularly in tact distally  Skin:     General: Skin is warm and dry  Neurological:      Mental Status: He is alert and oriented to person, place, and time     Psychiatric:         Mood and Affect: Mood normal          Behavior: Behavior normal          Vital Signs  ED Triage Vitals   Temperature Pulse Respirations Blood Pressure SpO2   04/13/21 1746 04/13/21 1746 04/13/21 1746 04/13/21 1746 04/13/21 1746   98 9 °F (37 2 °C) 96 16 (!) 164/102 100 %      Temp Source Heart Rate Source Patient Position - Orthostatic VS BP Location FiO2 (%)   04/13/21 1746 04/13/21 1746 04/13/21 1950 04/13/21 1950 --   Oral Monitor Lying Right arm       Pain Score       04/13/21 1746       8           Vitals:    04/13/21 1746 04/13/21 1950   BP: (!) 164/102 131/83   Pulse: 96 80   Patient Position - Orthostatic VS:  Lying         Visual Acuity      ED Medications  Medications   lidocaine (LIDODERM) 5 % patch 2 patch (2 patches Topical Medication Applied 4/13/21 2042)   morphine (PF) 4 mg/mL injection 7 mg (7 mg Intramuscular Given 4/13/21 1950)   ketorolac (TORADOL) injection 15 mg (15 mg Intramuscular Given 4/13/21 1952)   pregabalin (LYRICA) capsule 150 mg (150 mg Oral Given 4/13/21 2041)       Diagnostic Studies  Results Reviewed     None                 No orders to display              Procedures  Procedures         ED Course  ED Course as of Apr 13 2115 Tue Apr 13, 2021 2030 Pt states he had minimal improvement from the morphine and Toradol  Vitals improved with medications  Pt requesting to attempt his nightly Lyrica with Lidocaine patches  Will reassess  2113 Pt improved after reassessment and is agreeable to discharge home  Instructed pt to be careful with taking his oxycodone as he received morphine in the ED and he could have potential respiratory depression or side effects  He understands  I suspect his symptoms to be from his chronic pain and do not suspect based on history and exam that he has other forms of emergent back pain  MDM  Number of Diagnoses or Management Options  Chronic bilateral low back pain with bilateral sciatica: new and requires workup  Risk of Complications, Morbidity, and/or Mortality  Presenting problems: high  Management options: high  General comments: Given IM morphine in the ED for pain relief       Patient Progress  Patient progress: improved      Disposition  Final diagnoses: Chronic bilateral low back pain with bilateral sciatica     Time reflects when diagnosis was documented in both MDM as applicable and the Disposition within this note     Time User Action Codes Description Comment    4/13/2021  9:08 PM Patricesengizaiah Hernandez Add [M54 42,  M54 41,  G89 29] Chronic bilateral low back pain with bilateral sciatica       ED Disposition     ED Disposition Condition Date/Time Comment    Discharge Good Tu Apr 13, 2021  9:08 PM Bethel Fulton discharge to home/self care  Follow-up Information    None         Patient's Medications   Discharge Prescriptions    No medications on file     No discharge procedures on file      PDMP Review     None          ED Provider  Electronically Signed by           Juany Dumont PA-C  04/13/21 9574

## 2021-04-13 NOTE — Clinical Note
Felicia Clara was seen and treated in our emergency department on 4/13/2021  Diagnosis:     Shashank Willingham  may return to work on return date  He may return on this date: 04/15/2021         If you have any questions or concerns, please don't hesitate to call        Cleveland Sharma PA-C    ______________________________           _______________          _______________  Hospital Representative                              Date                                Time

## 2021-04-14 NOTE — DISCHARGE INSTRUCTIONS
Back Pain   WHAT YOU NEED TO KNOW:   Back pain is common  It can be caused by many conditions, such as arthritis or the breakdown of spinal discs  Your risk for back pain is increased by injuries, lack of activity, or repeated bending and twisting  You may feel sore or stiff on one or both sides of your back  The pain may spread to your buttocks or thighs  DISCHARGE INSTRUCTIONS:   Return to the emergency department if:   · You have pain, numbness, or weakness in one or both legs  · Your pain becomes so severe that you cannot walk  · You cannot control your urine or bowel movements  · You have severe back pain with chest pain  · You have severe back pain, nausea, and vomiting  · You have severe back pain that spreads to your side or genital area  Contact your healthcare provider if:   · You have back pain that does not get better with rest and pain medicine  · You have a fever  · You have pain that worsens when you are on your back or when you rest     · You have pain that worsens when you cough or sneeze  · You lose weight without trying  · You have questions or concerns about your condition or care  Medicines:   · NSAIDs  help decrease swelling and pain  This medicine is available with or without a doctor's order  NSAIDs can cause stomach bleeding or kidney problems in certain people  If you take blood thinner medicine, always ask your healthcare provider if NSAIDs are safe for you  Always read the medicine label and follow directions  · Acetaminophen  decreases pain and fever  It is available without a doctor's order  Ask how much to take and how often to take it  Follow directions  Read the labels of all other medicines you are using to see if they also contain acetaminophen, or ask your doctor or pharmacist  Acetaminophen can cause liver damage if not taken correctly  Do not use more than 4 grams (4,000 milligrams) total of acetaminophen in one day       · Muscle relaxers help decrease muscle spasms and back pain  · Prescription pain medicine  may be given  Ask your healthcare provider how to take this medicine safely  Some prescription pain medicines contain acetaminophen  Do not take other medicines that contain acetaminophen without talking to your healthcare provider  Too much acetaminophen may cause liver damage  Prescription pain medicine may cause constipation  Ask your healthcare provider how to prevent or treat constipation  · Take your medicine as directed  Contact your healthcare provider if you think your medicine is not helping or if you have side effects  Tell him or her if you are allergic to any medicine  Keep a list of the medicines, vitamins, and herbs you take  Include the amounts, and when and why you take them  Bring the list or the pill bottles to follow-up visits  Carry your medicine list with you in case of an emergency  How to manage your back pain:   · Apply ice  on your back for 15 to 20 minutes every hour or as directed  Use an ice pack, or put crushed ice in a plastic bag  Cover it with a towel before you apply it to your skin  Ice helps prevent tissue damage and decreases pain  · Apply heat  on your back for 20 to 30 minutes every 2 hours for as many days as directed  Heat helps decrease pain and muscle spasms  · Stay active  as much as you can without causing more pain  Bed rest could make your back pain worse  Avoid heavy lifting until your pain is gone  · Go to physical therapy as directed  A physical therapist can teach you exercises to help improve movement and strength, and to decrease pain  Follow up with your healthcare provider in 2 weeks, or as directed:  Write down your questions so you remember to ask them during your visits  © Copyright [address] Information is for End User's use only and may not be sold, redistributed or otherwise used for commercial purposes   All illustrations and images included in CareNotes® are the copyrighted property of [employer]  or [employer]   The above information is an  only  It is not intended as medical advice for individual conditions or treatments  Talk to your doctor, nurse or pharmacist before following any medical regimen to see if it is safe and effective for you

## 2021-08-02 ENCOUNTER — APPOINTMENT (OUTPATIENT)
Dept: URGENT CARE | Age: 41
End: 2021-08-02

## 2021-08-02 DIAGNOSIS — Z02.1 PHYSICAL EXAM, PRE-EMPLOYMENT: Primary | ICD-10-CM

## 2021-08-02 PROCEDURE — U0003 INFECTIOUS AGENT DETECTION BY NUCLEIC ACID (DNA OR RNA); SEVERE ACUTE RESPIRATORY SYNDROME CORONAVIRUS 2 (SARS-COV-2) (CORONAVIRUS DISEASE [COVID-19]), AMPLIFIED PROBE TECHNIQUE, MAKING USE OF HIGH THROUGHPUT TECHNOLOGIES AS DESCRIBED BY CMS-2020-01-R: HCPCS | Performed by: NURSE PRACTITIONER

## 2021-08-02 PROCEDURE — U0005 INFEC AGEN DETEC AMPLI PROBE: HCPCS | Performed by: NURSE PRACTITIONER

## 2021-08-03 LAB — SARS-COV-2 RNA RESP QL NAA+PROBE: NEGATIVE

## 2021-12-08 ENCOUNTER — TELEMEDICINE (OUTPATIENT)
Dept: FAMILY MEDICINE CLINIC | Facility: CLINIC | Age: 41
End: 2021-12-08

## 2021-12-08 DIAGNOSIS — B34.9 VIRAL INFECTION, UNSPECIFIED: Primary | ICD-10-CM

## 2021-12-08 DIAGNOSIS — J01.90 ACUTE NON-RECURRENT SINUSITIS, UNSPECIFIED LOCATION: ICD-10-CM

## 2021-12-08 DIAGNOSIS — R05.9 COUGH: ICD-10-CM

## 2021-12-08 PROCEDURE — 0241U HB NFCT DS VIR RESP RNA 4 TRGT: CPT | Performed by: NURSE PRACTITIONER

## 2021-12-08 PROCEDURE — 99441 PR PHYS/QHP TELEPHONE EVALUATION 5-10 MIN: CPT | Performed by: NURSE PRACTITIONER

## 2021-12-08 RX ORDER — ALBUTEROL SULFATE 90 UG/1
2 AEROSOL, METERED RESPIRATORY (INHALATION) EVERY 6 HOURS PRN
Qty: 18 G | Refills: 5 | Status: SHIPPED | OUTPATIENT
Start: 2021-12-08

## 2021-12-08 RX ORDER — AMOXICILLIN AND CLAVULANATE POTASSIUM 875; 125 MG/1; MG/1
1 TABLET, FILM COATED ORAL EVERY 12 HOURS SCHEDULED
Qty: 20 TABLET | Refills: 0 | Status: SHIPPED | OUTPATIENT
Start: 2021-12-08 | End: 2021-12-10 | Stop reason: SDUPTHER

## 2021-12-09 ENCOUNTER — TELEPHONE (OUTPATIENT)
Dept: FAMILY MEDICINE CLINIC | Facility: CLINIC | Age: 41
End: 2021-12-09

## 2021-12-09 DIAGNOSIS — J10.1 INFLUENZA A: Primary | ICD-10-CM

## 2021-12-09 LAB
FLUAV RNA RESP QL NAA+PROBE: POSITIVE
FLUBV RNA RESP QL NAA+PROBE: NEGATIVE
RSV RNA RESP QL NAA+PROBE: NEGATIVE
SARS-COV-2 RNA RESP QL NAA+PROBE: NEGATIVE

## 2021-12-09 RX ORDER — OSELTAMIVIR PHOSPHATE 75 MG/1
75 CAPSULE ORAL 2 TIMES DAILY
Qty: 10 CAPSULE | Refills: 0 | Status: SHIPPED | OUTPATIENT
Start: 2021-12-09 | End: 2021-12-10 | Stop reason: SDUPTHER

## 2021-12-10 DIAGNOSIS — J10.1 INFLUENZA A: ICD-10-CM

## 2021-12-10 DIAGNOSIS — J01.90 ACUTE NON-RECURRENT SINUSITIS, UNSPECIFIED LOCATION: ICD-10-CM

## 2021-12-10 RX ORDER — AMOXICILLIN AND CLAVULANATE POTASSIUM 875; 125 MG/1; MG/1
1 TABLET, FILM COATED ORAL EVERY 12 HOURS SCHEDULED
Qty: 20 TABLET | Refills: 0 | Status: SHIPPED | OUTPATIENT
Start: 2021-12-10 | End: 2021-12-20

## 2021-12-10 RX ORDER — OSELTAMIVIR PHOSPHATE 75 MG/1
75 CAPSULE ORAL 2 TIMES DAILY
Qty: 10 CAPSULE | Refills: 0 | Status: SHIPPED | OUTPATIENT
Start: 2021-12-10 | End: 2021-12-15

## 2022-11-03 ENCOUNTER — OFFICE VISIT (OUTPATIENT)
Dept: OBGYN CLINIC | Facility: CLINIC | Age: 42
End: 2022-11-03

## 2022-11-03 ENCOUNTER — HOSPITAL ENCOUNTER (OUTPATIENT)
Dept: MRI IMAGING | Facility: HOSPITAL | Age: 42
End: 2022-11-03

## 2022-11-03 VITALS
DIASTOLIC BLOOD PRESSURE: 82 MMHG | BODY MASS INDEX: 23.79 KG/M2 | SYSTOLIC BLOOD PRESSURE: 126 MMHG | HEIGHT: 68 IN | WEIGHT: 157 LBS | HEART RATE: 88 BPM

## 2022-11-03 DIAGNOSIS — M25.462 EFFUSION OF LEFT KNEE: ICD-10-CM

## 2022-11-03 DIAGNOSIS — M23.92 KNEE LOCKING, LEFT: ICD-10-CM

## 2022-11-03 DIAGNOSIS — M25.562 KNEE MENISCUS PAIN, LEFT: ICD-10-CM
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
## 2022-11-03 DIAGNOSIS — M23.92 ACUTE INTERNAL DERANGEMENT OF LEFT KNEE: ICD-10-CM

## 2022-11-03 DIAGNOSIS — M25.562 ACUTE PAIN OF LEFT KNEE: ICD-10-CM

## 2022-11-03 DIAGNOSIS — M25.562 ACUTE PAIN OF LEFT KNEE: Primary | ICD-10-CM

## 2022-11-03 NOTE — PROGRESS NOTES
Orthopaedic Surgery - Office Note  Chasity Pope (29 y o  male)   : 1980   MRN: [id number]  Encounter Date: 11/3/2022    Chief Complaint   Patient presents with   • Left Knee - Pain         Assessment/Plan  Diagnoses and all orders for this visit:    Acute pain of left knee  -     XR knee 4+ vw left injury; Future  -     MRI knee left  wo contrast; Future    Work related injury    Effusion of left knee  -     MRI knee left  wo contrast; Future    Knee locking, left  -     MRI knee left  wo contrast; Future    Knee meniscus pain, left  -     MRI knee left  wo contrast; Future    Acute internal derangement of left knee  -     MRI knee left  wo contrast; Future    Jayjay injured his left knee while at work on 10/31/2022  He currently has a locked knee at 70° of flexion and cannot be fully examined  I am concerned about a bucket-handle meniscus tear with this acute injury  I would recommend a stat MRI to evaluate for a acute bucket-handle medial meniscus tear  I did review with the patient if there is no abnormalities on MRI he may need an aggressive physical therapy program   He will use icing 20 minutes on 1 hour off 3 times a day and continued use of the crutches to protect his weight-bearing  He may discontinue the knee immobilizer at this time  Patient does not feel comfortable using aspirin for DVT prophylaxis due to his aneurysm history  I have encouraged foot pumps to try and prevent DVT  He will be placed on sedentary duty only work restrictions  Return for Recheck with Dr Cachorro Foster to discuss MRI  History of Present Illness  This is a new patient with left knee pain  Patient reports this is a work-related injury on 10/31/2022  States that while lifting an item at work and twisting felt a sharp pain in the medial aspect of his knee with an audible pop  Had immediate pain and discomfort  [facility]   We do not have any records from this visit    He denies previous problems with the left knee  He has been ambulating with crutches and reports any weight-bearing causes severe medial knee pain  He reports the knee has been swollen and he cannot bend it  He has been using the knee immobilizer from [facility]   He denies any calf pain  He has a history of a brain aneurysm and does not use aspirin  He is a contributing medical history of chronic back pain  Review of Systems  Pertinent items are noted in HPI  All other systems were reviewed and are negative  Physical Exam  /82   Pulse 88   Ht 5' 8" (1 727 m)   Wt 71 2 kg (157 lb)   BMI 23 87 kg/m²   Cons: Appears well  No apparent distress  Psych: Alert  Oriented x3  Mood and affect normal   Eyes: PERRLA, EOMI  Resp: Normal effort  No audible wheezing or stridor  CV: Palpable pulse  No discernable arrhythmia  Lymph:  No palpable cervical, axillary, or inguinal lymphadenopathy  Skin: Warm  No palpable masses  No visible lesions  Neuro: Normal muscle tone  Normal and symmetric DTR's  Patient's left knee is without skin breakdown lesion or signs of infection he has full extension and flexes only to 70°  Attempts to passively flex beyond 70° causes patient significant pain apprehension  He is markedly tender on the medial joint line  He is nontender lateral joint line  He has no pain or instability with valgus or varus stressing at full extension and 30° of flexion  I am unable to assess medial or lateral Gila's due to the patient's locked knee at 70° of flexion and pain  He has no calf tenderness and a negative Homans  His extensor mechanism is intact on physical exam   I do not appreciate any instability to with Lachman's or posterior drawer although there is significant patient guarding and apprehension  Patient is unable to bear weight on his left leg at this time            Studies Reviewed  [facility] images reviewed from [facility] by myself show no acute fractures or dislocations  Joint spaces are well maintained  This was read from an orthopedic standpoint await official radiologist interpretation  We will attempt to obtain official radiologist review and office note records from [facility]     Procedures  No procedures today  Medical, Surgical, Family, and Social History  The patient's medical history, family history, and social history, were reviewed and updated as appropriate  Past Medical History:   Diagnosis Date   • Asthma    • Brain aneurysm    • Chronic pain     back       Past Surgical History:   Procedure Laterality Date   • BACK SURGERY     • LUMBAR DISCECTOMY  2015    & fusion;  L5-S1       Family History   Problem Relation Age of Onset   • Cancer Mother    • Cancer Father    • Diabetes Father    • Diabetes Sister    • No Known Problems Maternal Grandmother    • No Known Problems Maternal Grandfather    • No Known Problems Paternal Grandmother    • Diabetes Paternal Grandfather        Social History     Occupational History   • Not on file   Tobacco Use   • Smoking status: Current Every Day Smoker     Packs/day: 0 25     Types: Cigarettes   • Smokeless tobacco: Never Used   Vaping Use   • Vaping Use: Never used   Substance and Sexual Activity   • Alcohol use: No   • Drug use: Not Currently     Types: Marijuana     Comment: patient had medical marijuana    • Sexual activity: Not on file       Allergies   Allergen Reactions   • Shellfish-Derived Products - Food Allergy Anaphylaxis         Current Outpatient Medications:   •  albuterol (Ventolin HFA) 90 mcg/act inhaler, Inhale 2 puffs every 6 (six) hours as needed for wheezing, Disp: 18 g, Rfl: 5      Adelfo Myles PA-C

## 2022-11-03 NOTE — LETTER
November 3, 2022     Patient: Claudia Angel  YOB: 1980  Date of Visit: 11/3/2022      To Whom it May Concern:    [place] is under my professional care  Rheta Goodell was seen in my office on 11/3/2022  Rheta Goodell may return to work with limitations :  Sedentary duty only, must use crutches  If you have any questions or concerns, please don't hesitate to call           Sincerely,          Lewis Sanderson PA-C        CC: No Recipients

## 2022-11-03 NOTE — PATIENT INSTRUCTIONS
Meniscus Tear   WHAT YOU NEED TO KNOW:   What is a meniscus tear? A meniscus tear is a tear in the cartilage of your knee  The meniscus is a piece of cartilage (strong tissue) between your thighbone and shinbone  The meniscus helps to cushion your knee joint and keep it stable  What causes a meniscus tear? A meniscus tear can occur if you twist your knee  A meniscus tear can happen during sports that involve squatting and twisting the knee, such as football or basketball  Weak and thinned meniscus cartilage in older people can increase the risk for a meniscus tear  What are the signs and symptoms of a meniscus tear? A pop or tear when the injury happens    Pain and swelling    Tenderness    Stiffness    Popping, catching, or locking of your knee    Not being able to extend your knee fully    How is a meniscus tear diagnosed? Your healthcare provider will examine your knee  He may bend your knee and then straighten and rotate it  He may also order x-rays and an MRI of your knee  An MRI takes pictures of your knee to show the meniscus tear  You may be given contrast liquid to help the tear show up better  Tell the healthcare provider if you have ever had an allergic reaction to contrast liquid  Do not enter the MRI room with anything metal  Metal can cause serious injury  Tell the healthcare provider if you have any metal in or on your body  How is a meniscus tear treated? Treatment depends on the type of tear you have  Some types of meniscus tears can heal on their own  You may need any of the following:  NSAIDs , such as ibuprofen, help decrease swelling, pain, and fever  This medicine is available with or without a doctor's order  NSAIDs can cause stomach bleeding or kidney problems in certain people  If you take blood thinner medicine, always ask if NSAIDs are safe for you  Always read the medicine label and follow directions   Do not give these medicines to children under 10months of age without direction from your child's healthcare provider  Rest  your knee  Avoid activities that make the swelling or pain worse  You may need to avoid putting weight on your leg while you have pain  Your healthcare provider may recommend that you use crutches  Apply ice  on your knee for 15 to 20 minutes every hour or as directed  Use an ice pack, or put crushed ice in a plastic bag  Cover it with a towel  Ice helps prevent tissue damage and decreases swelling and pain  Compress  your knee with an elastic bandage, air cast, medical boot, or splint to reduce swelling  Ask your healthcare provider which compression device to use, and how tight it should be  Elevate  your knee above the level of your heart as often as you can  This will help decrease swelling and pain  Prop your knee on pillows or blankets to keep it elevated comfortably  Surgery  may be needed if your symptoms do not improve  Your healthcare provider may trim away or repair damaged tissue  Call 911 for any of the following: Your arm or leg feels warm, tender, and painful  It may look swollen and red  When should I seek immediate care? You cannot move your knee at all  When should I contact my healthcare provider? Your symptoms do not improve with treatment  You have questions or concerns about your condition or care  CARE AGREEMENT:   You have the right to help plan your care  Learn about your health condition and how it may be treated  Discuss treatment options with your healthcare providers to decide what care you want to receive  You always have the right to refuse treatment  The above information is an  only  It is not intended as medical advice for individual conditions or treatments  Talk to your doctor, nurse or pharmacist before following any medical regimen to see if it is safe and effective for you    © Copyright [employer] 2022 Information is for End User's use only and may not be sold, redistributed or otherwise used for commercial purposes   All illustrations and images included in CareNotes® are the copyrighted property of A [employer] , Inc  or [facility] Cesar Alvarez

## 2022-11-04 ENCOUNTER — TELEPHONE (OUTPATIENT)
Dept: OBGYN CLINIC | Facility: CLINIC | Age: 42
End: 2022-11-04

## 2022-11-04 ENCOUNTER — OFFICE VISIT (OUTPATIENT)
Dept: OBGYN CLINIC | Facility: CLINIC | Age: 42
End: 2022-11-04

## 2022-11-04 VITALS — HEIGHT: 68 IN | BODY MASS INDEX: 23.79 KG/M2 | WEIGHT: 157 LBS

## 2022-11-04 DIAGNOSIS — S83.232D COMPLEX TEAR OF MEDIAL MENISCUS OF LEFT KNEE AS CURRENT INJURY, SUBSEQUENT ENCOUNTER: Primary | ICD-10-CM

## 2022-11-04 NOTE — LETTER
November 4, 2022     Patient: Claudia Angel  YOB: 1980  Date of Visit: 11/4/2022      To Whom it May Concern:    [place] is under my professional care  Rheta Goodell was seen in my office on 11/4/2022  Rheta Goodell will need surgery on his left knee  I will provided an exact time line for recovery and return to work after surgery is completed  If you have any questions or concerns, please don't hesitate to call           Sincerely,          Vasquez Gupta MD

## 2022-11-04 NOTE — PROGRESS NOTES
Ortho Sports Medicine New Patient Visit     Assesment:   43 y o  male with left medial meniscus tear    Plan: We had a long discussion regarding treatment options  We discussed operative versus non operative treatment options  Given his extreme level pain, inability to fully extend the knee, the patient opted to proceed with surgical intervention  I agreed and recommended a left knee arthroscopy with possible medial meniscus repair versus partial medial meniscectomy  We discussed the risks benefits and alternatives  We discussed that his best chance of having a normal knee would be if we are able to repair the meniscus  However I will note this is possible until we are inside the knee  I did also discuss that if I do repair the meniscus there is a chance that the repair could fail he may need a subsequent surgery for meniscectomy  He expressed understanding and elected to proceed  This will be scheduled in the near future  Placed a referral to physical therapy which I recommend starting within 3 days after surgery  Knee brace provided today  Follow-up 10-14 days after surgery  Chief Complaint   Patient presents with   • Left Knee - Follow-up       History of Present Illness: The patient is a 43 y o  male was previously seen by Suzie Yap and sent for an MRI which is available today for review  He continues to have severe pain in the knee and inability to fully extend the knee  Pain is all medially based  Knee Surgical History:  None    Past Medical, Social and Family History:  Past Medical History:   Diagnosis Date   • Asthma    • Brain aneurysm    • Chronic pain     back     Past Surgical History:   Procedure Laterality Date   • BACK SURGERY     • LUMBAR DISCECTOMY  2015    & fusion;  L5-S1     Allergies   Allergen Reactions   • Shellfish-Derived Products - Food Allergy Anaphylaxis     Current Outpatient Medications on File Prior to Visit   Medication Sig Dispense Refill   • albuterol (Ventolin HFA) 90 mcg/act inhaler Inhale 2 puffs every 6 (six) hours as needed for wheezing 18 g 5     No current facility-administered medications on file prior to visit  Social History     Socioeconomic History   • Marital status: /Civil Union     Spouse name: Not on file   • Number of children: Not on file   • Years of education: Not on file   • Highest education level: Not on file   Occupational History   • Not on file   Tobacco Use   • Smoking status: Current Every Day Smoker     Packs/day: 0 25     Types: Cigarettes   • Smokeless tobacco: Never Used   Vaping Use   • Vaping Use: Never used   Substance and Sexual Activity   • Alcohol use: No   • Drug use: Not Currently     Types: Marijuana     Comment: patient had medical marijuana    • Sexual activity: Not on file   Other Topics Concern   • Not on file   Social History Narrative    fhx not reviewed intriage     Social Determinants of Health     Financial Resource Strain: Not on file   Food Insecurity: Not on file   Transportation Needs: Not on file   Physical Activity: Not on file   Stress: Not on file   Social Connections: Not on file   Intimate Partner Violence: Not on file   Housing Stability: Not on file         I have reviewed the past medical, surgical, social and family history, medications and allergies as documented in the EMR  Review of systems: ROS is negative other than that noted in the HPI  Constitutional: Negative for fatigue and fever  HENT: Negative for sore throat  Respiratory: Negative for shortness of breath  Cardiovascular: Negative for chest pain  Gastrointestinal: Negative for abdominal pain  Endocrine: Negative for cold intolerance and heat intolerance  Genitourinary: Negative for flank pain  Musculoskeletal: Negative for back pain  Skin: Negative for rash  Allergic/Immunologic: Negative for immunocompromised state  Neurological: Negative for dizziness     Psychiatric/Behavioral: Negative for agitation  Physical Exam:    Height 5' 8" (1 727 m), weight 71 2 kg (157 lb)  General/Constitutional: NAD, well developed, well nourished  HENT: Normocephalic, atraumatic  CV: Intact distal pulses, regular rate  Resp: No respiratory distress or labored breathing  Lymphatic: No lymphadenopathy palpated  Neuro: Alert and Oriented x 3, no focal deficits  Psych: Normal mood, normal affect  Skin: Warm, dry, no rashes, no erythema      Knee Exam:   Moderate effusion  No skin wounds  Knee range of motion from roughly 5° short of full extension to 90 of flexion with significant pain throughout  Knee is stable to varus stress, valgus stress, Lachman and posterior drawer  Medial joint line tenderness  Neurovascular intact distally  Knee Imaging    MRI of the knee reviewed showing a large tear of the medial meniscus  There does appear to be a large horizontal cleavage component as well as radial component

## 2022-11-04 NOTE — TELEPHONE ENCOUNTER
Called pt to ask him to bring in ins card ASAP to add to coverage for surgery scheduler  Pt said that he will call back with ins id #

## 2022-11-06 ENCOUNTER — HOSPITAL ENCOUNTER (EMERGENCY)
Facility: HOSPITAL | Age: 42
Discharge: HOME | End: 2022-11-06
Attending: EMERGENCY MEDICINE

## 2022-11-06 VITALS
SYSTOLIC BLOOD PRESSURE: 129 MMHG | HEART RATE: 98 BPM | OXYGEN SATURATION: 98 % | DIASTOLIC BLOOD PRESSURE: 76 MMHG | TEMPERATURE: 98.3 F | RESPIRATION RATE: 18 BRPM

## 2022-11-06 DIAGNOSIS — M25.562 LEFT KNEE PAIN: Primary | ICD-10-CM

## 2022-11-06 RX ORDER — HYDROCODONE BITARTRATE AND ACETAMINOPHEN 5; 325 MG/1; MG/1
1 TABLET ORAL EVERY 6 HOURS PRN
Qty: 6 TABLET | Refills: 0 | Status: SHIPPED | OUTPATIENT
Start: 2022-11-06 | End: 2022-11-08

## 2022-11-06 RX ORDER — OXYCODONE HYDROCHLORIDE AND ACETAMINOPHEN 5; 325 MG/1; MG/1
1 TABLET ORAL ONCE
Status: COMPLETED | OUTPATIENT
Start: 2022-11-06 | End: 2022-11-06

## 2022-11-06 RX ORDER — KETOROLAC TROMETHAMINE 30 MG/ML
30 INJECTION, SOLUTION INTRAMUSCULAR; INTRAVENOUS ONCE
Status: COMPLETED | OUTPATIENT
Start: 2022-11-06 | End: 2022-11-06

## 2022-11-06 RX ADMIN — KETOROLAC TROMETHAMINE 30 MG: 30 INJECTION, SOLUTION INTRAMUSCULAR; INTRAVENOUS at 22:44

## 2022-11-06 RX ADMIN — OXYCODONE HYDROCHLORIDE AND ACETAMINOPHEN 1 TABLET: 5; 325 TABLET ORAL at 23:02

## 2022-11-07 ENCOUNTER — TELEPHONE (OUTPATIENT)
Dept: OBGYN CLINIC | Facility: HOSPITAL | Age: 42
End: 2022-11-07

## 2022-11-07 NOTE — ED NOTES
Pt stated to provider that his girlfriend was driving him home, pt received a narcotic medication   When wheeled out of the ED, he stated " you need to push me all the way to the parking deck because my girlfriend doesn't know how to drive and Im driving the car home " primary nurse notified     Kelly Aguilar  11/06/22 [facility]  11/06/22 7694

## 2022-11-07 NOTE — DISCHARGE INSTRUCTIONS
Follow up with your orthopedic surgeon and let them know that you were seen in the ER for your knee locking in flexion  They may want to consider moving up your surgery  Apply voltaren gel 4 times daily  Take 650mg of tylenol 3 times daily  Ice your knee 15 minutes at a time at least 3 times daily  You may take Norco as needed for severe pain not well controlled by tylenol  Return to the ER with any significant change or worsening of your symptoms

## 2022-11-07 NOTE — ED PROVIDER NOTES
History  Chief Complaint   Patient presents with   • Knee Pain     Pt c/o L knee pain and swelling onset this morning  Pt reports he tore his meniscus on 10/31 and is scheduled to have surgery on 11/21  Pt reports the pain came on suddenly this morning, took tylenol with no relief  Pt arrives in knee immobolizer     43year old M history of chronic pain presents accompanied by his girlfriend complaining of L knee pain  Patient reports injuring his L knee a week ago, had an MRI a few days ago that demonstrates a medial meniscal tear without a bucket handle component  Patient reports that he went to sit down tonight and noticed an abrupt onset of severe L knee pain and it is now locked in flexion  Took 2 tylenol prior to arrival with minimal relief of his pain  Is scheduled for surgery 11/21  Denies any recent trauma since his MRI  Denies any fevers or other complaints or concerns at this time  Prior to Admission Medications   Prescriptions Last Dose Informant Patient Reported? Taking? albuterol (Ventolin HFA) 90 mcg/act inhaler   No No   Sig: Inhale 2 puffs every 6 (six) hours as needed for wheezing      Facility-Administered Medications: None       Past Medical History:   Diagnosis Date   • Asthma    • Brain aneurysm    • Chronic pain     back       Past Surgical History:   Procedure Laterality Date   • BACK SURGERY     • LUMBAR DISCECTOMY  2015    & fusion; L5-S1       Family History   Problem Relation Age of Onset   • Cancer Mother    • Cancer Father    • Diabetes Father    • Diabetes Sister    • No Known Problems Maternal Grandmother    • No Known Problems Maternal Grandfather    • No Known Problems Paternal Grandmother    • Diabetes Paternal Grandfather      I have reviewed and agree with the history as documented      E-Cigarette/Vaping   • E-Cigarette Use Never User      E-Cigarette/Vaping Substances   • Nicotine No    • THC No    • CBD No    • Flavoring No    • Other No    • Unknown No      Social History     Tobacco Use   • Smoking status: Current Every Day Smoker     Packs/day: 0 25     Types: Cigarettes   • Smokeless tobacco: Never Used   Vaping Use   • Vaping Use: Never used   Substance Use Topics   • Alcohol use: No   • Drug use: Not Currently     Types: Marijuana     Comment: patient had medical marijuana        Review of Systems   Constitutional: Negative for chills, fatigue and fever  HENT: Negative for congestion and sore throat  Eyes: Negative for pain  Respiratory: Negative for cough, chest tightness, shortness of breath and wheezing  Cardiovascular: Negative for chest pain, palpitations and leg swelling  Gastrointestinal: Negative for abdominal pain, constipation, diarrhea, nausea and vomiting  Endocrine: Negative for polyuria  Genitourinary: Negative for dysuria  Musculoskeletal: Positive for arthralgias  Negative for back pain, myalgias and neck pain  Skin: Negative for rash  Neurological: Negative for dizziness, syncope, light-headedness and headaches  All other systems reviewed and are negative  Physical Exam  Physical Exam  Vitals reviewed  Constitutional:       General: He is not in acute distress  Appearance: Normal appearance  He is well-developed  He is not ill-appearing or toxic-appearing  HENT:      Head: Normocephalic and atraumatic  Right Ear: External ear normal       Left Ear: External ear normal       Nose: Nose normal       Mouth/Throat:      Mouth: Mucous membranes are moist    Eyes:      Conjunctiva/sclera: Conjunctivae normal       Pupils: Pupils are equal, round, and reactive to light  Pulmonary:      Effort: Pulmonary effort is normal  No respiratory distress  Musculoskeletal:         General: No deformity  Normal range of motion  Cervical back: Normal range of motion  Comments: L knee in flexion  Unable to straighten it secondary to pain  2+ DP pulse  Distal sensation intact   Mild effusion without significant increased warmth or erythema   Skin:     General: Skin is warm and dry  Capillary Refill: Capillary refill takes less than 2 seconds  Neurological:      General: No focal deficit present  Mental Status: He is alert and oriented to person, place, and time  Psychiatric:         Mood and Affect: Mood normal          Behavior: Behavior normal          Vital Signs  ED Triage Vitals   Temperature Pulse Respirations Blood Pressure SpO2   11/06/22 2220 11/06/22 2220 11/06/22 2220 11/06/22 2220 11/06/22 2220   98 3 °F (36 8 °C) 98 18 129/76 98 %      Temp Source Heart Rate Source Patient Position - Orthostatic VS BP Location FiO2 (%)   11/06/22 2220 11/06/22 2220 11/06/22 2220 11/06/22 2220 --   Oral Monitor Sitting Right arm       Pain Score       11/06/22 2244       9           Vitals:    11/06/22 2220   BP: 129/76   Pulse: 98   Patient Position - Orthostatic VS: Sitting         Visual Acuity      ED Medications  Medications   ketorolac (TORADOL) injection 30 mg (30 mg Intramuscular Given 11/6/22 2244)   oxyCODONE-acetaminophen (PERCOCET) 5-325 mg per tablet 1 tablet (1 tablet Oral Given 11/6/22 2302)       Diagnostic Studies  Results Reviewed     None                 No orders to display              Procedures  Procedures         ED Course  ED Course as of 11/07/22 0011   Sun Nov 06, 2022   2252 Discussed case with Dr Ravindra Ferrell or orthopedics  Recommends performing hyperflexion maneuver, externally rotating then extending the leg in order to get it locked out of flexion  May require medication to relax  If unsuccessful, patient does not necessarily need to be admitted although he should follow up with his surgeon to consider moving up surgery if able  18 Successfully got patients leg straight however he had a considerable amount of pain with doing so   Patient initially declined oxycodone however is now requesting dose of something stronger due to worsened pain from straightening knee MDM  Number of Diagnoses or Management Options  Left knee pain  Diagnosis management comments: Patient presented with left knee pain in the setting of now on left medial meniscus tear  Had an MRI few days ago  No trauma sense  No significant bony tenderness  Doubt x-ray would   Discussed case with orthopedics as seen in ED course  They recommend attempting a Gila's technique to straight leg  Technique was successful however patient reported more comfort in knee flexion and was able to still get his knee immobilizer on in knee flexion  Placed Ace wrap  Patient's pain well controlled in the ED  No significant effusion  Able to passively range the joint although somewhat painful  Patient is afebrile, nontoxic  Doubt septic arthritis  Suspect source of pain due to known meniscal tear  Patient prefers to avoid NSAIDs due to known cerebral aneurysm  I do not see any imaging of this aneurysm in the chart however patient reports that it was small  I estimate the benefits of IM Toradol 1 dose only to be greater than any risk  Will treat topically with Voltaren along with Tylenol and ice  PDMP reviewed  Will send small course of Norco for severe pain given patient's significant level discomfort in the ED  Recommend the patient call his orthopedic surgeon tomorrow morning  Return precautions to the ED were provided  All the patient's questions were answered he was agreeable to plan  Disposition  Final diagnoses:   Left knee pain     Time reflects when diagnosis was documented in both MDM as applicable and the Disposition within this note     Time User Action Codes Description Comment    11/6/2022 11:01 PM Roberta Catalan Add [I24 [id number]] Left knee pain       ED Disposition     ED Disposition   Discharge    Condition   Stable    Date/Time   Sun Nov 6, 2022 11:01 PM    Patrici[place]omal discharge to home/self care  Follow-up Information    None         Discharge Medication List as of 11/6/2022 11:32 PM      START taking these medications    Details   Diclofenac Sodium (VOLTAREN) 1 % Apply 2 g topically 4 (four) times a day for 7 days, Starting Sun 11/6/2022, Until Sun 11/13/2022, Normal      HYDROcodone-acetaminophen (Norco) 5-325 mg per tablet Take 1 tablet by mouth every 6 (six) hours as needed for pain for up to 6 doses Max Daily Amount: 4 tablets, Starting Sun 11/6/2022, Normal         CONTINUE these medications which have NOT CHANGED    Details   albuterol (Ventolin HFA) 90 mcg/act inhaler Inhale 2 puffs every 6 (six) hours as needed for wheezing, Starting Wed 12/8/2021, Normal             No discharge procedures on file      PDMP Review     None          ED Provider  Electronically Signed by           Abner Lee PA-C  11/07/22 0011

## 2022-11-07 NOTE — TELEPHONE ENCOUNTER
Caller: Patient    Doctor: Chayo Peterson    Reason for call: Patient called to advise he was in the hospital 11/6  L knee locked/couldn't straighten  Patient asked if surgery could be moved up?     Call back#: [phone]

## 2022-11-07 NOTE — ED NOTES
Pt has knee bent stating it is too painful to keep it straight        Silvio Norton RN  11/06/22 1223

## 2022-11-08 ENCOUNTER — ANESTHESIA EVENT (OUTPATIENT)
Dept: PERIOP | Facility: HOSPITAL | Age: 42
End: 2022-11-08

## 2022-11-08 ENCOUNTER — HOSPITAL ENCOUNTER (OUTPATIENT)
Facility: HOSPITAL | Age: 42
Setting detail: OUTPATIENT SURGERY
Discharge: HOME | End: 2022-11-08
Attending: ORTHOPAEDIC SURGERY | Admitting: ORTHOPAEDIC SURGERY

## 2022-11-08 ENCOUNTER — ANESTHESIA (OUTPATIENT)
Dept: PERIOP | Facility: HOSPITAL | Age: 42
End: 2022-11-08

## 2022-11-08 VITALS
WEIGHT: 157 LBS | OXYGEN SATURATION: 98 % | HEIGHT: 68 IN | BODY MASS INDEX: 23.79 KG/M2 | RESPIRATION RATE: 16 BRPM | TEMPERATURE: 98.2 F | DIASTOLIC BLOOD PRESSURE: 90 MMHG | SYSTOLIC BLOOD PRESSURE: 139 MMHG | HEART RATE: 70 BPM

## 2022-11-08 DIAGNOSIS — S83.212D BUCKET-HANDLE TEAR OF MEDIAL MENISCUS OF LEFT KNEE AS CURRENT INJURY, SUBSEQUENT ENCOUNTER: Primary | ICD-10-CM

## 2022-11-08 DEVICE — IMPLANTABLE DEVICE
Type: IMPLANTABLE DEVICE | Site: KNEE | Status: FUNCTIONAL
Brand: FIBERSTITCH™ IMPLANT, CURVED WITH TWO POLYESTER IMPLANTS AND 2-0 FIBERWIRE® SUTU

## 2022-11-08 DEVICE — IMPLANTABLE DEVICE
Type: IMPLANTABLE DEVICE | Site: KNEE | Status: FUNCTIONAL
Brand: FIBERSTITCH™ IMPLANT, 24° CURVE WITH TWO POLYESTER IMPLANTS AND 2-0 FIBERWIRE® S

## 2022-11-08 RX ORDER — BUPIVACAINE HYDROCHLORIDE 5 MG/ML
INJECTION, SOLUTION PERINEURAL
Status: DISCONTINUED | OUTPATIENT
Start: 2022-11-08 | End: 2022-11-08

## 2022-11-08 RX ORDER — ONDANSETRON 4 MG/1
4 TABLET, FILM COATED ORAL EVERY 8 HOURS PRN
Qty: 20 TABLET | Refills: 0 | Status: SHIPPED | OUTPATIENT
Start: 2022-11-08

## 2022-11-08 RX ORDER — BUPIVACAINE HYDROCHLORIDE 5 MG/ML
INJECTION, SOLUTION EPIDURAL; INTRACAUDAL AS NEEDED
Status: DISCONTINUED | OUTPATIENT
Start: 2022-11-08 | End: 2022-11-08 | Stop reason: HOSPADM

## 2022-11-08 RX ORDER — LIDOCAINE HYDROCHLORIDE 20 MG/ML
INJECTION, SOLUTION EPIDURAL; INFILTRATION; INTRACAUDAL; PERINEURAL AS NEEDED
Status: DISCONTINUED | OUTPATIENT
Start: 2022-11-08 | End: 2022-11-08

## 2022-11-08 RX ORDER — OXYCODONE HYDROCHLORIDE AND ACETAMINOPHEN 5; 325 MG/1; MG/1
2 TABLET ORAL ONCE
Status: COMPLETED | OUTPATIENT
Start: 2022-11-08 | End: 2022-11-08

## 2022-11-08 RX ORDER — KETOROLAC TROMETHAMINE 30 MG/ML
INJECTION, SOLUTION INTRAMUSCULAR; INTRAVENOUS
Status: COMPLETED
Start: 2022-11-08 | End: 2022-11-08

## 2022-11-08 RX ORDER — HYDROMORPHONE HCL/PF 1 MG/ML
0.5 SYRINGE (ML) INJECTION
Status: COMPLETED | OUTPATIENT
Start: 2022-11-08 | End: 2022-11-08

## 2022-11-08 RX ORDER — FENTANYL CITRATE/PF 50 MCG/ML
50 SYRINGE (ML) INJECTION
Status: DISCONTINUED | OUTPATIENT
Start: 2022-11-08 | End: 2022-11-08 | Stop reason: HOSPADM

## 2022-11-08 RX ORDER — ONDANSETRON 2 MG/ML
INJECTION INTRAMUSCULAR; INTRAVENOUS AS NEEDED
Status: DISCONTINUED | OUTPATIENT
Start: 2022-11-08 | End: 2022-11-08

## 2022-11-08 RX ORDER — ACETAMINOPHEN 325 MG/1
975 TABLET ORAL ONCE
Status: DISCONTINUED | OUTPATIENT
Start: 2022-11-08 | End: 2022-11-08 | Stop reason: HOSPADM

## 2022-11-08 RX ORDER — ACETAMINOPHEN 500 MG
500 TABLET ORAL EVERY 6 HOURS
Qty: 30 TABLET | Refills: 1 | Status: SHIPPED | OUTPATIENT
Start: 2022-11-08

## 2022-11-08 RX ORDER — DEXAMETHASONE SODIUM PHOSPHATE 10 MG/ML
INJECTION, SOLUTION INTRAMUSCULAR; INTRAVENOUS AS NEEDED
Status: DISCONTINUED | OUTPATIENT
Start: 2022-11-08 | End: 2022-11-08

## 2022-11-08 RX ORDER — FENTANYL CITRATE 50 UG/ML
INJECTION, SOLUTION INTRAMUSCULAR; INTRAVENOUS AS NEEDED
Status: DISCONTINUED | OUTPATIENT
Start: 2022-11-08 | End: 2022-11-08

## 2022-11-08 RX ORDER — KETOROLAC TROMETHAMINE 30 MG/ML
INJECTION, SOLUTION INTRAMUSCULAR; INTRAVENOUS AS NEEDED
Status: DISCONTINUED | OUTPATIENT
Start: 2022-11-08 | End: 2022-11-08

## 2022-11-08 RX ORDER — OXYCODONE HYDROCHLORIDE 5 MG/1
5 TABLET ORAL ONCE
Status: COMPLETED | OUTPATIENT
Start: 2022-11-08 | End: 2022-11-08

## 2022-11-08 RX ORDER — SODIUM CHLORIDE, SODIUM LACTATE, POTASSIUM CHLORIDE, CALCIUM CHLORIDE 600; 310; 30; 20 MG/100ML; MG/100ML; MG/100ML; MG/100ML
100 INJECTION, SOLUTION INTRAVENOUS CONTINUOUS
Status: DISCONTINUED | OUTPATIENT
Start: 2022-11-08 | End: 2022-11-08 | Stop reason: HOSPADM

## 2022-11-08 RX ORDER — MAGNESIUM HYDROXIDE 1200 MG/15ML
LIQUID ORAL AS NEEDED
Status: DISCONTINUED | OUTPATIENT
Start: 2022-11-08 | End: 2022-11-08 | Stop reason: HOSPADM

## 2022-11-08 RX ORDER — PREGABALIN 150 MG/1
150 CAPSULE ORAL 3 TIMES DAILY
COMMUNITY

## 2022-11-08 RX ORDER — ACETAMINOPHEN 500 MG
1000 TABLET ORAL EVERY 6 HOURS PRN
COMMUNITY
End: 2022-11-08

## 2022-11-08 RX ORDER — CEFAZOLIN SODIUM 2 G/50ML
SOLUTION INTRAVENOUS
Status: COMPLETED
Start: 2022-11-08 | End: 2022-11-08

## 2022-11-08 RX ORDER — CEFAZOLIN SODIUM 2 G/50ML
2000 SOLUTION INTRAVENOUS ONCE
Status: COMPLETED | OUTPATIENT
Start: 2022-11-08 | End: 2022-11-08

## 2022-11-08 RX ORDER — PROPOFOL 10 MG/ML
INJECTION, EMULSION INTRAVENOUS AS NEEDED
Status: DISCONTINUED | OUTPATIENT
Start: 2022-11-08 | End: 2022-11-08

## 2022-11-08 RX ORDER — OXYCODONE HYDROCHLORIDE 5 MG/1
TABLET ORAL
Status: DISCONTINUED
Start: 2022-11-08 | End: 2022-11-08 | Stop reason: HOSPADM

## 2022-11-08 RX ORDER — CELECOXIB 200 MG/1
200 CAPSULE ORAL 2 TIMES DAILY
Qty: 14 CAPSULE | Refills: 1 | OUTPATIENT
Start: 2022-11-08 | End: 2022-11-20

## 2022-11-08 RX ORDER — KETOROLAC TROMETHAMINE 30 MG/ML
15 INJECTION, SOLUTION INTRAMUSCULAR; INTRAVENOUS ONCE
Status: COMPLETED | OUTPATIENT
Start: 2022-11-08 | End: 2022-11-08

## 2022-11-08 RX ORDER — ACETAMINOPHEN 325 MG/1
TABLET ORAL
Status: COMPLETED
Start: 2022-11-08 | End: 2022-11-08

## 2022-11-08 RX ORDER — MIDAZOLAM HYDROCHLORIDE 2 MG/2ML
INJECTION, SOLUTION INTRAMUSCULAR; INTRAVENOUS AS NEEDED
Status: DISCONTINUED | OUTPATIENT
Start: 2022-11-08 | End: 2022-11-08

## 2022-11-08 RX ORDER — OXYCODONE HYDROCHLORIDE 5 MG/1
5 TABLET ORAL EVERY 4 HOURS PRN
Qty: 25 TABLET | Refills: 0 | Status: SHIPPED | OUTPATIENT
Start: 2022-11-08 | End: 2022-11-18

## 2022-11-08 RX ADMIN — CEFAZOLIN SODIUM 2000 MG: 2 SOLUTION INTRAVENOUS at 16:20

## 2022-11-08 RX ADMIN — KETOROLAC TROMETHAMINE 30 MG: 30 INJECTION, SOLUTION INTRAMUSCULAR at 17:08

## 2022-11-08 RX ADMIN — KETOROLAC TROMETHAMINE 15 MG: 30 INJECTION, SOLUTION INTRAMUSCULAR at 14:28

## 2022-11-08 RX ADMIN — LIDOCAINE HYDROCHLORIDE 20 MG: 20 INJECTION, SOLUTION EPIDURAL; INFILTRATION; INTRACAUDAL; PERINEURAL at 16:31

## 2022-11-08 RX ADMIN — OXYCODONE HYDROCHLORIDE 5 MG: 5 TABLET ORAL at 13:03

## 2022-11-08 RX ADMIN — HYDROMORPHONE HYDROCHLORIDE 0.5 MG: 1 INJECTION, SOLUTION INTRAMUSCULAR; INTRAVENOUS; SUBCUTANEOUS at 18:25

## 2022-11-08 RX ADMIN — PROPOFOL 200 MG: 10 INJECTION, EMULSION INTRAVENOUS at 16:31

## 2022-11-08 RX ADMIN — HYDROMORPHONE HYDROCHLORIDE 0.5 MG: 1 INJECTION, SOLUTION INTRAMUSCULAR; INTRAVENOUS; SUBCUTANEOUS at 18:19

## 2022-11-08 RX ADMIN — BUPIVACAINE HYDROCHLORIDE 20 ML: 5 INJECTION, SOLUTION PERINEURAL at 18:25

## 2022-11-08 RX ADMIN — FENTANYL CITRATE 100 MCG: 50 INJECTION INTRAMUSCULAR; INTRAVENOUS at 16:28

## 2022-11-08 RX ADMIN — MIDAZOLAM 2 MG: 1 INJECTION INTRAMUSCULAR; INTRAVENOUS at 16:28

## 2022-11-08 RX ADMIN — FENTANYL CITRATE 50 MCG: 50 INJECTION INTRAMUSCULAR; INTRAVENOUS at 17:10

## 2022-11-08 RX ADMIN — SODIUM CHLORIDE, SODIUM LACTATE, POTASSIUM CHLORIDE, AND CALCIUM CHLORIDE 100 ML/HR: .6; .31; .03; .02 INJECTION, SOLUTION INTRAVENOUS at 12:28

## 2022-11-08 RX ADMIN — FENTANYL CITRATE 50 MCG: 50 INJECTION INTRAMUSCULAR; INTRAVENOUS at 18:14

## 2022-11-08 RX ADMIN — PROPOFOL 50 MG: 10 INJECTION, EMULSION INTRAVENOUS at 16:46

## 2022-11-08 RX ADMIN — OXYCODONE HYDROCHLORIDE AND ACETAMINOPHEN 2 TABLET: 5; 325 TABLET ORAL at 18:49

## 2022-11-08 RX ADMIN — ACETAMINOPHEN 975 MG: 325 TABLET ORAL at 13:03

## 2022-11-08 RX ADMIN — ONDANSETRON 4 MG: 2 INJECTION INTRAMUSCULAR; INTRAVENOUS at 16:31

## 2022-11-08 RX ADMIN — DEXAMETHASONE SODIUM PHOSPHATE 10 MG: 10 INJECTION INTRAMUSCULAR; INTRAVENOUS at 16:31

## 2022-11-08 RX ADMIN — FENTANYL CITRATE 50 MCG: 50 INJECTION INTRAMUSCULAR; INTRAVENOUS at 17:16

## 2022-11-08 NOTE — ANESTHESIA PROCEDURE NOTES
Peripheral Block    Patient location during procedure: post-op  Start time: 11/8/2022 6:25 PM  Reason for block: at surgeon's request and post-op pain management  Staffing  Performed: Anesthesiologist   Anesthesiologist: Verlean Hatchet, DO  Preanesthetic Checklist  Completed: patient identified, IV checked, site marked, risks and benefits discussed, surgical consent, monitors and equipment checked, pre-op evaluation and timeout performed  Peripheral Block  Patient position: supine  Prep: ChloraPrep  Patient monitoring: continuous pulse ox and frequent blood pressure checks  Block type: adductor canal block  Laterality: left  Procedures: ultrasound guided, Ultrasound guidance required for the procedure to increase accuracy and safety of medication placement and decrease risk of complications  bupivacaine (MARCAINE) 0 5 % - Perineural   20 mL - 11/8/2022 6:25:00 PM  Needle  Test dose: negative  Assessment  Injection assessment: incremental injection and local visualized surrounding nerve on ultrasound  Paresthesia pain: none  Post-procedure:  site cleaned  patient tolerated the procedure well with no immediate complications

## 2022-11-08 NOTE — ANESTHESIA POSTPROCEDURE EVALUATION
Post-Op Assessment Note    CV Status:  Stable  Pain Score: 0    Pain management: adequate     Mental Status:  Alert   Hydration Status:  Stable   PONV Controlled:  Controlled   Airway Patency:  Patent      Post Op Vitals Reviewed: Yes      Staff: CRNA         No complications documented      BP   103/61   Temp   97 9   Pulse  63   Resp   20   SpO2   100

## 2022-11-08 NOTE — DISCHARGE INSTRUCTIONS
Postoperative Instructions Following Meniscus Repair     MEDICATIONS:  Resume all home medications unless otherwise instructed by your surgeon  Pain Medication:  Oxycodone 5 mg, 1-2 tablets every 4-6 hours as needed  Take Tylenol and Celebrex on a scheduled basis for the first 7 days   If you were given a regional anesthetic (nerve block), it is helpful to take your pain medication before the block wear off  Possible side effects include nausea, constipation, and urinary retention  If you experience these side effects, please call our office for assistance  Pain med refills are authorized only during office hours (8am-4pm, Mon-Fri)  Nausea Medication:  Zofran ODT 4 mg, 1 tablet every 6 hours as needed  Fill prescription ONLY if you expericnce severe nausea  Blood Clot Prevention:  Not Necessary  Pump your foot up and down 20 times per hour while you are less mobile  Move around using your crutches as often as possible     WOUND CARE:  Keep the dressing clean and dry  Light drainage may occur the first 3 days postop  You may remove the dressings and get the incision wet in the shower 72 hours after surgery  DO NOT remove steri-strips or sutures  DO NOT immerse the incision under water  Carefully pat the incision dry  If there is wound drainage, re-apply a fresh dry gauze dressing  Please call our office ([phone]) if you experience either of the following:  Sudden increase in swelling, redness, or warmth at the surgical site  Excessive incisional drainage that persists beyond the 3rd day after surgery  Oral temperature greater than 101 degrees, not relieved with Tylenol  Shortness of breath, chest pain, nausea, or any other concerning symptoms    Other pain/swelling control measures:  Cold Therapy:  Apply ice (20 min on, 20 min off) as often as you feel is necessary  Ice helps with pain  Elevation:  Elevate the entire leg above heart level    Place pillows under your ankle to keep your knee straight  This will help with swelling and prevents stiffness   Compression:  Keep an ace wrap on the operative leg until you return to the office  It may be removed to shower as described above  RANGE OF MOTION:  You are allowed LIMITED RANGE OF MOTION from 0 degrees (full extension) to 90 degrees of flexion    IMMOBILIZATION:  Wear your knee brace at all times except when you take a shower  You may unlock the knee brace from 0-90 degrees for Range of motion exercises every day  Keep you brace locked in extension when walking and while sleeping  ACTIVITY:   Toe touch weight bearing  only   Using Crutches on Stairs:  Going up, lead with your "good" (nonoperative) leg  Going down, lead with your "bad" (operative) leg  Use a hand rail when available  Knee Extension:  Place a rolled towel or pillow under your ankle for 20-30 minutes 3-5 times per day  This will help to maintain full knee extension  Quad Sets:  Sit or lie with your knee straight  Tighten your quadriceps (front thigh) muscle  Hold for 3 seconds, then relax  Repeat 20 times per hour while awake      PHYSICAL THERAPY:  Begin physical therapy within 3 days of surgery    FOLLOW-UP APPOINTMENT:  10-14 days after surgery with:    Dr Era Carmona MD    [address]  [address] [address]  [place], [place], [address] [place] [address], [facility]    Phone:   [phone]

## 2022-11-08 NOTE — H&P
H&P reviewed  After examining the patient I find no changes in the patients condition since the H&P had been written  Vitals:    11/08/22 1220   BP: 126/61   Pulse: 80   Resp: 20   Temp: 98 2 °F (36 8 °C)   SpO2: 99%     Ortho Sports Medicine New Patient Visit     Assesment:   43 y o  male with left medial meniscus tear    Plan: We had a long discussion regarding treatment options  We discussed operative versus non operative treatment options  Given his extreme level pain, inability to fully extend the knee, the patient opted to proceed with surgical intervention  I agreed and recommended a left knee arthroscopy with possible medial meniscus repair versus partial medial meniscectomy  We discussed the risks benefits and alternatives  We discussed that his best chance of having a normal knee would be if we are able to repair the meniscus  However I will note this is possible until we are inside the knee  I did also discuss that if I do repair the meniscus there is a chance that the repair could fail he may need a subsequent surgery for meniscectomy  He expressed understanding and elected to proceed  This will be scheduled in the near future  Placed a referral to physical therapy which I recommend starting within 3 days after surgery  Knee brace provided today  Follow-up 10-14 days after surgery  No chief complaint on file  History of Present Illness: The patient is a 43 y o  male was previously seen by Christie Garcia and sent for an MRI which is available today for review  He continues to have severe pain in the knee and inability to fully extend the knee  Pain is all medially based        Knee Surgical History:  None    Past Medical, Social and Family History:  Past Medical History:   Diagnosis Date   • Anxiety    • Asthma    • Brain aneurysm    • Chronic pain     back   • Depression      Past Surgical History:   Procedure Laterality Date   • BACK SURGERY     • LUMBAR DISCECTOMY  2015    & fusion; L5-S1     Allergies   Allergen Reactions   • Shellfish-Derived Products - Food Allergy Anaphylaxis     No current facility-administered medications on file prior to encounter  Current Outpatient Medications on File Prior to Encounter   Medication Sig Dispense Refill   • acetaminophen (TYLENOL) 500 mg tablet Take 1,000 mg by mouth every 6 (six) hours as needed for mild pain     • pregabalin (LYRICA) 150 mg capsule Take 150 mg by mouth 3 (three) times a day     • albuterol (Ventolin HFA) 90 mcg/act inhaler Inhale 2 puffs every 6 (six) hours as needed for wheezing 18 g 5     Social History     Socioeconomic History   • Marital status: /Civil Union     Spouse name: Not on file   • Number of children: Not on file   • Years of education: Not on file   • Highest education level: Not on file   Occupational History   • Not on file   Tobacco Use   • Smoking status: Current Every Day Smoker     Packs/day: 0 25     Types: Cigarettes   • Smokeless tobacco: Never Used   Vaping Use   • Vaping Use: Never used   Substance and Sexual Activity   • Alcohol use: No   • Drug use: Not Currently     Types: Marijuana     Comment: patient had medical marijuana    • Sexual activity: Not on file   Other Topics Concern   • Not on file   Social History Narrative    fhx not reviewed intriage     Social Determinants of Health     Financial Resource Strain: Not on file   Food Insecurity: Not on file   Transportation Needs: Not on file   Physical Activity: Not on file   Stress: Not on file   Social Connections: Not on file   Intimate Partner Violence: Not on file   Housing Stability: Not on file         I have reviewed the past medical, surgical, social and family history, medications and allergies as documented in the EMR  Review of systems: ROS is negative other than that noted in the HPI  Constitutional: Negative for fatigue and fever  HENT: Negative for sore throat      Respiratory: Negative for shortness of breath  Cardiovascular: Negative for chest pain  Gastrointestinal: Negative for abdominal pain  Endocrine: Negative for cold intolerance and heat intolerance  Genitourinary: Negative for flank pain  Musculoskeletal: Negative for back pain  Skin: Negative for rash  Allergic/Immunologic: Negative for immunocompromised state  Neurological: Negative for dizziness  Psychiatric/Behavioral: Negative for agitation  Physical Exam:    Blood pressure 126/61, pulse 80, temperature 98 2 °F (36 8 °C), temperature source Temporal, resp  rate 20, height 5' 8" (1 727 m), weight 71 2 kg (157 lb), SpO2 99 %  General/Constitutional: NAD, well developed, well nourished  HENT: Normocephalic, atraumatic  CV: Intact distal pulses, regular rate  Resp: No respiratory distress or labored breathing  Lymphatic: No lymphadenopathy palpated  Neuro: Alert and Oriented x 3, no focal deficits  Psych: Normal mood, normal affect  Skin: Warm, dry, no rashes, no erythema      Knee Exam:   Moderate effusion  No skin wounds  Knee range of motion from roughly 5° short of full extension to 90 of flexion with significant pain throughout  Knee is stable to varus stress, valgus stress, Lachman and posterior drawer  Medial joint line tenderness  Neurovascular intact distally  Knee Imaging    MRI of the knee reviewed showing a large tear of the medial meniscus  There does appear to be a large horizontal cleavage component as well as radial component

## 2022-11-08 NOTE — ANESTHESIA PREPROCEDURE EVALUATION
Procedure:  Left knee arthroscopy, medial meniscus repair, possible medial meniscectomy (Left Knee)    Relevant Problems   MUSCULOSKELETAL   (+) Bilateral low back pain with sciatica   (+) Chronic back pain greater than 3 months duration   (+) DDD (degenerative disc disease), cervical   (+) DDD (degenerative disc disease), lumbosacral   (+) Lumbosacral spondylosis      NEURO/PSYCH   (+) Chronic back pain greater than 3 months duration      PULMONARY   (+) Asthma with acute exacerbation   (+) Chronic obstructive pulmonary disease (HCC)   (+) Mild intermittent asthma without complication        Physical Exam    Airway    Mallampati score: III  TM Distance: >3 FB  Neck ROM: full     Dental   No notable dental hx     Cardiovascular  Cardiovascular exam normal    Pulmonary  Pulmonary exam normal     Other Findings     Pt in significant pain in pre op  Reports asthma is mild, seasonal  Takes lyrica for chronic back pain    Anesthesia Plan  ASA Score- 2     Anesthesia Type- general with ASA Monitors  Additional Monitors:   Airway Plan: LMA  Plan Factors-Exercise tolerance (METS): >4 METS  Chart reviewed  EKG reviewed  Existing labs reviewed  Patient summary reviewed  Patient is not a current smoker  Induction- intravenous  Postoperative Plan- Plan for postoperative opioid use  Informed Consent- Anesthetic plan and risks discussed with patient  I personally reviewed this patient with the CRNA  Discussed and agreed on the Anesthesia Plan with the CRNA  Joe Hayes

## 2022-11-09 NOTE — OP NOTE
OPERATIVE REPORT  PATIENT NAME: Ricci Calvert    :  1980  MRN: [id number]  Pt Location: [facility] ROOM 01    SURGERY DATE: 2022    Surgeon(s) and Role:     * Skyler Marx MD - Primary     * Belinda Mccall [facility] - Assisting    Preop Diagnosis:  Complex tear of medial meniscus of left knee as current injury, subsequent encounter [S83 232D]    Post-Op Diagnosis Codes:     * Complex tear of medial meniscus of left knee as current injury, subsequent encounter [S83 232D]    Procedure: Left knee arthroscopy, medial meniscus repair    Specimen(s):  * No specimens in log *    Estimated Blood Loss:   Minimal    Drains:  * No LDAs found *    Anesthesia Type:   General w/ Regional    Indications for surgery:   Patient is a 44-year-old male who presented with acute pain in his left knee after an injury at work  He was found to have a large effusion, medial joint line tenderness, and limited range of motion so he sent for an MRI  The MRI revealed a bucket-handle tear of the medial meniscus  Risks benefits and alternatives to surgery were discussed with the patient  Plan surgery was a left knee arthroscopy with possible medial meniscus repair versus partial medial meniscectomy  He opted to proceed with surgery and was scheduled for a date in the near future  However he called yesterday and had an acutely locked knee after the meniscal fragment most likely displaced  Surgical date was rescheduled for today to limit his risk of permanent stiffness or further damage to the meniscal tissue  Findings:      Arthroscopic evaluation of the left knee revealed the following:     Medial meniscus: Bucket handle tear of the medial meniscus with bleeding present along the peripheral margin   Medial femoral condyle:Normal   Medial tibial plateau:Normal   Anterior cruciate ligament: Normal appearance  Posterior cruciate ligament: Normal appearance     Lateral meniscus: normal   Lateral femoral condyle: normal Lateral tibial plateau: normal   Medial and lateral gutters: No loose bodies  Patella: normal  Trochlea: central Grade 1 changes   Medial plica: No significant plica was present            Procedure: In the pre-operative holding area, the patient identified the correct operative extremity and I marked that extremity with my initials, using a permanent marker  The patient was then brought to the operating room and positioned supine  Following satisfactory induction of anesthesia, the left knee was prepped and draped in the usual sterile fashion for surgical arthroscopy of the left knee  Before any surgical instrumentation was passed to me by the surgical technician, a formalized time-out occurred, which involves the surgeon, circulating nurse, and anesthesia staff all verifying the correct operative extremity  My initials were visible on the prepped and draped operative field  The anatomic landmarks of the anteromedial and anterolateral portals were marked and these portal sites were injected with local anesthetic   The anterolateral portal was established with a scalpel  The arthroscope was introduced through this portal  Under direct visualization, the anteromedial portal was established with a localizing needle followed by a scalpel  A probe was then introduced into the anteromedial portal  A systematic diagnostic arthroscopy evaluated the following:  medial compartment, notch, lateral compartment, patellofemoral compartment, medial gutter, and lateral gutter  The medial meniscus was found to have a bucket hand tear and was displaced into the femoral notch  The bucket handle portion was easily reduced using a probe and remained reduced  The tissue was assessed and found to be of good quality  In order to improve visualization and allow meniscal repair with less risk of damage to the pristine chondral surfaces, a percutaneous MCL release was performed    An 18 gauge needle was inserted just distal to the femoral origin of the MCL  The needle was passed through the [facility] in several different locations while visualizing the medial compartment through the arthroscope  The medial compartment opened adequately and visualization was improved  Attention was then again turned to the meniscus tear  With improved visualization decision was made to proceed with repair  The peripheral fragment demonstrated satisfactory bleeding following gentle abrasion and trephination  The adjacent synovium was abraded as well to facilitate healing  The medial meniscus was repaired using 8 all-inside (Arhtrex FiberStitch) meniscus repair devices (CPT 23221)  Devices were placed on the superior and inferior margins to provide circumferential compression and stability The meniscus was probed following repair to ensure stability  Finally, to improve that likelihood that the meniscus would heal, marrow element venting holes were drilled into the extraarticular portion of the lateral aspect of the femoral notch  4 holes in total were drilled  Bleeding was visualized from the holes after drilling completed  The articular cartilage was viewed after each hole was drilled to ensure that the cartilage was not penetrated  There was no additional pathology  All particulate debris was removed  The knee was copiously rinsed and then drained  The portals were closed with an interrupted 4-0 nylon suture  The skin was cleansed with sterile saline and dried  Finally, a sterile dressing was secured by Webril and an Ace wrap, followed by a hinged knee brace locked in extension  I was present for the entire procedure  NOTE:  The presence of a physician assistant was necessary to help with patient positioning, surgical exposure, wound retraction, wound closure, and other key portions of the procedure  No qualified resident was available for this case              SIGNATURE: Nick Sorenson MD  DATE: November 8, 2022  TIME: 9:40 PM

## 2022-11-10 ENCOUNTER — TELEPHONE (OUTPATIENT)
Dept: OBGYN CLINIC | Facility: HOSPITAL | Age: 42
End: 2022-11-10

## 2022-11-10 NOTE — TELEPHONE ENCOUNTER
Caller: Patient    Doctor: Jeremy Crump    Reason for call: Patient having pain after sx 2 days ago  Icing and medication with no relief  Pain 9-10/10  Doesn't want to go to the ED  Wants to talk to clinical staff first   He mentioned stabbing pain in the knee      Call back#: [phone]

## 2022-11-11 ENCOUNTER — TELEPHONE (OUTPATIENT)
Dept: OBGYN CLINIC | Facility: HOSPITAL | Age: 42
End: 2022-11-11

## 2022-11-11 ENCOUNTER — OFFICE VISIT (OUTPATIENT)
Dept: OBGYN CLINIC | Facility: CLINIC | Age: 42
End: 2022-11-11

## 2022-11-11 VITALS
HEART RATE: 105 BPM | SYSTOLIC BLOOD PRESSURE: 142 MMHG | DIASTOLIC BLOOD PRESSURE: 89 MMHG | WEIGHT: 157 LBS | HEIGHT: 68 IN | BODY MASS INDEX: 23.79 KG/M2

## 2022-11-11 DIAGNOSIS — S83.232D COMPLEX TEAR OF MEDIAL MENISCUS OF LEFT KNEE AS CURRENT INJURY, SUBSEQUENT ENCOUNTER: Primary | ICD-10-CM

## 2022-11-11 RX ORDER — OXYCODONE HYDROCHLORIDE 5 MG/1
5 TABLET ORAL EVERY 4 HOURS PRN
Qty: 25 TABLET | Refills: 0 | Status: SHIPPED | OUTPATIENT
Start: 2022-11-11 | End: 2022-11-21 | Stop reason: SDUPTHER

## 2022-11-11 RX ORDER — NALOXONE HYDROCHLORIDE 4 MG/.1ML
SPRAY NASAL
Qty: 1 EACH | Refills: 1 | Status: SHIPPED | OUTPATIENT
Start: 2022-11-11

## 2022-11-11 NOTE — TELEPHONE ENCOUNTER
Caller: Self    Doctor: Chayo Peterson    Reason for call: Patient went to wrong location for his appt today  He stated that he will be arriving by 2pm today for his appt  He would like to speak to the nurse in the meantime to discuss his pain      Call back# [phone]

## 2022-11-14 NOTE — PROGRESS NOTES
Assessment:   S/P:  Left knee arthroscopy with medial meniscus all-inside repair    Plan:   I would a long discussion with Radha Wright regarding his postoperative pain  I am encouraged by his exam as it does not show any sign of infection or acute complication  I explained that we did do a large repair that required a partial release of his MCL is well and the pain is expected part of the surgery  I also explained that his previous use of opioid pain medications in the past does change his tolerance to these medications in the future as well as his body's response to pain  I recommended that he continue taking scheduled Celebrex and Tylenol with narcotic as needed only  A small refill was provided and we discussed that I do not plan on providing further refills for this  He agreed with this as he is not interested on becoming dependent on the narcotic medications again  I recommended that he start physical therapy early next week per provided protocol  He will continue his brace and crutches as well  CHIEF COMPLAINT:  Chief Complaint   Patient presents with   • Left Knee - Follow-up         SUBJECTIVE:  Bethel Fulton is a 43y o  year old male who presents for follow up after left knee arthroscopy with medial meniscus repair, all-inside  Returns 3 days after surgery due to significant pain  Has been following weight-bearing limitations and wearing his brace  Has not noticed any drainage from the wound  No fevers or chills  No further injuries since the surgery  PHYSICAL EXAMINATION:  General: well developed and well nourished, alert, oriented times 3 and appears comfortable  Psychiatric: Normal    MUSCULOSKELETAL EXAMINATION:  Incision: Clean, dry, intact  Range of Motion:  Limited motion due to pain  Demonstrates full extension to roughly flexion    Neurovascularly intact

## 2022-11-17 ENCOUNTER — TELEPHONE (OUTPATIENT)
Dept: OBGYN CLINIC | Facility: HOSPITAL | Age: 42
End: 2022-11-17

## 2022-11-17 NOTE — TELEPHONE ENCOUNTER
Caller: edi    Doctor: Doris Mckenzie    Reason for call: patient has pt appt far out would like to know what he can do at home to start movement     Also, would like to know which medication would be used as an anti inflammatory requesting calling back     Call back#: [phone]

## 2022-11-18 NOTE — TELEPHONE ENCOUNTER
Spoke to patient and information above relayed  He states he is coming out of the brace for ADL's a few x daily and he is doing exercises out of the brace  Feels brace is heavy and is pulling his groin when he exercises  Please advise    He is requesting refill of oxycodone for Monday  Also wondering if his lyrica could be refilled?

## 2022-11-20 ENCOUNTER — HOSPITAL ENCOUNTER (EMERGENCY)
Facility: HOSPITAL | Age: 42
Discharge: HOME | End: 2022-11-20
Attending: EMERGENCY MEDICINE

## 2022-11-20 VITALS
TEMPERATURE: 98.4 F | DIASTOLIC BLOOD PRESSURE: 82 MMHG | BODY MASS INDEX: 22.09 KG/M2 | RESPIRATION RATE: 17 BRPM | HEART RATE: 71 BPM | SYSTOLIC BLOOD PRESSURE: 130 MMHG | WEIGHT: 145.28 LBS | OXYGEN SATURATION: 100 %

## 2022-11-20 DIAGNOSIS — M25.561 RIGHT KNEE PAIN: Primary | ICD-10-CM

## 2022-11-20 RX ORDER — IBUPROFEN 800 MG/1
800 TABLET ORAL EVERY 6 HOURS PRN
Qty: 30 TABLET | Refills: 0 | Status: SHIPPED | OUTPATIENT
Start: 2022-11-20

## 2022-11-20 RX ORDER — KETOROLAC TROMETHAMINE 30 MG/ML
60 INJECTION, SOLUTION INTRAMUSCULAR; INTRAVENOUS ONCE
Status: COMPLETED | OUTPATIENT
Start: 2022-11-20 | End: 2022-11-20

## 2022-11-20 RX ADMIN — KETOROLAC TROMETHAMINE 60 MG: 30 INJECTION, SOLUTION INTRAMUSCULAR; INTRAVENOUS at 08:31

## 2022-11-20 NOTE — ED PROVIDER NOTES
History  Chief Complaint   Patient presents with   • Knee Pain     Patient reporting left meniscus surgery on 11/8  States increasing pain in knee with no relief from oxycodone  Patient is a 51-year-old male  Patient has a history of a meniscal injury to his left knee  It is work related  He had surgery 12 days ago  He is complaining of 1 day of increased pain to the knee  It is a sharp stabbing pain  There is no new trauma  No fever or chills  No calf pain or lower leg swelling  No associated motor or sensory complaints  Patient reports no relief with Percocet  Prior to Admission Medications   Prescriptions Last Dose Informant Patient Reported? Taking?   acetaminophen (TYLENOL) 500 mg tablet   No No   Sig: Take 1 tablet (500 mg total) by mouth every 6 (six) hours   albuterol (Ventolin HFA) 90 mcg/act inhaler   No No   Sig: Inhale 2 puffs every 6 (six) hours as needed for wheezing   celecoxib (CeleBREX) 200 mg capsule   No No   Sig: Take 1 capsule (200 mg total) by mouth 2 (two) times a day   naloxone (NARCAN) 4 mg/0 1 mL nasal spray   No No   Sig: Administer 1 spray into a nostril  If no response after 2-3 minutes, give another dose in the other nostril using a new spray     ondansetron (ZOFRAN) 4 mg tablet   No No   Sig: Take 1 tablet (4 mg total) by mouth every 8 (eight) hours as needed for nausea or vomiting   oxyCODONE (Roxicodone) 5 immediate release tablet   No No   Sig: Take 1 tablet (5 mg total) by mouth every 4 (four) hours as needed for moderate pain Max Daily Amount: 30 mg   pregabalin (LYRICA) 150 mg capsule   Yes No   Sig: Take 150 mg by mouth 3 (three) times a day      Facility-Administered Medications: None       Past Medical History:   Diagnosis Date   • Anxiety    • Asthma    • Brain aneurysm    • Chronic pain     back   • Depression        Past Surgical History:   Procedure Laterality Date   • ARTHROSCOPY KNEE Left 11/8/2022    Procedure: Left knee arthroscopy, medial meniscus repair, possible medial meniscectomy;  Surgeon: Rubens Saunders MD;  Location: [facility];  Service: Orthopedics   • BACK SURGERY     • LUMBAR DISCECTOMY  2015    & fusion; L5-S1       Family History   Problem Relation Age of Onset   • Cancer Mother    • Cancer Father    • Diabetes Father    • Diabetes Sister    • No Known Problems Maternal Grandmother    • No Known Problems Maternal Grandfather    • No Known Problems Paternal Grandmother    • Diabetes Paternal Grandfather      I have reviewed and agree with the history as documented  E-Cigarette/Vaping   • E-Cigarette Use Never User      E-Cigarette/Vaping Substances   • Nicotine No    • THC No    • CBD No    • Flavoring No    • Other No    • Unknown No      Social History     Tobacco Use   • Smoking status: Every Day     Packs/day: 0 25     Types: Cigarettes   • Smokeless tobacco: Never   Vaping Use   • Vaping Use: Never used   Substance Use Topics   • Alcohol use: No   • Drug use: Not Currently     Types: Marijuana     Comment: patient had medical marijuana        Review of Systems   Constitutional: Negative for chills and fever  Neurological: Negative for weakness and numbness  All other systems reviewed and are negative  Physical Exam  Physical Exam  Vitals reviewed  Constitutional:       General: He is not in acute distress  HENT:      Head: Normocephalic and atraumatic  Nose: Nose normal       Mouth/Throat:      Mouth: Mucous membranes are moist    Eyes:      General:         Right eye: No discharge  Left eye: No discharge  Conjunctiva/sclera: Conjunctivae normal    Cardiovascular:      Rate and Rhythm: Normal rate and regular rhythm  Pulmonary:      Effort: Pulmonary effort is normal  No respiratory distress  Musculoskeletal:         General: Swelling present  No deformity or signs of injury  Cervical back: Normal range of motion and neck supple  Right lower leg: No edema        Left lower leg: No edema  Comments: There is no left calf pain or lower leg swelling  In fact there appears to be some slight atrophy to the muscles of the left leg  There is some swelling to the suprapatellar area of the left knee  No erythema or warmth  There is pain with range of motion, but patient is able to spontaneously range of motion the knee  There is diffuse tenderness to the knee  Incisions look good  There well-approximated  No purulence drainage  Neurovascular exam is normal    Skin:     General: Skin is warm and dry  Coloration: Skin is not pale  Neurological:      General: No focal deficit present  Mental Status: He is alert and oriented to person, place, and time  Psychiatric:         Mood and Affect: Mood normal          Behavior: Behavior normal          Vital Signs  ED Triage Vitals   Temperature Pulse Respirations Blood Pressure SpO2   11/20/22 0545 11/20/22 0545 11/20/22 0545 11/20/22 0545 11/20/22 0545   98 4 °F (36 9 °C) 83 18 138/84 100 %      Temp src Heart Rate Source Patient Position - Orthostatic VS BP Location FiO2 (%)   -- -- 11/20/22 0748 11/20/22 0748 --     Lying Right arm       Pain Score       11/20/22 0748       10 - Worst Possible Pain           Vitals:    11/20/22 0545 11/20/22 0748   BP: 138/84 130/82   Pulse: 83 71   Patient Position - Orthostatic VS:  Lying         Visual Acuity      ED Medications  Medications   ketorolac (TORADOL) injection 60 mg (has no administration in time range)       Diagnostic Studies  Results Reviewed     None                 No orders to display              Procedures  Procedures         ED Course                               SBIRT 20yo+    Flowsheet Row Most Recent Value   SBIRT (25 yo +)    In order to provide better care to our patients, we are screening all of our patients for alcohol and drug use  Would it be okay to ask you these screening questions?  No Filed at: 11/20/2022 0704                    MDM  Number of Diagnoses or Management Options  Diagnosis management comments: X-rays not indicated  There is no acute trauma  This does not appear to be a septic knee  There is no lower leg swelling to suggest DVT  Recommended NSAIDs and follow-up with his orthopedist on Monday if he is still having problems  Amount and/or Complexity of Data Reviewed  Review and summarize past medical records: yes        Disposition  Final diagnoses:   Right knee pain     Time reflects when diagnosis was documented in both MDM as applicable and the Disposition within this note     Time User Action Codes Description Comment    11/20/2022  8:14 AM Suni SolomonCleveland Add [C40 099] Right knee pain       ED Disposition     ED Disposition   Discharge    Condition   Stable    Date/Time   Sun Nov 20, 2022  8:14 AM    [facility] discharge to home/self care  Follow-up Information     Follow up With Specialties Details Why Contact Info    Follow-up with your orthopedist on Monday for re-evaluation  Patient's Medications   Discharge Prescriptions    IBUPROFEN (MOTRIN) 800 MG TABLET    Take 1 tablet (800 mg total) by mouth every 6 (six) hours as needed (Pain)       Start Date: 11/20/2022End Date: --       Order Dose: 800 mg       Quantity: 30 tablet    Refills: 0       No discharge procedures on file      PDMP Review       Value Time User    PDMP Reviewed  Yes 11/11/2022  3:08 PM Bianka Lutz MD          ED Provider  Electronically Signed by           Darien Scanlon MD  11/20/22 1945

## 2022-11-21 ENCOUNTER — TELEPHONE (OUTPATIENT)
Dept: OBGYN CLINIC | Facility: HOSPITAL | Age: 42
End: 2022-11-21

## 2022-11-21 DIAGNOSIS — S83.232D COMPLEX TEAR OF MEDIAL MENISCUS OF LEFT KNEE AS CURRENT INJURY, SUBSEQUENT ENCOUNTER: ICD-10-CM

## 2022-11-21 RX ORDER — OXYCODONE HYDROCHLORIDE 5 MG/1
5 TABLET ORAL EVERY 4 HOURS PRN
Qty: 25 TABLET | Refills: 0 | Status: SHIPPED | OUTPATIENT
Start: 2022-11-21

## 2022-11-21 NOTE — TELEPHONE ENCOUNTER
Caller: self    Doctor: Maulik Ramos    Reason for call: Patient called stating he was in increased pain 9/10   Would like to know what he can do for pain and can provider call in a script to [phone] [facility]    Call back#: [phone]

## 2022-11-21 NOTE — TELEPHONE ENCOUNTER
Caller: Patient    Doctor: Theo Rivas    Reason for call:     Patient is asking about his pain medication, his sister is the only one that can get his medication and she goes to work in 1 hour, he wants her to  his medication     Uses [facility] in [facility] on file      Call back#: [phone]

## 2022-11-25 ENCOUNTER — OFFICE VISIT (OUTPATIENT)
Dept: OBGYN CLINIC | Facility: CLINIC | Age: 42
End: 2022-11-25

## 2022-11-25 VITALS — HEIGHT: 68 IN | BODY MASS INDEX: 21.98 KG/M2 | WEIGHT: 145 LBS

## 2022-11-25 DIAGNOSIS — S83.232D COMPLEX TEAR OF MEDIAL MENISCUS OF LEFT KNEE AS CURRENT INJURY, SUBSEQUENT ENCOUNTER: Primary | ICD-10-CM

## 2022-11-25 RX ORDER — PREGABALIN 200 MG/1
200 CAPSULE ORAL 3 TIMES DAILY
Qty: 90 CAPSULE | Refills: 1 | Status: SHIPPED | OUTPATIENT
Start: 2022-11-25

## 2022-11-25 RX ORDER — CELECOXIB 200 MG/1
200 CAPSULE ORAL 2 TIMES DAILY
Qty: 60 CAPSULE | Refills: 1 | Status: SHIPPED | OUTPATIENT
Start: 2022-11-25

## 2022-11-25 NOTE — PROGRESS NOTES
Assessment:   S/P:  Left knee arthroscopy with medial meniscus all-inside repair    Plan:   Elliot Cardenas continues to progress well despite some excessive postop pain  We again discussed this pain and the fact that his previous opiod use does place him at higher risk for worse pain after surgery  I did not provide any further narcotic today, though I did refill celebrex and his lyrica which has from another prescriber but was about to run out of  He has still not started PT  He states that the first appointment he could get was in early January  I have made several attempts to reach out to the PT department to help getting him scheduled  We attempted again today but the PT office closed early today and we were not able to make further progress  He will continue home range of motion and quad activation exercises  I will continue to help try to get him in with someone in PT as soon as possible  Follow up in 4 weeks  CHIEF COMPLAINT:  Chief Complaint   Patient presents with   • Left Knee - Follow-up         SUBJECTIVE:  Barbara Lester is a 43y o  year old male who presents for follow up after left knee arthroscopy with medial meniscus repair, all-inside  Returns 3 days after surgery due to significant pain  Has been following weight-bearing limitations and wearing his brace  Has not noticed any drainage from the wound  No fevers or chills  No further injuries since the surgery  PHYSICAL EXAMINATION:  General: well developed and well nourished, alert, oriented times 3 and appears comfortable  Psychiatric: Normal    MUSCULOSKELETAL EXAMINATION:  Incision: Clean, dry, intact  Range of Motion:  Motion is improving  Full extension to 90 of flexion today  MCL which was partially released during surgery has stabilized already as there is not significant laxity to valgus stress     Neurovascularly intact

## 2022-11-28 ENCOUNTER — HOSPITAL ENCOUNTER (EMERGENCY)
Facility: HOSPITAL | Age: 42
Discharge: HOME | End: 2022-11-28
Attending: EMERGENCY MEDICINE

## 2022-11-28 ENCOUNTER — TELEPHONE (OUTPATIENT)
Dept: OBGYN CLINIC | Facility: MEDICAL CENTER | Age: 42
End: 2022-11-28

## 2022-11-28 VITALS
OXYGEN SATURATION: 98 % | TEMPERATURE: 97.3 F | BODY MASS INDEX: 23.32 KG/M2 | DIASTOLIC BLOOD PRESSURE: 71 MMHG | SYSTOLIC BLOOD PRESSURE: 123 MMHG | RESPIRATION RATE: 18 BRPM | WEIGHT: 153.4 LBS | HEART RATE: 86 BPM

## 2022-11-28 DIAGNOSIS — M25.561 RIGHT KNEE PAIN, UNSPECIFIED CHRONICITY: Primary | ICD-10-CM

## 2022-11-28 DIAGNOSIS — S83.232D COMPLEX TEAR OF MEDIAL MENISCUS OF LEFT KNEE AS CURRENT INJURY, SUBSEQUENT ENCOUNTER: Primary | ICD-10-CM

## 2022-11-28 RX ORDER — KETOROLAC TROMETHAMINE 30 MG/ML
15 INJECTION, SOLUTION INTRAMUSCULAR; INTRAVENOUS ONCE
Status: COMPLETED | OUTPATIENT
Start: 2022-11-28 | End: 2022-11-28

## 2022-11-28 RX ORDER — LIDOCAINE 50 MG/G
1 PATCH TOPICAL ONCE
Status: DISCONTINUED | OUTPATIENT
Start: 2022-11-28 | End: 2022-11-28 | Stop reason: HOSPADM

## 2022-11-28 RX ORDER — OXYCODONE HYDROCHLORIDE 5 MG/1
5 TABLET ORAL ONCE
Status: COMPLETED | OUTPATIENT
Start: 2022-11-28 | End: 2022-11-28

## 2022-11-28 RX ORDER — TRAMADOL HYDROCHLORIDE 50 MG/1
50 TABLET ORAL EVERY 6 HOURS PRN
Qty: 30 TABLET | Refills: 1 | Status: SHIPPED | OUTPATIENT
Start: 2022-11-28 | End: 2022-12-05 | Stop reason: SDUPTHER

## 2022-11-28 RX ADMIN — OXYCODONE HYDROCHLORIDE 5 MG: 5 TABLET ORAL at 17:37

## 2022-11-28 RX ADMIN — KETOROLAC TROMETHAMINE 15 MG: 30 INJECTION, SOLUTION INTRAMUSCULAR; INTRAVENOUS at 17:37

## 2022-11-28 RX ADMIN — LIDOCAINE 1 PATCH: 50 PATCH TOPICAL at 17:38

## 2022-11-28 NOTE — TELEPHONE ENCOUNTER
Caller: Patient    Doctor: Catina Rocha    Reason for call:     Patient is calling about extreme pain of 9 in his left knee all the way down to his foot  He is taking tylenol and he states nothing is helping his pain  He is asking for a call back relating this or should he go to the ED? Please call       Call back#: [phone]

## 2022-11-28 NOTE — DISCHARGE INSTRUCTIONS
You have been evaluated in the Emergency Department today for R knee pain  Please rest, ice, and elevate your R knee    We recommend you take 600mg ibuprofen every 6 hours or tylenol 650mg every 6 hours as needed for pain  If needed, you can alternate these medications so that you take one medication every 3 hours  For instance, at noon take ibuprofen, then at 3pm take tylenol, then at 6pm take ibuprofen  Your orthopedist has prescribed Tramadol we recommend taking this medication and following with your orthopaedic doctor at your earliest convenience  [address], [address]    Please schedule an appointment for follow up with your primary care physician this week  Return to the Emergency Department if you experience worsening pain, numbness, tingling, change of color in your toes, or any other concerning symptoms  Thank you for choosing us for your care

## 2022-11-29 NOTE — TELEPHONE ENCOUNTER
Patient states that he wants to talk to about the movement of his L knee  He still has not got approval thru [facility] for PT and the Lyrica needed prior auth as well

## 2022-11-29 NOTE — ED PROVIDER NOTES
History  Chief Complaint   Patient presents with   • Knee Pain     L knee pain since surgery on the 8th  States he ran out of his pain meds  Called surgeon and he said they have not contacted him back  Took 2 tylenol at home  HPI  Patient is a 43-year-old male past medical history of anxiety, asthma, depression presenting today with left knee pain  Patient underwent and meniscal surgery on the 8th was initially on a course of pain medications however has been slowly discontinuing the medications of the last week and ran out of medications on Thursday  Was initially doing well without significant pain requirements however beginning yesterday and today has had severe pain  Took Tylenol this morning as well as using ice without any significant relief  Denies any increasing swelling  Pain has primarily been along the medial aspect unchanged at this time just worsened in severity  It is a numbness or weakness  Denies any injury to the area  Not currently going to physical therapy been doing home exercises  Prior to Admission Medications   Prescriptions Last Dose Informant Patient Reported? Taking?   acetaminophen (TYLENOL) 500 mg tablet   No No   Sig: Take 1 tablet (500 mg total) by mouth every 6 (six) hours   albuterol (Ventolin HFA) 90 mcg/act inhaler   No No   Sig: Inhale 2 puffs every 6 (six) hours as needed for wheezing   celecoxib (CeleBREX) 200 mg capsule   No No   Sig: Take 1 capsule (200 mg total) by mouth 2 (two) times a day   ibuprofen (MOTRIN) 800 mg tablet   No No   Sig: Take 1 tablet (800 mg total) by mouth every 6 (six) hours as needed (Pain)   naloxone (NARCAN) 4 mg/0 1 mL nasal spray   No No   Sig: Administer 1 spray into a nostril  If no response after 2-3 minutes, give another dose in the other nostril using a new spray     ondansetron (ZOFRAN) 4 mg tablet   No No   Sig: Take 1 tablet (4 mg total) by mouth every 8 (eight) hours as needed for nausea or vomiting   oxyCODONE (Roxicodone) 5 immediate release tablet   No No   Sig: Take 1 tablet (5 mg total) by mouth every 4 (four) hours as needed for severe pain Max Daily Amount: 30 mg   pregabalin (LYRICA) 200 MG capsule   No No   Sig: Take 1 capsule (200 mg total) by mouth 3 (three) times a day   traMADol (Ultram) 50 mg tablet   No No   Sig: Take 1 tablet (50 mg total) by mouth every 6 (six) hours as needed for moderate pain or severe pain      Facility-Administered Medications: None       Past Medical History:   Diagnosis Date   • Anxiety    • Asthma    • Brain aneurysm    • Chronic pain     back   • Depression        Past Surgical History:   Procedure Laterality Date   • ARTHROSCOPY KNEE Left 11/8/2022    Procedure: Left knee arthroscopy, medial meniscus repair, possible medial meniscectomy;  Surgeon: Gabe Jasmine MD;  Location: [facility];  Service: Orthopedics   • BACK SURGERY     • LUMBAR DISCECTOMY  2015    & fusion; L5-S1       Family History   Problem Relation Age of Onset   • Cancer Mother    • Cancer Father    • Diabetes Father    • Diabetes Sister    • No Known Problems Maternal Grandmother    • No Known Problems Maternal Grandfather    • No Known Problems Paternal Grandmother    • Diabetes Paternal Grandfather      I have reviewed and agree with the history as documented  E-Cigarette/Vaping   • E-Cigarette Use Never User      E-Cigarette/Vaping Substances   • Nicotine No    • THC No    • CBD No    • Flavoring No    • Other No    • Unknown No      Social History     Tobacco Use   • Smoking status: Every Day     Packs/day: 0 25     Types: Cigarettes   • Smokeless tobacco: Never   Vaping Use   • Vaping Use: Never used   Substance Use Topics   • Alcohol use: No   • Drug use: Not Currently     Types: Marijuana     Comment: patient had medical marijuana        Review of Systems   Constitutional: Negative for chills and fever  HENT: Negative for congestion, rhinorrhea and sore throat      Eyes: Negative for redness and visual disturbance  Respiratory: Negative for cough and shortness of breath  Cardiovascular: Negative for chest pain and palpitations  Gastrointestinal: Negative for constipation, diarrhea, nausea and vomiting  Genitourinary: Negative for dysuria and hematuria  Musculoskeletal: Positive for arthralgias (L knee)  Negative for myalgias and neck pain  Skin: Negative for rash and wound  Allergic/Immunologic: Negative for immunocompromised state  Neurological: Negative for seizures and syncope  Psychiatric/Behavioral: Negative for confusion and suicidal ideas  Physical Exam  Physical Exam  Vitals and nursing note reviewed  Constitutional:       General: He is not in acute distress  Appearance: Normal appearance  He is well-developed  HENT:      Head: Normocephalic and atraumatic  No raccoon eyes  Right Ear: External ear normal       Left Ear: External ear normal       Nose: Nose normal  No congestion  Mouth/Throat:      Lips: Pink  Mouth: Mucous membranes are moist    Eyes:      General: Lids are normal  No scleral icterus  Extraocular Movements: Extraocular movements intact  Cardiovascular:      Rate and Rhythm: Normal rate and regular rhythm  Heart sounds: No murmur heard  No friction rub  Pulmonary:      Effort: Pulmonary effort is normal  No respiratory distress  Breath sounds: No wheezing or rhonchi  Abdominal:      General: Abdomen is flat  Palpations: Abdomen is soft  Tenderness: There is no abdominal tenderness  There is no guarding or rebound  Musculoskeletal:      Cervical back: Normal range of motion  No torticollis  Right knee: Normal range of motion  Left knee: Swelling (mild) present  No erythema  Tenderness (medial aspect) present  Skin:     General: Skin is warm and dry  Coloration: Skin is not jaundiced  Findings: No rash     Neurological:      Mental Status: He is alert and oriented to person, place, and time  Mental status is at baseline  Psychiatric:         Behavior: Behavior normal  Behavior is cooperative  Vital Signs  ED Triage Vitals [11/28/22 1654]   Temperature Pulse Respirations Blood Pressure SpO2   (!) 97 3 °F (36 3 °C) 86 18 123/71 98 %      Temp Source Heart Rate Source Patient Position - Orthostatic VS BP Location FiO2 (%)   Tympanic Monitor Sitting Right arm --      Pain Score       10 - Worst Possible Pain           Vitals:    11/28/22 1654   BP: 123/71   Pulse: 86   Patient Position - Orthostatic VS: Sitting         Visual Acuity      ED Medications  Medications   lidocaine (LIDODERM) 5 % patch 1 patch (1 patch Topical Medication Applied 11/28/22 1738)   ketorolac (TORADOL) injection 15 mg (15 mg Intramuscular Given 11/28/22 1737)   oxyCODONE (ROXICODONE) IR tablet 5 mg (5 mg Oral Given 11/28/22 1737)       Diagnostic Studies  Results Reviewed     None                 No orders to display              Procedures  Procedures         ED Course                               SBIRT 22yo+    Flowsheet Row Most Recent Value   SBIRT (23 yo +)    In order to provide better care to our patients, we are screening all of our patients for alcohol and drug use  Would it be okay to ask you these screening questions? No Filed at: 11/28/2022 1657                    [facility]  Patient on arrival is ambulatory to room is in no acute distress, vital signs stable, afebrile  On exam lungs clear auscultation, heart without murmurs rubs or gallops abdomen soft nontender  Patient is weight-bearing as tolerated on crutches  Tenderness along the medial aspect of the knee  Will treat with multimodal pain control including Toradol , oxycodone and lidocaine patch  Patient has spoken with his primary Orthopedics who prescribed him tramadol and will help with follow-up  Will discharge in stable condition  Return precautions discussed    Disposition  Final diagnoses:   Right knee pain, unspecified chronicity     Time reflects when diagnosis was documented in both MDM as applicable and the Disposition within this note     Time User Action Codes Description Comment    11/28/2022  5:19 PM Alycia Sharif [I37 [id number]] Right knee pain, unspecified chronicity       ED Disposition     ED Disposition   Discharge    Condition   Stable    Date/Time   Mon Nov 28, 2022 [address] discharge to home/self care  Follow-up Information    None         Discharge Medication List as of 11/28/2022  5:21 PM      START taking these medications    Details   traMADol (Ultram) 50 mg tablet Take 1 tablet (50 mg total) by mouth every 6 (six) hours as needed for moderate pain or severe pain, Starting Mon 11/28/2022, Normal         CONTINUE these medications which have NOT CHANGED    Details   acetaminophen (TYLENOL) 500 mg tablet Take 1 tablet (500 mg total) by mouth every 6 (six) hours, Starting Tue 11/8/2022, Normal      albuterol (Ventolin HFA) 90 mcg/act inhaler Inhale 2 puffs every 6 (six) hours as needed for wheezing, Starting Wed 12/8/2021, Normal      celecoxib (CeleBREX) 200 mg capsule Take 1 capsule (200 mg total) by mouth 2 (two) times a day, Starting Fri 11/25/2022, Normal      ibuprofen (MOTRIN) 800 mg tablet Take 1 tablet (800 mg total) by mouth every 6 (six) hours as needed (Pain), Starting Sun 11/20/2022, Normal      naloxone (NARCAN) 4 mg/0 1 mL nasal spray Administer 1 spray into a nostril   If no response after 2-3 minutes, give another dose in the other nostril using a new spray , Normal      ondansetron (ZOFRAN) 4 mg tablet Take 1 tablet (4 mg total) by mouth every 8 (eight) hours as needed for nausea or vomiting, Starting Tue 11/8/2022, Normal      oxyCODONE (Roxicodone) 5 immediate release tablet Take 1 tablet (5 mg total) by mouth every 4 (four) hours as needed for severe pain Max Daily Amount: 30 mg, Starting Mon 11/21/2022, Normal      pregabalin (LYRICA) 200 MG capsule Take 1 capsule (200 mg total) by mouth 3 (three) times a day, Starting Fri 11/25/2022, Normal             No discharge procedures on file      PDMP Review       Value Time User    PDMP Reviewed  Yes 11/11/2022  3:08 PM Vinita Duron MD          ED Provider  Electronically Signed by           Ishan Ly MD  11/28/22 4611

## 2022-12-01 ENCOUNTER — DOCUMENTATION (OUTPATIENT)
Dept: OBGYN CLINIC | Facility: CLINIC | Age: 42
End: 2022-12-01

## 2022-12-05 ENCOUNTER — TELEPHONE (OUTPATIENT)
Dept: OBGYN CLINIC | Facility: HOSPITAL | Age: 42
End: 2022-12-05

## 2022-12-05 DIAGNOSIS — S83.232D COMPLEX TEAR OF MEDIAL MENISCUS OF LEFT KNEE AS CURRENT INJURY, SUBSEQUENT ENCOUNTER: ICD-10-CM

## 2022-12-05 RX ORDER — TRAMADOL HYDROCHLORIDE 50 MG/1
50 TABLET ORAL EVERY 6 HOURS PRN
Qty: 30 TABLET | Refills: 1 | Status: SHIPPED | OUTPATIENT
Start: 2022-12-05 | End: 2022-12-13 | Stop reason: SDUPTHER

## 2022-12-05 NOTE — TELEPHONE ENCOUNTER
Caller: patient    Doctor: Abagail Bamberger    Reason for call: patient has questions about his home exercises      Call back#: [phone]

## 2022-12-05 NOTE — TELEPHONE ENCOUNTER
Pt contacted Call Center requested refill of their medication  Medication Name: Tramadol      Dosage of Med: 50mg      Frequency of Med: 1 tab every 6      Remaining Medication: 0      [facility]  Preferred Callback Phone Number: [phone]      Thank you  PLEASE ADVISE PATIENTS:    REFILL REQUESTS WILL BE PROCESSED WITHIN 24-48 HOURS  Pt contacted Call Center requested refill of their medication  Medication Name: Zofran      Dosage of Med: 4mg      Frequency of Med: 1 tab every 8 hrs      Remaining Medication: 0      [facility]  Preferred Callback Phone Number: [phone]      Thank you  PLEASE ADVISE PATIENTS:    REFILL REQUESTS WILL BE PROCESSED WITHIN 24-48 HOURS

## 2022-12-12 ENCOUNTER — TELEPHONE (OUTPATIENT)
Dept: OBGYN CLINIC | Facility: HOSPITAL | Age: 42
End: 2022-12-12

## 2022-12-12 DIAGNOSIS — S83.232D COMPLEX TEAR OF MEDIAL MENISCUS OF LEFT KNEE AS CURRENT INJURY, SUBSEQUENT ENCOUNTER: Primary | ICD-10-CM

## 2022-12-12 RX ORDER — LIDOCAINE 4 G/G
1 PATCH TOPICAL DAILY
Qty: 15 PATCH | Refills: 1 | Status: SHIPPED | OUTPATIENT
Start: 2022-12-12 | End: 2022-12-23 | Stop reason: SDUPTHER

## 2022-12-12 NOTE — TELEPHONE ENCOUNTER
Pt contacted Call Center requested refill of their medication  Medication Name:   traMADol        Dosage of Med: 50 mg      Frequency of Med:   Take 1 tablet (50 mg total) by mouth every 6 (six) hours as needed for moderate pain or severe pain    Remaining Medication:  0  Patient is asking for a few more pills, he is hurting more after PT    Pharmacy and Location:   [address] [place] [address], [address]     Pt  Preferred Callback Phone Number:   [phone]     Thank you  PLEASE ADVISE PATIENTS:    REFILL REQUESTS WILL BE PROCESSED WITHIN 24-48 HOURS

## 2022-12-12 NOTE — TELEPHONE ENCOUNTER
Caller: N/a    Doctor: N/a    Reason for call: [facility] would like the PT protocol faxed to them  Please see Dr Alejandro Fields protocol message  I am in the call center and cannot fax this       Call back#: N/A

## 2022-12-12 NOTE — TELEPHONE ENCOUNTER
Caller: Winter Cullen @ [facility]    Doctor: Kayla Duran    Reason for call: faxed over 11/25/22 office note      Call back#: Micki Ivey [phone]  Fax: [phone]

## 2022-12-12 NOTE — TELEPHONE ENCOUNTER
Caller: Anjel Nichols    Doctor: Dr Jennie Brooks    Reason for call: [facility] looking for physical therapy protocol for the patient  Can it be faxed to [phone]?     Call back#: [phone]

## 2022-12-12 NOTE — TELEPHONE ENCOUNTER
Caller: patient    Doctor: Aleena Mccabe    Reason for call: faxed over PT script to [facility] - Fax: [phone]    Call back#: [phone]

## 2022-12-12 NOTE — TELEPHONE ENCOUNTER
Caller: Ely Campbell    Doctor: Elijah Alexandra     Reason for call: Patient is wondering if you can send in lidocaine patches or lotion in for him     [address]     Call back#: [phone]

## 2022-12-13 ENCOUNTER — TELEPHONE (OUTPATIENT)
Dept: OBGYN CLINIC | Facility: HOSPITAL | Age: 42
End: 2022-12-13

## 2022-12-13 ENCOUNTER — TELEPHONE (OUTPATIENT)
Dept: OBGYN CLINIC | Facility: MEDICAL CENTER | Age: 42
End: 2022-12-13

## 2022-12-13 DIAGNOSIS — S83.232D COMPLEX TEAR OF MEDIAL MENISCUS OF LEFT KNEE AS CURRENT INJURY, SUBSEQUENT ENCOUNTER: ICD-10-CM

## 2022-12-13 DIAGNOSIS — S83.212D BUCKET-HANDLE TEAR OF MEDIAL MENISCUS OF LEFT KNEE AS CURRENT INJURY, SUBSEQUENT ENCOUNTER: Primary | ICD-10-CM

## 2022-12-13 RX ORDER — TRAMADOL HYDROCHLORIDE 50 MG/1
50 TABLET ORAL EVERY 6 HOURS PRN
Qty: 30 TABLET | Refills: 0 | Status: SHIPPED | OUTPATIENT
Start: 2022-12-13 | End: 2022-12-20 | Stop reason: SDUPTHER

## 2022-12-13 RX ORDER — CYCLOBENZAPRINE HCL 10 MG
10 TABLET ORAL 3 TIMES DAILY PRN
Qty: 30 TABLET | Refills: 1 | Status: SHIPPED | OUTPATIENT
Start: 2022-12-13

## 2022-12-13 NOTE — TELEPHONE ENCOUNTER
Caller: Jackie Tapia     Doctor: Terese Dominguez // [facility]     Reason for call: Patient requesting to only speak with Dr Terese Dominguez regarding  his PT  Patient requesting refill on his pain medication   Pt contacted Call Center requested refill of their medication  Medication Name: Tramadol    Dosage of Med: 50 mg       Frequency of Med: Q 4-5 hrs   Remaining Medication: 0      Pharmacy and Location: [facility] // [facility] // [address] [place] [address]   Preferred Callback Phone Number: [phone]

## 2022-12-13 NOTE — TELEPHONE ENCOUNTER
Caller: Laurine Romberg     Doctor: Kayla Duran     Reason for call: Pt calling regarding Physical Therapy and Medication  Pt has questions  Please advise patient   Thank you     Call back#: [phone]

## 2022-12-19 ENCOUNTER — HOSPITAL ENCOUNTER (EMERGENCY)
Facility: HOSPITAL | Age: 42
Discharge: HOME | End: 2022-12-19
Attending: EMERGENCY MEDICINE

## 2022-12-19 ENCOUNTER — TELEPHONE (OUTPATIENT)
Dept: OBGYN CLINIC | Facility: HOSPITAL | Age: 42
End: 2022-12-19

## 2022-12-19 ENCOUNTER — APPOINTMENT (EMERGENCY)
Dept: RADIOLOGY | Facility: HOSPITAL | Age: 42
End: 2022-12-19

## 2022-12-19 VITALS
RESPIRATION RATE: 16 BRPM | TEMPERATURE: 98.7 F | OXYGEN SATURATION: 98 % | DIASTOLIC BLOOD PRESSURE: 97 MMHG | HEART RATE: 91 BPM | SYSTOLIC BLOOD PRESSURE: 152 MMHG

## 2022-12-19 DIAGNOSIS — S83.92XA LEFT KNEE SPRAIN: Primary | ICD-10-CM

## 2022-12-19 RX ORDER — LIDOCAINE 50 MG/G
1 PATCH TOPICAL ONCE
Status: DISCONTINUED | OUTPATIENT
Start: 2022-12-19 | End: 2022-12-19

## 2022-12-19 RX ORDER — KETOROLAC TROMETHAMINE 30 MG/ML
15 INJECTION, SOLUTION INTRAMUSCULAR; INTRAVENOUS ONCE
Status: COMPLETED | OUTPATIENT
Start: 2022-12-19 | End: 2022-12-19

## 2022-12-19 RX ORDER — HYDROCODONE BITARTRATE AND ACETAMINOPHEN 5; 325 MG/1; MG/1
1 TABLET ORAL ONCE
Status: COMPLETED | OUTPATIENT
Start: 2022-12-19 | End: 2022-12-19

## 2022-12-19 RX ADMIN — HYDROCODONE BITARTRATE AND ACETAMINOPHEN 1 TABLET: 5; 325 TABLET ORAL at 19:21

## 2022-12-19 RX ADMIN — KETOROLAC TROMETHAMINE 15 MG: 30 INJECTION, SOLUTION INTRAMUSCULAR; INTRAVENOUS at 20:36

## 2022-12-19 NOTE — TELEPHONE ENCOUNTER
Patient had 4th session of PT and feels pain and a lump on outside of knee. Advised Ice 30 on 30 off. Tylenol 1000mg TID and ibuprofen 800mg TID as directed with food. He states he is doing this. He is requesting Tramadol refill for pain. Please advise.

## 2022-12-19 NOTE — TELEPHONE ENCOUNTER
Caller: Patient  Doctor: Romel Burns    Reason for call: Patient called to advise he had PT today and now has some concerns regarding the side of his knee.  He requested a call back    Call back#: [phone]

## 2022-12-20 DIAGNOSIS — S83.232D COMPLEX TEAR OF MEDIAL MENISCUS OF LEFT KNEE AS CURRENT INJURY, SUBSEQUENT ENCOUNTER: ICD-10-CM

## 2022-12-20 RX ORDER — TRAMADOL HYDROCHLORIDE 50 MG/1
50 TABLET ORAL EVERY 6 HOURS PRN
Qty: 30 TABLET | Refills: 1 | Status: SHIPPED | OUTPATIENT
Start: 2022-12-20 | End: 2022-12-25 | Stop reason: SDUPTHER

## 2022-12-20 NOTE — ED PROVIDER NOTES
History  Chief Complaint   Patient presents with   • Knee Pain     Patient had surgery on left knee 11/8 for meniscus tear  Patient was at physical therapy today at 1230  Patient reports stretching during therapy when he began feeling pain and swelling in left knee  Patient reports pain and swelling increasing  Tylenol last taken at 0     55-year-old male presents for evaluation of left knee pain and swelling  Patient does admit having left knee pain for several months after having meniscus surgery  He states that while he was at physical therapy today he fell he overstretched his leg and had sudden onset of worsening sharp severe pain in the left knee  Pain is been constant, getting rest worse rated as severe  He states he looks slightly more swollen than normal   Pain is worse with using his knee  No joint instability, fevers, chills, other joint pains or swelling, nausea, vomiting, fevers  minimal improvement with tylenol  History provided by:  Patient and relative      Prior to Admission Medications   Prescriptions Last Dose Informant Patient Reported? Taking? Lidocaine (HM Lidocaine Patch) 4 % PTCH   No No   Sig: Apply 1 patch topically in the morning   acetaminophen (TYLENOL) 500 mg tablet   No No   Sig: Take 1 tablet (500 mg total) by mouth every 6 (six) hours   albuterol (Ventolin HFA) 90 mcg/act inhaler   No No   Sig: Inhale 2 puffs every 6 (six) hours as needed for wheezing   celecoxib (CeleBREX) 200 mg capsule   No No   Sig: Take 1 capsule (200 mg total) by mouth 2 (two) times a day   cyclobenzaprine (FLEXERIL) 10 mg tablet   No No   Sig: Take 1 tablet (10 mg total) by mouth 3 (three) times a day as needed for muscle spasms   ibuprofen (MOTRIN) 800 mg tablet   No No   Sig: Take 1 tablet (800 mg total) by mouth every 6 (six) hours as needed (Pain)   naloxone (NARCAN) 4 mg/0 1 mL nasal spray   No No   Sig: Administer 1 spray into a nostril   If no response after 2-3 minutes, give another dose in the other nostril using a new spray  ondansetron (ZOFRAN) 4 mg tablet   No No   Sig: Take 1 tablet (4 mg total) by mouth every 8 (eight) hours as needed for nausea or vomiting   oxyCODONE (Roxicodone) 5 immediate release tablet   No No   Sig: Take 1 tablet (5 mg total) by mouth every 4 (four) hours as needed for severe pain Max Daily Amount: 30 mg   pregabalin (LYRICA) 200 MG capsule   No No   Sig: Take 1 capsule (200 mg total) by mouth 3 (three) times a day   traMADol (Ultram) 50 mg tablet   No No   Sig: Take 1 tablet (50 mg total) by mouth every 6 (six) hours as needed for moderate pain or severe pain      Facility-Administered Medications: None       Past Medical History:   Diagnosis Date   • Anxiety    • Asthma    • Brain aneurysm    • Chronic pain     back   • Depression        Past Surgical History:   Procedure Laterality Date   • ARTHROSCOPY KNEE Left 11/8/2022    Procedure: Left knee arthroscopy, medial meniscus repair, possible medial meniscectomy;  Surgeon: Martina Humphrey MD;  Location: [facility] OR;  Service: Orthopedics   • BACK SURGERY     • LUMBAR DISCECTOMY  2015    & fusion; L5-S1       Family History   Problem Relation Age of Onset   • Cancer Mother    • Cancer Father    • Diabetes Father    • Diabetes Sister    • No Known Problems Maternal Grandmother    • No Known Problems Maternal Grandfather    • No Known Problems Paternal Grandmother    • Diabetes Paternal Grandfather      I have reviewed and agree with the history as documented      E-Cigarette/Vaping   • E-Cigarette Use Never User      E-Cigarette/Vaping Substances   • Nicotine No    • THC No    • CBD No    • Flavoring No    • Other No    • Unknown No      Social History     Tobacco Use   • Smoking status: Every Day     Packs/day: 0 25     Types: Cigarettes   • Smokeless tobacco: Never   Vaping Use   • Vaping Use: Never used   Substance Use Topics   • Alcohol use: No   • Drug use: Not Currently     Types: Marijuana     Comment: patient had medical marijuana        Review of Systems   Constitutional: Negative for chills, diaphoresis, fatigue and fever  HENT: Negative for congestion, ear pain, rhinorrhea, sinus pressure, sneezing, sore throat and trouble swallowing  Eyes: Negative for pain and visual disturbance  Respiratory: Negative for cough, chest tightness, shortness of breath, wheezing and stridor  Cardiovascular: Negative for chest pain, palpitations and leg swelling  Gastrointestinal: Negative for abdominal pain, blood in stool, constipation, diarrhea, nausea and vomiting  Endocrine: Negative for polydipsia, polyphagia and polyuria  Genitourinary: Negative for decreased urine volume, dysuria, flank pain, frequency, hematuria and urgency  Musculoskeletal: Negative for arthralgias and myalgias  Skin: Negative for color change and rash  Neurological: Negative for dizziness, seizures, light-headedness, numbness and headaches  Hematological: Negative for adenopathy  Psychiatric/Behavioral: The patient is not nervous/anxious  Physical Exam  Physical Exam  Constitutional:       Appearance: He is well-developed  HENT:      Head: Normocephalic and atraumatic  Eyes:      General: No scleral icterus  Conjunctiva/sclera: Conjunctivae normal    Neck:      Vascular: No JVD  Trachea: No tracheal deviation  Pulmonary:      Effort: Pulmonary effort is normal  No respiratory distress  Abdominal:      General: There is no distension  Musculoskeletal:         General: No deformity  Normal range of motion  Cervical back: Normal range of motion  Comments: Left hip/ankle exam is within normal limits  Left knee with small effusion noted  Painful active range of motion but painless passive range of motion  Patient has full range of motion  No redness or warmth or tenderness noted  Pain with with valgus and varus stress, negative anterior and posterior drawer sign    Neurovascular intact distal    Skin:     Coloration: Skin is not pale  Findings: No erythema or rash  Neurological:      Mental Status: He is alert and oriented to person, place, and time  Psychiatric:         Behavior: Behavior normal          Vital Signs  ED Triage Vitals   Temperature Pulse Respirations Blood Pressure SpO2   12/19/22 1753 12/19/22 1754 12/19/22 1754 12/19/22 1754 12/19/22 1754   98 7 °F (37 1 °C) 91 16 152/97 98 %      Temp Source Heart Rate Source Patient Position - Orthostatic VS BP Location FiO2 (%)   12/19/22 1753 12/19/22 1754 12/19/22 1754 12/19/22 1754 --   Oral Monitor Sitting Right arm       Pain Score       12/19/22 1921       10 - Worst Possible Pain           Vitals:    12/19/22 1754   BP: 152/97   Pulse: 91   Patient Position - Orthostatic VS: Sitting         Visual Acuity      ED Medications  Medications   HYDROcodone-acetaminophen (NORCO) 5-325 mg per tablet 1 tablet (1 tablet Oral Given 12/19/22 1921)   ketorolac (TORADOL) injection 15 mg (15 mg Intramuscular Given 12/19/22 2036)       Diagnostic Studies  Results Reviewed     None                 XR knee 4+ views left injury   ED Interpretation by Kacey Sanabria MD (12/19 2022)   Primary reviewed: no acute abnormality                 Procedures  Procedures         ED Course                               SBIRT 20yo+    Flowsheet Row Most Recent Value   SBIRT (23 yo +)    In order to provide better care to our patients, we are screening all of our patients for alcohol and drug use  Would it be okay to ask you these screening questions? Yes Filed at: 12/19/2022 1842   Initial Alcohol Screen: US AUDIT-C     1  How often do you have a drink containing alcohol? 0 Filed at: 12/19/2022 1842   2  How many drinks containing alcohol do you have on a typical day you are drinking? 0 Filed at: 12/19/2022 1842   3a  Male UNDER 65: How often do you have five or more drinks on one occasion?  0 Filed at: 12/19/2022 1842   Audit-C Score 0 Filed at: 12/19/2022 1842   ALVIN: How many times in the past year have you    Used an illegal drug or used a prescription medication for non-medical reasons? Never Filed at: 12/19/2022 1842                    [facility]  Number of Diagnoses or Management Options  Left knee sprain  Diagnosis management comments: traumatic left knee pain without redness or warmth to suggest infectious etiology or gout  Knee is grossly stable  We will do x-ray to rule out fracture or dislocation, treat symptoms, continue with knee immobilizer, crutches, Ortho follow-up      Disposition  Final diagnoses:   Left knee sprain     Time reflects when diagnosis was documented in both MDM as applicable and the Disposition within this note     Time User Action Codes Description Comment    12/19/2022  8:23 PM Cam Shaheed Add Leonard [place]  92XA] Left knee sprain       ED Disposition     ED Disposition   Discharge    Condition   Stable    Date/Time   Mon Dec 19, 2022  8:23 PM    [place] discharge to home/self care  Follow-up Information     Follow up With Specialties Details Why Contact Info Additional [address] [facility] Orthopedic Surgery Schedule an appointment as soon as possible for a visit in 2 days  [address] [facility]  [place] [address] [facility] [address]  [address] [place] [address], [address] [facility], [address] [place], [place], [address] [phone]          Patient's Medications   Discharge Prescriptions    No medications on file       No discharge procedures on file      PDMP Review       Value Time User    PDMP Reviewed  Yes 11/11/2022  3:08 PM Olinda Mathews MD          ED Provider  Electronically Signed by           Denis Ortega MD  12/19/22 2943

## 2022-12-23 ENCOUNTER — OFFICE VISIT (OUTPATIENT)
Dept: OBGYN CLINIC | Facility: CLINIC | Age: 42
End: 2022-12-23

## 2022-12-23 VITALS — BODY MASS INDEX: 23.19 KG/M2 | HEIGHT: 68 IN | WEIGHT: 153 LBS

## 2022-12-23 DIAGNOSIS — S83.232D COMPLEX TEAR OF MEDIAL MENISCUS OF LEFT KNEE AS CURRENT INJURY, SUBSEQUENT ENCOUNTER: Primary | ICD-10-CM

## 2022-12-23 RX ORDER — LIDOCAINE 4 G/G
1 PATCH TOPICAL DAILY
Qty: 15 PATCH | Refills: 1 | Status: SHIPPED | OUTPATIENT
Start: 2022-12-23

## 2022-12-25 DIAGNOSIS — S83.232D COMPLEX TEAR OF MEDIAL MENISCUS OF LEFT KNEE AS CURRENT INJURY, SUBSEQUENT ENCOUNTER: ICD-10-CM

## 2022-12-26 NOTE — PROGRESS NOTES
Assessment:   S/P:  Left knee arthroscopy with medial meniscus all-inside repair    Plan:     Given his new episode of pain, swelling, decreased motion, and pain with weight bearing, I recommended an new MRI to evaluate the previous meniscus repair site  I will see him back after MRI to discuss findings and consider additional treatment options       CHIEF COMPLAINT:  Chief Complaint   Patient presents with   • Left Knee - Follow-up         SUBJECTIVE:  Nacho Pizarro is a 43y o  year old male who presents for follow up after left knee arthroscopy with medial meniscus repair, all-inside  6 weeks out from surgery  At his last PT session he was stretching the knee and felt acute pain and swelling  Since then he has had pain, decreased motion, and pain with weight bearing  PHYSICAL EXAMINATION:  General: well developed and well nourished, alert, oriented times 3 and appears comfortable  Psychiatric: Normal    MUSCULOSKELETAL EXAMINATION:  Incision: Clean, dry, intact  Range of Motion: There is an effusion  Motion from 0-45  Knee is stable  Mild pain with valgus testing   NVI

## 2022-12-27 ENCOUNTER — TELEPHONE (OUTPATIENT)
Dept: OBGYN CLINIC | Facility: MEDICAL CENTER | Age: 42
End: 2022-12-27

## 2022-12-27 RX ORDER — TRAMADOL HYDROCHLORIDE 50 MG/1
50 TABLET ORAL EVERY 6 HOURS PRN
Qty: 30 TABLET | Refills: 0 | Status: SHIPPED | OUTPATIENT
Start: 2022-12-27

## 2022-12-27 NOTE — TELEPHONE ENCOUNTER
Caller: Jacquie Reece at [facility]:   Jerad Ortega    Reason for call:   Requesting office notes, faxed completed    Call back#: n/a

## 2022-12-28 ENCOUNTER — HOSPITAL ENCOUNTER (OUTPATIENT)
Dept: MRI IMAGING | Facility: HOSPITAL | Age: 42
Discharge: HOME | End: 2022-12-28
Attending: ORTHOPAEDIC SURGERY

## 2022-12-28 DIAGNOSIS — S83.232D COMPLEX TEAR OF MEDIAL MENISCUS OF LEFT KNEE AS CURRENT INJURY, SUBSEQUENT ENCOUNTER: ICD-10-CM

## 2022-12-29 RX ORDER — OXYCODONE HYDROCHLORIDE 5 MG/1
5 TABLET ORAL EVERY 4 HOURS PRN
Qty: 25 TABLET | Refills: 0 | Status: SHIPPED | OUTPATIENT
Start: 2022-12-29

## 2022-12-30 ENCOUNTER — OFFICE VISIT (OUTPATIENT)
Dept: OBGYN CLINIC | Facility: CLINIC | Age: 42
End: 2022-12-30

## 2022-12-30 VITALS — WEIGHT: 147 LBS | HEIGHT: 68 IN | BODY MASS INDEX: 22.28 KG/M2

## 2022-12-30 DIAGNOSIS — M54.40 ACUTE BILATERAL LOW BACK PAIN WITH SCIATICA, SCIATICA LATERALITY UNSPECIFIED: ICD-10-CM

## 2022-12-30 RX ORDER — LIDOCAINE 50 MG/G
PATCH TOPICAL
COMMUNITY
Start: 2022-12-16

## 2022-12-30 RX ORDER — HYDROCODONE BITARTRATE AND ACETAMINOPHEN 5; 325 MG/1; MG/1
TABLET ORAL
COMMUNITY
Start: 2022-11-08

## 2023-01-02 NOTE — PROGRESS NOTES
Assessment:   S/P:  Left knee arthroscopy with medial meniscus all-inside repair    Plan:     I reviewed MRI which I believe shows no new complication after surgery  There is no new mensicus tearing and it is healing in some spots  The MCL was partially released during surgery to allow adequate space to complete the repair  This was healing nicely but I believe he re-injured the MCL during the stretch he was doing which also placed valgus stress on the knee  His motion is improving nicely after the set back  I recommended continuing WBAT  I recommended continuing his hinged knee brace for a long period to protect the MCL as it heals from re-injury  We discussed that I am willing to refill his tramadol once more  He expressed his goal to stop taking it within 2 more weeks  He also asks for a referral to pain management for long term management of his multiple sources of pain and lyrica prescription  He will follow up with me in 1 month  CHIEF COMPLAINT:  Chief Complaint   Patient presents with   • Left Knee - Follow-up         SUBJECTIVE:  Jodi Arambula is a 43y o  year old male who presents for follow up after left knee arthroscopy with medial meniscus repair, all-inside  6 weeks out from surgery  He was stretching in PT recently with a valgus force on the knee and felt a pop and acute pain  Was sent for MRI that is available for review today  His pain has improved from the last visit  Motion is returning again    PHYSICAL EXAMINATION:  General: well developed and well nourished, alert, oriented times 3 and appears comfortable  Psychiatric: Normal    MUSCULOSKELETAL EXAMINATION:  Incision: Clean, dry, intact  Range of Motion: There is an effusion  Motion from 0-100  Knee is stable  Mild pain with valgus testing  NVI    MRI: Medial meniscus with post surgical changes  No displacement of tear and appears to be healing   There is thickening of the MCL

## 2023-01-03 ENCOUNTER — TELEPHONE (OUTPATIENT)
Dept: OBGYN CLINIC | Facility: HOSPITAL | Age: 43
End: 2023-01-03

## 2023-01-03 NOTE — TELEPHONE ENCOUNTER
Pt contacted Call Center requested refill of their medication  Medication Name: Tramadol       Dosage of Med: 50 mg       Frequency of Med:        Remaining Medication: 0      [facility] and [facility]  Preferred Callback Phone Number: [phone]      Thank you

## 2023-01-03 NOTE — TELEPHONE ENCOUNTER
Cover [employer] requires for [employer] workers comp that the pharmacy complete the [employer] [phone]  Request faxed to [facility] [employer] on file

## 2023-01-04 NOTE — TELEPHONE ENCOUNTER
Spoke to patient and he was not aware that his PCP called in the Oxycodone and Tramadol  He wants to know what he is to do with the oxycodone  Advised that he can return to pharmacy for disposal as he does not want to take the oxy    Please advise

## 2023-01-10 ENCOUNTER — TELEPHONE (OUTPATIENT)
Dept: OBGYN CLINIC | Facility: MEDICAL CENTER | Age: 43
End: 2023-01-10

## 2023-01-10 NOTE — TELEPHONE ENCOUNTER
Caller: Harris Ahumada at [place] w/c    Doctor: Caridad Potts    Reason for call:     Confirming appointment, confirmed  She will be faxing over letter to office shortly      Call back#: n/a

## 2023-01-23 ENCOUNTER — OFFICE VISIT (OUTPATIENT)
Dept: OBGYN CLINIC | Facility: CLINIC | Age: 43
End: 2023-01-23

## 2023-01-23 VITALS
SYSTOLIC BLOOD PRESSURE: 156 MMHG | HEIGHT: 68 IN | WEIGHT: 156 LBS | BODY MASS INDEX: 23.64 KG/M2 | DIASTOLIC BLOOD PRESSURE: 107 MMHG | HEART RATE: 108 BPM

## 2023-01-23 DIAGNOSIS — S83.232D COMPLEX TEAR OF MEDIAL MENISCUS OF LEFT KNEE AS CURRENT INJURY, SUBSEQUENT ENCOUNTER: Primary | ICD-10-CM

## 2023-01-23 NOTE — LETTER
January 23, 2023     Patient: Diandra Munoz  YOB: 1980  Date of Visit: 1/23/2023      To Whom it May Concern:    Vadim Dowell is under my professional care  Jacyizaiah Jarvis was seen in my office on 1/23/2023  Jacy Jarvis is cleared to return to light duty only  He cannot lift, push, or pull greater than 10 pounds  He should not be on his feet for more than 15 minutes  He will be seen back in 4 weeks for progression  If you have any questions or concerns, please don't hesitate to call           Sincerely,          Dayanna Lim MD

## 2023-01-24 ENCOUNTER — TELEPHONE (OUTPATIENT)
Dept: OBGYN CLINIC | Facility: HOSPITAL | Age: 43
End: 2023-01-24

## 2023-01-24 RX ORDER — PREGABALIN 200 MG/1
200 CAPSULE ORAL 3 TIMES DAILY
Qty: 90 CAPSULE | Refills: 0 | Status: SHIPPED | OUTPATIENT
Start: 2023-01-24

## 2023-01-24 RX ORDER — CELECOXIB 200 MG/1
200 CAPSULE ORAL 2 TIMES DAILY
Qty: 30 CAPSULE | Refills: 0 | Status: SHIPPED | OUTPATIENT
Start: 2023-01-24

## 2023-01-24 NOTE — PROGRESS NOTES
Assessment:   S/P:  Left knee arthroscopy with medial meniscus all-inside repair    Plan:     Patient continues to improve  Continue PT per protocol  Continue ROM brace to protect MCL as it continues to heal  He Uses crutch occasionally still  He may wean off this as tolerated  He states he is no longer taking any narcotic medication that was provided by another physician  I recommended he continue to avoid these medications  I will refill his celebrex once more time  Will change to over the counter NSAIDs as needed in the future  He will be seeing his pain management physician in the near future  I discussed that I am willing to refill his lyrica one more time, but that this is not a medication I usually prescribe and I would recommend he discuss the need to continue it with his pain management physician in the future  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  Follow up in 4-6 weeks  CHIEF COMPLAINT:  Chief Complaint   Patient presents with   • Left Knee - Post-op     DOS- 11/8/22         SUBJECTIVE:  Nacho Pizarro is a 43y o  year old male who presents for follow up after left knee arthroscopy with medial meniscus repair, all-inside  10 weeks out from surgery  Knee is feeling much better over the last few weeks  Doing PT  No new issues  PHYSICAL EXAMINATION:  General: well developed and well nourished, alert, oriented times 3 and appears comfortable  Psychiatric: Normal    MUSCULOSKELETAL EXAMINATION:  Incision: Clean, dry, intact  Range of Motion: There is an effusion  Motion from 0-120  Knee is stable  Mild pain with valgus testing  NVI    MRI: Medial meniscus with post surgical changes  No displacement of tear and appears to be healing   There is thickening of the MCL

## 2023-01-24 NOTE — TELEPHONE ENCOUNTER
Caller: [facility]HAN[facility] COMP     Doctor: Greg Hooks    Reason for call: Jose L Miller called in for the last office notes to be faxed over   Fax # [phone]    Records were electronically faxed    Call back#: [phone] PMHX: beta-thalacemia, constipation. IUTD. Pt states she has had diarrhea for 3 days and this morning noticed bright red blood in stool. States it looks like the same amount she would have on a heavy day of her period. Denies abdominal pain, vomiting, fevers.

## 2023-02-06 ENCOUNTER — HOSPITAL ENCOUNTER (EMERGENCY)
Facility: HOSPITAL | Age: 43
Discharge: HOME | End: 2023-02-06
Attending: EMERGENCY MEDICINE

## 2023-02-06 ENCOUNTER — TELEPHONE (OUTPATIENT)
Dept: OBGYN CLINIC | Facility: MEDICAL CENTER | Age: 43
End: 2023-02-06

## 2023-02-06 ENCOUNTER — APPOINTMENT (EMERGENCY)
Dept: RADIOLOGY | Facility: HOSPITAL | Age: 43
End: 2023-02-06

## 2023-02-06 ENCOUNTER — TELEPHONE (OUTPATIENT)
Dept: OBGYN CLINIC | Facility: CLINIC | Age: 43
End: 2023-02-06

## 2023-02-06 VITALS
RESPIRATION RATE: 18 BRPM | TEMPERATURE: 98.4 F | DIASTOLIC BLOOD PRESSURE: 97 MMHG | BODY MASS INDEX: 23.8 KG/M2 | SYSTOLIC BLOOD PRESSURE: 165 MMHG | OXYGEN SATURATION: 99 % | WEIGHT: 156.53 LBS | HEART RATE: 105 BPM

## 2023-02-06 DIAGNOSIS — M25.562 LEFT KNEE PAIN: Primary | ICD-10-CM

## 2023-02-06 RX ORDER — OXYCODONE HYDROCHLORIDE 5 MG/1
5 TABLET ORAL ONCE
Status: COMPLETED | OUTPATIENT
Start: 2023-02-06 | End: 2023-02-06

## 2023-02-06 RX ORDER — KETOROLAC TROMETHAMINE 30 MG/ML
15 INJECTION, SOLUTION INTRAMUSCULAR; INTRAVENOUS ONCE
Status: COMPLETED | OUTPATIENT
Start: 2023-02-06 | End: 2023-02-06

## 2023-02-06 RX ORDER — LIDOCAINE 50 MG/G
1 PATCH TOPICAL ONCE
Status: DISCONTINUED | OUTPATIENT
Start: 2023-02-06 | End: 2023-02-07 | Stop reason: HOSPADM

## 2023-02-06 RX ADMIN — LIDOCAINE 1 PATCH: 50 PATCH TOPICAL at 21:55

## 2023-02-06 RX ADMIN — OXYCODONE 5 MG: 5 TABLET ORAL at 20:47

## 2023-02-06 RX ADMIN — KETOROLAC TROMETHAMINE 15 MG: 30 INJECTION, SOLUTION INTRAMUSCULAR; INTRAVENOUS at 21:54

## 2023-02-06 NOTE — TELEPHONE ENCOUNTER
Caller: Nigel Hough- calling from PT     Doctor: Dr Josefa Mayes     Reason for call: Nigel Hough is calling to let us know that the patient was in for PT today  The patient is having increased pain and there is something sticking out of his medial left knee  They got in about 20 mins of PT in and that is all that could be done due to pain  She does have a picture she can provide  Please advise       Call back#: [phone] [facility]

## 2023-02-07 ENCOUNTER — APPOINTMENT (OUTPATIENT)
Dept: LAB | Age: 43
End: 2023-02-07

## 2023-02-07 ENCOUNTER — ANESTHESIA EVENT (OUTPATIENT)
Dept: PERIOP | Facility: HOSPITAL | Age: 43
End: 2023-02-07

## 2023-02-07 ENCOUNTER — OFFICE VISIT (OUTPATIENT)
Dept: OBGYN CLINIC | Facility: CLINIC | Age: 43
End: 2023-02-07

## 2023-02-07 VITALS
HEIGHT: 68 IN | WEIGHT: 157 LBS | DIASTOLIC BLOOD PRESSURE: 101 MMHG | BODY MASS INDEX: 23.79 KG/M2 | SYSTOLIC BLOOD PRESSURE: 148 MMHG | HEART RATE: 94 BPM

## 2023-02-07 DIAGNOSIS — S83.232D COMPLEX TEAR OF MEDIAL MENISCUS OF LEFT KNEE AS CURRENT INJURY, SUBSEQUENT ENCOUNTER: ICD-10-CM

## 2023-02-07 DIAGNOSIS — S83.212D BUCKET-HANDLE TEAR OF MEDIAL MENISCUS OF LEFT KNEE AS CURRENT INJURY, SUBSEQUENT ENCOUNTER: ICD-10-CM

## 2023-02-07 DIAGNOSIS — S83.232D COMPLEX TEAR OF MEDIAL MENISCUS OF LEFT KNEE AS CURRENT INJURY, SUBSEQUENT ENCOUNTER: Primary | ICD-10-CM

## 2023-02-07 LAB
ANION GAP SERPL CALCULATED.3IONS-SCNC: 5 MMOL/L (ref 4–13)
BUN SERPL-MCNC: 15 MG/DL (ref 5–25)
CALCIUM SERPL-MCNC: 9.2 MG/DL (ref 8.3–10.1)
CHLORIDE SERPL-SCNC: 108 MMOL/L (ref 96–108)
CO2 SERPL-SCNC: 26 MMOL/L (ref 21–32)
CREAT SERPL-MCNC: 0.87 MG/DL (ref 0.6–1.3)
GFR SERPL CREATININE-BSD FRML MDRD: 106 ML/MIN/1.73SQ M
GLUCOSE P FAST SERPL-MCNC: 100 MG/DL (ref 65–99)
POTASSIUM SERPL-SCNC: 4 MMOL/L (ref 3.5–5.3)
SODIUM SERPL-SCNC: 139 MMOL/L (ref 135–147)

## 2023-02-07 RX ORDER — CHLORHEXIDINE GLUCONATE 0.12 MG/ML
15 RINSE ORAL ONCE
Status: CANCELLED | OUTPATIENT
Start: 2023-02-13 | End: 2023-02-07

## 2023-02-07 RX ORDER — CYCLOBENZAPRINE HCL 10 MG
10 TABLET ORAL 3 TIMES DAILY PRN
Qty: 30 TABLET | Refills: 1 | Status: SHIPPED | OUTPATIENT
Start: 2023-02-07

## 2023-02-07 RX ORDER — KETOROLAC TROMETHAMINE 10 MG/1
10 TABLET, FILM COATED ORAL DAILY
Qty: 7 TABLET | Refills: 0 | Status: SHIPPED | OUTPATIENT
Start: 2023-02-07 | End: 2023-02-13

## 2023-02-07 RX ORDER — CEFAZOLIN SODIUM 2 G/50ML
2000 SOLUTION INTRAVENOUS ONCE
Status: CANCELLED | OUTPATIENT
Start: 2023-02-13 | End: 2023-02-07

## 2023-02-07 NOTE — ED PROVIDER NOTES
History  Chief Complaint   Patient presents with   • Knee Pain     Patient reporting he had knee surgery in [place] yesterday started with increased swelling and "something hard" on the right side of his left knee  Patient reporting increased pain  The patient is a 42-year-old male with history of left-sided knee arthroscopy and medial meniscus repair on 11/8/2022 by Dr Norma Beck who presents to the ED with a 1 day history of left-sided knee deformity and pain  Reports that yesterday, he noted a bump to the medial side of the knee, which was painful to touch  He does report increased pain in the knee  He reports taking Tylenol and ibuprofen for pain with minimal relief  He does report that the size of the deformity has decreased since yesterday  Tonight, he reached out to Dr Norma Beck, and presented to the ED  Dr Norma Beck called the patient back while he was in the ED, and told patient he will be able to follow-up in the office tomorrow  Patient otherwise denies fever, chills, numbness, paresthesia, overlying color changes  Prior to Admission Medications   Prescriptions Last Dose Informant Patient Reported? Taking?    HYDROcodone-acetaminophen (NORCO) 5-325 mg per tablet   Yes No   Lidocaine (HM Lidocaine Patch) 4 % PTCH   No No   Sig: Apply 1 patch topically in the morning   acetaminophen (TYLENOL) 500 mg tablet   No No   Sig: Take 1 tablet (500 mg total) by mouth every 6 (six) hours   albuterol (Ventolin HFA) 90 mcg/act inhaler   No No   Sig: Inhale 2 puffs every 6 (six) hours as needed for wheezing   celecoxib (CeleBREX) 200 mg capsule   No No   Sig: Take 1 capsule (200 mg total) by mouth 2 (two) times a day   cyclobenzaprine (FLEXERIL) 10 mg tablet   No No   Sig: Take 1 tablet (10 mg total) by mouth 3 (three) times a day as needed for muscle spasms   ibuprofen (MOTRIN) 800 mg tablet   No No   Sig: Take 1 tablet (800 mg total) by mouth every 6 (six) hours as needed (Pain)   lidocaine (LIDODERM) 5 %   Yes No   Sig: APPLY 1 PATCH TOPICALLY EVERY MORNING   naloxone (NARCAN) 4 mg/0 1 mL nasal spray   No No   Sig: Administer 1 spray into a nostril  If no response after 2-3 minutes, give another dose in the other nostril using a new spray  ondansetron (ZOFRAN) 4 mg tablet   No No   Sig: Take 1 tablet (4 mg total) by mouth every 8 (eight) hours as needed for nausea or vomiting   oxyCODONE (Roxicodone) 5 immediate release tablet   No No   Sig: Take 1 tablet (5 mg total) by mouth every 4 (four) hours as needed for severe pain Max Daily Amount: 30 mg   pregabalin (LYRICA) 200 MG capsule   No No   Sig: Take 1 capsule (200 mg total) by mouth 3 (three) times a day   traMADol (Ultram) 50 mg tablet   No No   Sig: Take 1 tablet (50 mg total) by mouth every 6 (six) hours as needed for moderate pain or severe pain      Facility-Administered Medications: None       Past Medical History:   Diagnosis Date   • Anxiety    • Asthma    • Brain aneurysm    • Chronic pain     back   • Depression        Past Surgical History:   Procedure Laterality Date   • ARTHROSCOPY KNEE Left 11/8/2022    Procedure: Left knee arthroscopy, medial meniscus repair, possible medial meniscectomy;  Surgeon: Olinda Mathews MD;  Location: [facility];  Service: Orthopedics   • BACK SURGERY     • LUMBAR DISCECTOMY  2015    & fusion; L5-S1       Family History   Problem Relation Age of Onset   • Cancer Mother    • Cancer Father    • Diabetes Father    • Diabetes Sister    • No Known Problems Maternal Grandmother    • No Known Problems Maternal Grandfather    • No Known Problems Paternal Grandmother    • Diabetes Paternal Grandfather      I have reviewed and agree with the history as documented      E-Cigarette/Vaping   • E-Cigarette Use Never User      E-Cigarette/Vaping Substances   • Nicotine No    • THC No    • CBD No    • Flavoring No    • Other No    • Unknown No      Social History     Tobacco Use   • Smoking status: Every Day Packs/day: 0 25     Types: Cigarettes   • Smokeless tobacco: Never   Vaping Use   • Vaping Use: Never used   Substance Use Topics   • Alcohol use: No   • Drug use: Not Currently     Types: Marijuana     Comment: patient had medical marijuana        Review of Systems   Constitutional: Negative for chills and fever  HENT: Negative  Respiratory: Negative for cough and shortness of breath  Cardiovascular: Negative for chest pain and leg swelling  Gastrointestinal: Negative for abdominal pain, constipation, diarrhea, nausea and vomiting  Genitourinary: Negative  Musculoskeletal: Positive for arthralgias  Negative for myalgias  Skin: Negative for rash and wound  Neurological: Negative for dizziness, weakness and numbness  Psychiatric/Behavioral: Negative for behavioral problems  Physical Exam  Physical Exam  Vitals and nursing note reviewed  Constitutional:       General: He is not in acute distress  Appearance: He is well-developed  He is not ill-appearing or toxic-appearing  HENT:      Head: Normocephalic and atraumatic  Eyes:      Conjunctiva/sclera: Conjunctivae normal    Cardiovascular:      Rate and Rhythm: Normal rate and regular rhythm  Pulses: Normal pulses  Pulmonary:      Effort: Pulmonary effort is normal  No respiratory distress  Breath sounds: Normal breath sounds  Abdominal:      General: Abdomen is flat  Musculoskeletal:         General: Deformity present  No swelling  Cervical back: Neck supple  Right lower leg: No edema  Left lower leg: No edema  Comments: Deformity as pictured below to medial aspect of the left knee which is hard to touch with overlying TTP  No overlying fluctuance, erythema, warmth, or pustule  No drainage  No edema to the left lower extremity  No effusion to knee  No calf tenderness  Distal pulses 2+  Skin:     General: Skin is warm and dry        Capillary Refill: Capillary refill takes less than 2 seconds  Neurological:      Mental Status: He is alert  Psychiatric:         Mood and Affect: Mood normal      In ED:      Patient's photo from 2/5 (yesterday):          Vital Signs  ED Triage Vitals [02/06/23 1958]   Temperature Pulse Respirations Blood Pressure SpO2   98 4 °F (36 9 °C) 105 18 165/97 99 %      Temp Source Heart Rate Source Patient Position - Orthostatic VS BP Location FiO2 (%)   Oral -- -- -- --      Pain Score       --           Vitals:    02/06/23 1958   BP: 165/97   Pulse: 105         Visual Acuity      ED Medications  Medications   lidocaine (LIDODERM) 5 % patch 1 patch (1 patch Topical Medication Applied 2/6/23 2155)   oxyCODONE (ROXICODONE) IR tablet 5 mg (5 mg Oral Given 2/6/23 2047)   ketorolac (TORADOL) injection 15 mg (15 mg Intramuscular Given 2/6/23 2154)       Diagnostic Studies  Results Reviewed     None                 XR knee 4+ vw left injury   ED Interpretation by Eduard Jorge PA-C (02/06 2143)   No obvious fracture or dislocation as interpreted by me                 Procedures  Procedures         ED Course  ED Course as of 02/06/23 2207   [facility] Feb 06, 2023 2141 Case discussed with Dr Estefany Morales  They reviewed XR, agree no acute abnormality  Will see patient in office tomorrow for follow up, suspects patient's tear didn't heal and displaced or patient developed a small parameniscal cyst       Medical Decision Making  The patient is a 41-year-old male presenting to the ED for evaluation of left knee pain, deformity  Patient had recent medial meniscus repair in November  Patient without fever, chills  On exam, patient with hard palpable complex deformity to medial left knee  No overlying skin changes including fluctuance, erythema, warmth  No calf tenderness, edema  no effusion  VItal signs stable, patient afebrile  Distal pulses 2+  X-ray obtained  No obvious fracture, deformity    Case discussed with patient's orthopedist, patient to follow-up in office tomorrow  At the time of discharge, the patient is in no acute distress  I discussed with the patient the diagnosis, treatment plan, follow-up, return precautions, and discharge instructions; they were given the opportunity to ask questions and verbalized understanding  Left knee pain: complicated acute illness or injury  Amount and/or Complexity of Data Reviewed  External Data Reviewed: radiology and notes  Radiology: ordered and independent interpretation performed  Decision-making details documented in ED Course  Discussion of management or test interpretation with external provider(s): Dr Pradeep Purvis (Orthopedics)     Risk  Prescription drug management  Disposition  Final diagnoses:   Left knee pain     Time reflects when diagnosis was documented in both MDM as applicable and the Disposition within this note     Time User Action Codes Description Comment    2/6/2023  9:44 PM Rosa Castillo Add [O34 551] Left knee pain       ED Disposition     ED Disposition   Discharge    Condition   Stable    Date/Time   Mon Feb 6, 2023  9:43 PM    [facility]                 Follow-up Information     Follow up With Specialties Details Why [id number] [facility], MD Orthopedic Surgery   [address] [place] [address]  [phone]            Discharge Medication List as of 2/6/2023  9:46 PM      CONTINUE these medications which have NOT CHANGED    Details   acetaminophen (TYLENOL) 500 mg tablet Take 1 tablet (500 mg total) by mouth every 6 (six) hours, Starting Tue 11/8/2022, Normal      albuterol (Ventolin HFA) 90 mcg/act inhaler Inhale 2 puffs every 6 (six) hours as needed for wheezing, Starting Wed 12/8/2021, Normal      celecoxib (CeleBREX) 200 mg capsule Take 1 capsule (200 mg total) by mouth 2 (two) times a day, Starting Tue 1/24/2023, Normal      cyclobenzaprine (FLEXERIL) 10 mg tablet Take 1 tablet (10 mg total) by mouth 3 (three) times a day as needed for muscle spasms, Starting Tue 12/13/2022, Normal      HYDROcodone-acetaminophen (NORCO) 5-325 mg per tablet Starting Tue 11/8/2022, Historical Med      ibuprofen (MOTRIN) 800 mg tablet Take 1 tablet (800 mg total) by mouth every 6 (six) hours as needed (Pain), Starting Sun 11/20/2022, Normal      Lidocaine (HM Lidocaine Patch) 4 % PTCH Apply 1 patch topically in the morning, Starting Fri 12/23/2022, Normal      lidocaine (LIDODERM) 5 % APPLY 1 PATCH TOPICALLY EVERY MORNING, Historical Med      naloxone (NARCAN) 4 mg/0 1 mL nasal spray Administer 1 spray into a nostril  If no response after 2-3 minutes, give another dose in the other nostril using a new spray , Normal      ondansetron (ZOFRAN) 4 mg tablet Take 1 tablet (4 mg total) by mouth every 8 (eight) hours as needed for nausea or vomiting, Starting Tue 11/8/2022, Normal      oxyCODONE (Roxicodone) 5 immediate release tablet Take 1 tablet (5 mg total) by mouth every 4 (four) hours as needed for severe pain Max Daily Amount: 30 mg, Starting Thu 12/29/2022, Normal      pregabalin (LYRICA) 200 MG capsule Take 1 capsule (200 mg total) by mouth 3 (three) times a day, Starting Tue 1/24/2023, Normal      traMADol (Ultram) 50 mg tablet Take 1 tablet (50 mg total) by mouth every 6 (six) hours as needed for moderate pain or severe pain, Starting Tue 12/27/2022, Normal             No discharge procedures on file      PDMP Review       Value Time User    PDMP Reviewed  Yes 11/11/2022  3:08 PM Munira Torrez MD          ED Provider  Electronically Signed by           Neville Pinedo PA-C  02/06/23 4605

## 2023-02-07 NOTE — TELEPHONE ENCOUNTER
Called and spoke with the patient regarding his new pain and swelling  At time of call he was already checked in to be evaluated in the emergency department  Based on our discussion and the photo provided by the patient, I believe the most likely explanation is a failure of his previous meniscus repair, a new perameniscal cyst, or less likely a prominence near one of the all suture repair implants used to repair his meniscus  Based on our discussion it does not sound like he had a large effusion or other signs of an infection  He is going to stay and be evaluated in the emergency department  I believe this is prudent to make to check for signs of acute infection  I will be available to discuss with team that is evaluating him if necessary  If discharged I will plan on seeing him tomorrow in my clinic for further treatment plan

## 2023-02-07 NOTE — PROGRESS NOTES
Ortho Sports Medicine New Patient Visit     Assesment:   43 y o  male with Left medial meniscus tear s/p repair  Now concerned that repair has failed given his persistent pain and formation of a small parameniscal cyst     Plan: We long discussion about his new swelling and pain  Throughout this whole process we had discussed extensively that there is a chance that his repair would not heal given its location and extent of the tear  We did have 1 MRI after surgery that showed appropriate position of the meniscus without obvious new tear  However over the last few days he has had significant worsening of pain and swelling and now continues to have pain with weightbearing  I am concerned that the tear is not healing  At this point I recommended a left knee arthroscopy to evaluate the previous tear and directly visualize whether or not it is healing  If it is not healing at this point I recommend partial medial meniscectomy  He does also have some swelling on the medial aspect of the knee in the area of a prominent suture from his previous repair  This may be a small parameniscal cyst versus just irritation from the suture  I will remove the suture at the time of surgery as well  Chief Complaint   Patient presents with   • Left Knee - Post-op       History of Present Illness: The patient is a 43 y o  male returning for follow-up of his left medial meniscus tear  He is now nearly 3 months out from medial meniscus repair  He has had a difficult recovery and now has new pain and swelling  He was seen in the emergency department last night with no signs of infection and has no signs of infection today  However he has a large degree of swelling over the medial aspect of the knee and pain with weightbearing and movement        Past Medical, Social and Family History:  Past Medical History:   Diagnosis Date   • Anxiety    • Asthma    • Brain aneurysm    • Chronic pain     back   • Depression      Past Surgical History:   Procedure Laterality Date   • ARTHROSCOPY KNEE Left 11/8/2022    Procedure: Left knee arthroscopy, medial meniscus repair, possible medial meniscectomy;  Surgeon: Wu Stanton MD;  Location: [facility];  Service: Orthopedics   • BACK SURGERY     • LUMBAR DISCECTOMY  2015    & fusion; L5-S1     Allergies   Allergen Reactions   • Shellfish-Derived Products - Food Allergy Anaphylaxis     Current Outpatient Medications on File Prior to Visit   Medication Sig Dispense Refill   • acetaminophen (TYLENOL) 500 mg tablet Take 1 tablet (500 mg total) by mouth every 6 (six) hours 30 tablet 1   • albuterol (Ventolin HFA) 90 mcg/act inhaler Inhale 2 puffs every 6 (six) hours as needed for wheezing 18 g 5   • celecoxib (CeleBREX) 200 mg capsule Take 1 capsule (200 mg total) by mouth 2 (two) times a day 30 capsule 0   • cyclobenzaprine (FLEXERIL) 10 mg tablet Take 1 tablet (10 mg total) by mouth 3 (three) times a day as needed for muscle spasms 30 tablet 1   • HYDROcodone-acetaminophen (NORCO) 5-325 mg per tablet      • ibuprofen (MOTRIN) 800 mg tablet Take 1 tablet (800 mg total) by mouth every 6 (six) hours as needed (Pain) 30 tablet 0   • Lidocaine (HM Lidocaine Patch) 4 % PTCH Apply 1 patch topically in the morning 15 patch 1   • lidocaine (LIDODERM) 5 % APPLY 1 PATCH TOPICALLY EVERY MORNING     • naloxone (NARCAN) 4 mg/0 1 mL nasal spray Administer 1 spray into a nostril  If no response after 2-3 minutes, give another dose in the other nostril using a new spray   1 each 1   • ondansetron (ZOFRAN) 4 mg tablet Take 1 tablet (4 mg total) by mouth every 8 (eight) hours as needed for nausea or vomiting 20 tablet 0   • oxyCODONE (Roxicodone) 5 immediate release tablet Take 1 tablet (5 mg total) by mouth every 4 (four) hours as needed for severe pain Max Daily Amount: 30 mg 25 tablet 0   • pregabalin (LYRICA) 200 MG capsule Take 1 capsule (200 mg total) by mouth 3 (three) times a day 90 capsule 0   • traMADol (Ultram) 50 mg tablet Take 1 tablet (50 mg total) by mouth every 6 (six) hours as needed for moderate pain or severe pain 30 tablet 0     Current Facility-Administered Medications on File Prior to Visit   Medication Dose Route Frequency Provider Last Rate Last Admin   • [COMPLETED] ketorolac (TORADOL) injection 15 mg  15 mg Intramuscular Once [employer], PA-C   15 mg at 02/06/23 2154   • [COMPLETED] oxyCODONE (ROXICODONE) IR tablet 5 mg  5 mg Oral Once [employer], PA-C   5 mg at 02/06/23 2047   • [DISCONTINUED] lidocaine (LIDODERM) 5 % patch 1 patch  1 patch Topical Once [employer] PA-C   1 patch at 02/06/23 2155     Social History     Socioeconomic History   • Marital status: /Civil Union     Spouse name: Not on file   • Number of children: Not on file   • Years of education: Not on file   • Highest education level: Not on file   Occupational History   • Not on file   Tobacco Use   • Smoking status: Every Day     Packs/day: 0 25     Types: Cigarettes   • Smokeless tobacco: Never   Vaping Use   • Vaping Use: Never used   Substance and Sexual Activity   • Alcohol use: No   • Drug use: Not Currently     Types: Marijuana     Comment: patient had medical marijuana    • Sexual activity: Not on file   Other Topics Concern   • Not on file   Social History Narrative    fhx not reviewed intriage     Social Determinants of Health     Financial Resource Strain: Not on file   Food Insecurity: Not on file   Transportation Needs: Not on file   Physical Activity: Not on file   Stress: Not on file   Social Connections: Not on file   Intimate Partner Violence: Not on file   Housing Stability: Not on file         I have reviewed the past medical, surgical, social and family history, medications and allergies as documented in the EMR  Review of systems: ROS is negative other than that noted in the HPI  Constitutional: Negative for fatigue and fever     HENT: Negative for sore throat  Respiratory: Negative for shortness of breath  Cardiovascular: Negative for chest pain  Gastrointestinal: Negative for abdominal pain  Endocrine: Negative for cold intolerance and heat intolerance  Genitourinary: Negative for flank pain  Musculoskeletal: Negative for back pain  Skin: Negative for rash  Allergic/Immunologic: Negative for immunocompromised state  Neurological: Negative for dizziness  Psychiatric/Behavioral: Negative for agitation  Physical Exam:    Blood pressure (!) 148/101, pulse 94, height 5' 8" (1 727 m), weight 71 2 kg (157 lb)  General/Constitutional: NAD, well developed, well nourished  HENT: Normocephalic, atraumatic  CV: Intact distal pulses, regular rate  Resp: No respiratory distress or labored breathing  Lymphatic: No lymphadenopathy palpated  Neuro: Alert and Oriented x 3, no focal deficits  Psych: Normal mood, normal affect  Skin: Warm, dry, no rashes, no erythema      Knee Exam:   2 cm area of focal swelling on the medial aspect of the knee with a palpable suture device from previous meniscus repair  Tender over the medial joint line  Range of motion from 0-130  Positive Gila  Knee is stable to varus stress, valgus stress, Lachman, and posterior drawer      Neurovascularly intact distally    Knee Imaging    X-rays from the emergency room visit were reviewed showing no new fractures or acute findings

## 2023-02-07 NOTE — TELEPHONE ENCOUNTER
Tried to call patient to schedule an appt for today with Dr Hiren Medrano but his phone is not accepting calls at this time  Will try later

## 2023-02-07 NOTE — DISCHARGE INSTRUCTIONS
Follow up with Dr Doris Mancini tomorrow as discussed    Continue with Tylenol, Ibuprofen, Lidoderm for pain as needed    Return for fever, chills, numbness/tingling of leg, color change, or any other new/concerning symptoms

## 2023-02-08 NOTE — PRE-PROCEDURE INSTRUCTIONS
Pre-Surgery Instructions:   Medication Instructions   • acetaminophen (TYLENOL) 500 mg tablet Uses PRN- OK to take day of surgery   • albuterol (Ventolin HFA) 90 mcg/act inhaler Uses PRN- OK to take day of surgery   • celecoxib (CeleBREX) 200 mg capsule Stop taking 3 days prior to surgery  • cyclobenzaprine (FLEXERIL) 10 mg tablet Uses PRN- OK to take day of surgery   • ibuprofen (MOTRIN) 800 mg tablet Stop taking 3 days prior to surgery  • ketorolac (TORADOL) 10 mg tablet Stop taking 3 days prior to surgery  • Lidocaine (HM Lidocaine Patch) 4 % PTCH Hold day of surgery  • lidocaine (LIDODERM) 5 % Hold day of surgery  • naloxone (NARCAN) 4 mg/0 1 mL nasal spray Uses PRN- OK to take day of surgery   • ondansetron (ZOFRAN) 4 mg tablet Uses PRN- OK to take day of surgery   • pregabalin (LYRICA) 200 MG capsule Instructions provided by MD      - Reviewed with patient, in detail, instructions from "My Surgical Experience"  - Instructed to avoid all OTC vitamins/supplements one week prior to surgery and NSAIDS for 3 days prior to surgery per anesthesia guidelines  Tylenol ok to take PRN  - Advised patient nothing eat or drink after midnight prior to surgery, except medications that he/she is to take morning DOS with only small sip of water     - Advised patient that Nima Blancas will call with surgery arrival time and hospital directions the business day prior to surgery  Patient verbalized understanding of current visitor restrictions/masking guidelines and advised that he/she can confirm these at time of arrival call with Nima Blancas      - Patient verbalized understanding and knows to call surgeon's office with any additional questions prior to surgery  Instructed to call surgeon's office in meantime with any new illnesses/exposure, patient verbalized understanding

## 2023-02-09 ENCOUNTER — TELEPHONE (OUTPATIENT)
Dept: OBGYN CLINIC | Facility: HOSPITAL | Age: 43
End: 2023-02-09

## 2023-02-09 NOTE — TELEPHONE ENCOUNTER
Caller: Milla    Doctor/Office:     #: [phone]      What needs to be faxed: Kayla Arroyo notes from 11/8/22 and physical therapy order from 11/11/22    ATTN to: Rehana Saleh    Fax#: [phone]      Documents need to be faxed

## 2023-02-10 ENCOUNTER — TELEPHONE (OUTPATIENT)
Dept: OBGYN CLINIC | Facility: HOSPITAL | Age: 43
End: 2023-02-10

## 2023-02-10 NOTE — TELEPHONE ENCOUNTER
Caller: delano    Doctor: Aruna Rob    Reason for call: Called to confirm patients next appt   Advised 2/24    Call back#: [phone]

## 2023-02-13 ENCOUNTER — ANESTHESIA (OUTPATIENT)
Dept: PERIOP | Facility: HOSPITAL | Age: 43
End: 2023-02-13

## 2023-02-13 ENCOUNTER — HOSPITAL ENCOUNTER (OUTPATIENT)
Facility: HOSPITAL | Age: 43
Setting detail: OUTPATIENT SURGERY
Discharge: HOME | End: 2023-02-13
Attending: ORTHOPAEDIC SURGERY | Admitting: ORTHOPAEDIC SURGERY

## 2023-02-13 VITALS
WEIGHT: 157 LBS | BODY MASS INDEX: 23.79 KG/M2 | RESPIRATION RATE: 18 BRPM | OXYGEN SATURATION: 96 % | SYSTOLIC BLOOD PRESSURE: 143 MMHG | DIASTOLIC BLOOD PRESSURE: 92 MMHG | HEIGHT: 68 IN | HEART RATE: 68 BPM | TEMPERATURE: 98.2 F

## 2023-02-13 DIAGNOSIS — Z47.89 AFTERCARE FOLLOWING SURGERY OF THE MUSCULOSKELETAL SYSTEM: Primary | ICD-10-CM

## 2023-02-13 RX ORDER — OXYCODONE HYDROCHLORIDE 5 MG/1
5 TABLET ORAL EVERY 6 HOURS PRN
Qty: 20 TABLET | Refills: 0 | Status: SHIPPED | OUTPATIENT
Start: 2023-02-13 | End: 2023-02-23

## 2023-02-13 RX ORDER — ONDANSETRON 4 MG/1
4 TABLET, FILM COATED ORAL EVERY 8 HOURS PRN
Qty: 20 TABLET | Refills: 0 | Status: SHIPPED | OUTPATIENT
Start: 2023-02-13

## 2023-02-13 RX ORDER — KETOROLAC TROMETHAMINE 30 MG/ML
INJECTION, SOLUTION INTRAMUSCULAR; INTRAVENOUS AS NEEDED
Status: DISCONTINUED | OUTPATIENT
Start: 2023-02-13 | End: 2023-02-13

## 2023-02-13 RX ORDER — SODIUM CHLORIDE, SODIUM LACTATE, POTASSIUM CHLORIDE, CALCIUM CHLORIDE 600; 310; 30; 20 MG/100ML; MG/100ML; MG/100ML; MG/100ML
INJECTION, SOLUTION INTRAVENOUS CONTINUOUS PRN
Status: DISCONTINUED | OUTPATIENT
Start: 2023-02-13 | End: 2023-02-13

## 2023-02-13 RX ORDER — ONDANSETRON 2 MG/ML
INJECTION INTRAMUSCULAR; INTRAVENOUS AS NEEDED
Status: DISCONTINUED | OUTPATIENT
Start: 2023-02-13 | End: 2023-02-13

## 2023-02-13 RX ORDER — PROPOFOL 10 MG/ML
INJECTION, EMULSION INTRAVENOUS AS NEEDED
Status: DISCONTINUED | OUTPATIENT
Start: 2023-02-13 | End: 2023-02-13

## 2023-02-13 RX ORDER — MIDAZOLAM HYDROCHLORIDE 2 MG/2ML
INJECTION, SOLUTION INTRAMUSCULAR; INTRAVENOUS AS NEEDED
Status: DISCONTINUED | OUTPATIENT
Start: 2023-02-13 | End: 2023-02-13

## 2023-02-13 RX ORDER — ONDANSETRON 2 MG/ML
4 INJECTION INTRAMUSCULAR; INTRAVENOUS EVERY 6 HOURS PRN
Status: DISCONTINUED | OUTPATIENT
Start: 2023-02-13 | End: 2023-02-13 | Stop reason: HOSPADM

## 2023-02-13 RX ORDER — HYDROMORPHONE HCL/PF 1 MG/ML
0.5 SYRINGE (ML) INJECTION
Status: COMPLETED | OUTPATIENT
Start: 2023-02-13 | End: 2023-02-13

## 2023-02-13 RX ORDER — OXYCODONE HYDROCHLORIDE 5 MG/1
5 TABLET ORAL EVERY 4 HOURS PRN
Status: DISCONTINUED | OUTPATIENT
Start: 2023-02-13 | End: 2023-02-13 | Stop reason: HOSPADM

## 2023-02-13 RX ORDER — LIDOCAINE HYDROCHLORIDE 10 MG/ML
INJECTION, SOLUTION EPIDURAL; INFILTRATION; INTRACAUDAL; PERINEURAL AS NEEDED
Status: DISCONTINUED | OUTPATIENT
Start: 2023-02-13 | End: 2023-02-13

## 2023-02-13 RX ORDER — BUPIVACAINE HYDROCHLORIDE 2.5 MG/ML
INJECTION, SOLUTION EPIDURAL; INFILTRATION; INTRACAUDAL AS NEEDED
Status: DISCONTINUED | OUTPATIENT
Start: 2023-02-13 | End: 2023-02-13 | Stop reason: HOSPADM

## 2023-02-13 RX ORDER — NAPROXEN 500 MG/1
500 TABLET ORAL 2 TIMES DAILY WITH MEALS
Qty: 20 TABLET | Refills: 0 | Status: SHIPPED | OUTPATIENT
Start: 2023-02-13

## 2023-02-13 RX ORDER — SODIUM CHLORIDE, SODIUM LACTATE, POTASSIUM CHLORIDE, CALCIUM CHLORIDE 600; 310; 30; 20 MG/100ML; MG/100ML; MG/100ML; MG/100ML
100 INJECTION, SOLUTION INTRAVENOUS CONTINUOUS
Status: DISCONTINUED | OUTPATIENT
Start: 2023-02-13 | End: 2023-02-13 | Stop reason: HOSPADM

## 2023-02-13 RX ORDER — BUPIVACAINE HYDROCHLORIDE 5 MG/ML
INJECTION, SOLUTION EPIDURAL; INTRACAUDAL AS NEEDED
Status: DISCONTINUED | OUTPATIENT
Start: 2023-02-13 | End: 2023-02-13

## 2023-02-13 RX ORDER — MAGNESIUM HYDROXIDE 1200 MG/15ML
LIQUID ORAL AS NEEDED
Status: DISCONTINUED | OUTPATIENT
Start: 2023-02-13 | End: 2023-02-13 | Stop reason: HOSPADM

## 2023-02-13 RX ORDER — CHLORHEXIDINE GLUCONATE 0.12 MG/ML
15 RINSE ORAL ONCE
Status: COMPLETED | OUTPATIENT
Start: 2023-02-13 | End: 2023-02-13

## 2023-02-13 RX ORDER — DEXAMETHASONE SODIUM PHOSPHATE 10 MG/ML
INJECTION, SOLUTION INTRAMUSCULAR; INTRAVENOUS AS NEEDED
Status: DISCONTINUED | OUTPATIENT
Start: 2023-02-13 | End: 2023-02-13

## 2023-02-13 RX ORDER — FENTANYL CITRATE 50 UG/ML
INJECTION, SOLUTION INTRAMUSCULAR; INTRAVENOUS AS NEEDED
Status: DISCONTINUED | OUTPATIENT
Start: 2023-02-13 | End: 2023-02-13

## 2023-02-13 RX ORDER — CEFAZOLIN SODIUM 2 G/50ML
2000 SOLUTION INTRAVENOUS ONCE
Status: COMPLETED | OUTPATIENT
Start: 2023-02-13 | End: 2023-02-13

## 2023-02-13 RX ORDER — FENTANYL CITRATE/PF 50 MCG/ML
50 SYRINGE (ML) INJECTION
Status: COMPLETED | OUTPATIENT
Start: 2023-02-13 | End: 2023-02-13

## 2023-02-13 RX ORDER — ACETAMINOPHEN 500 MG
1000 TABLET ORAL EVERY 8 HOURS
Qty: 60 TABLET | Refills: 0 | Status: SHIPPED | OUTPATIENT
Start: 2023-02-13

## 2023-02-13 RX ORDER — ACETAMINOPHEN 325 MG/1
650 TABLET ORAL EVERY 6 HOURS PRN
Status: DISCONTINUED | OUTPATIENT
Start: 2023-02-13 | End: 2023-02-13 | Stop reason: HOSPADM

## 2023-02-13 RX ADMIN — HYDROMORPHONE HYDROCHLORIDE 0.5 MG: 1 INJECTION, SOLUTION INTRAMUSCULAR; INTRAVENOUS; SUBCUTANEOUS at 09:11

## 2023-02-13 RX ADMIN — OXYCODONE HYDROCHLORIDE 5 MG: 5 TABLET ORAL at 10:14

## 2023-02-13 RX ADMIN — SODIUM CHLORIDE, SODIUM LACTATE, POTASSIUM CHLORIDE, AND CALCIUM CHLORIDE: .6; .31; .03; .02 INJECTION, SOLUTION INTRAVENOUS at 06:55

## 2023-02-13 RX ADMIN — BUPIVACAINE HYDROCHLORIDE 10 ML: 5 INJECTION, SOLUTION EPIDURAL; INTRACAUDAL; PERINEURAL at 07:19

## 2023-02-13 RX ADMIN — CHLORHEXIDINE GLUCONATE 0.12% ORAL RINSE 15 ML: 1.2 LIQUID ORAL at 06:31

## 2023-02-13 RX ADMIN — FENTANYL CITRATE 100 MCG: 50 INJECTION INTRAMUSCULAR; INTRAVENOUS at 07:48

## 2023-02-13 RX ADMIN — LIDOCAINE HYDROCHLORIDE 50 MG: 10 INJECTION, SOLUTION EPIDURAL; INFILTRATION; INTRACAUDAL; PERINEURAL at 07:27

## 2023-02-13 RX ADMIN — DEXAMETHASONE SODIUM PHOSPHATE 10 MG: 10 INJECTION INTRAMUSCULAR; INTRAVENOUS at 07:34

## 2023-02-13 RX ADMIN — KETOROLAC TROMETHAMINE 30 MG: 30 INJECTION, SOLUTION INTRAMUSCULAR; INTRAVENOUS at 08:10

## 2023-02-13 RX ADMIN — ACETAMINOPHEN 650 MG: 325 TABLET ORAL at 10:57

## 2023-02-13 RX ADMIN — HYDROMORPHONE HYDROCHLORIDE 0.5 MG: 1 INJECTION, SOLUTION INTRAMUSCULAR; INTRAVENOUS; SUBCUTANEOUS at 09:06

## 2023-02-13 RX ADMIN — FENTANYL CITRATE 100 MCG: 50 INJECTION INTRAMUSCULAR; INTRAVENOUS at 07:15

## 2023-02-13 RX ADMIN — FENTANYL CITRATE 50 MCG: 50 INJECTION INTRAMUSCULAR; INTRAVENOUS at 08:53

## 2023-02-13 RX ADMIN — CEFAZOLIN SODIUM 2000 MG: 2 SOLUTION INTRAVENOUS at 07:15

## 2023-02-13 RX ADMIN — PROPOFOL 200 MG: 10 INJECTION, EMULSION INTRAVENOUS at 07:27

## 2023-02-13 RX ADMIN — ONDANSETRON 4 MG: 2 INJECTION INTRAMUSCULAR; INTRAVENOUS at 07:34

## 2023-02-13 RX ADMIN — FENTANYL CITRATE 50 MCG: 50 INJECTION INTRAMUSCULAR; INTRAVENOUS at 08:58

## 2023-02-13 RX ADMIN — MIDAZOLAM HYDROCHLORIDE 2 MG: 1 INJECTION, SOLUTION INTRAMUSCULAR; INTRAVENOUS at 07:15

## 2023-02-13 RX ADMIN — BUPIVACAINE 20 ML: 13.3 INJECTION, SUSPENSION, LIPOSOMAL INFILTRATION at 07:19

## 2023-02-13 NOTE — ANESTHESIA POSTPROCEDURE EVALUATION
Post-Op Assessment Note    CV Status:  Stable  Pain Score: 0    Pain management: adequate     Mental Status:  Alert   Hydration Status:  Stable   PONV Controlled:  Controlled   Airway Patency:  Patent      Post Op Vitals Reviewed: Yes      Staff: CRNA         No notable events documented      BP  138/80   Temp  97 6    Pulse  71   Resp   20   SpO2   95

## 2023-02-13 NOTE — DISCHARGE INSTR - AVS FIRST PAGE
Postoperative Instructions Following Knee Surgery    MEDICATIONS:  Resume all home medications unless otherwise instructed by your surgeon  Pain Medication:   Take Tylenol and Naproxen on prescribed schedule for 7 days  Take Oxycodone as needed for severe pain   If you were given a regional anesthetic (nerve block), it is helpful to take your pain medication before the block wear off  Possible side effects include nausea, constipation, and urinary retention  If you experience these side effects, please call our office for assistance  Pain med refills are authorized only during office hours (8am-4pm, Mon-Fri)  Nausea Medication:   Zofran 4mg, take 1 tablet every 6 hours as needed for nausea or vomiting   Fill prescription ONLY if you expericnce severe nausea  Blood Clot Prevention:   Pump your foot up and down 20 times per hour while you are less mobile  Ambulate with your crutches at least once every hour     WOUND CARE:  Keep the dressing clean and dry  Light drainage may occur the first 2 days postop  You may remove the dressings and get the incision wet in the shower 72 hours after surgery  DO NOT remove steri-strips or sutures  DO NOT immerse the incision under water  Carefully pat the incision dry  If there is wound drainage, re-apply a fresh dry gauze dressing  Please call our office ([phone]) if you experience either of the following:  Sudden increase in swelling, redness, or warmth at the surgical site  Excessive incisional drainage that persists beyond the 3rd day after surgery  Oral temperature greater than 101 degrees, not relieved with Tylenol  Shortness of breath, chest pain, nausea, or any other concerning symptoms    Other pain/swelling control measures:  Cold Therapy:  Apply ice (20 min on, 20 min off) as often as you feel is necessary  Ice helps with pain  Elevation:  Elevate the entire leg above heart level  Place pillows under your ankle to keep your knee straight   This will help with swelling and prevents stiffness   Compression:  Keep an ace wrap on the operative leg until you return to the office  It may be removed to shower as described above  RANGE OF MOTION:  You are allowed FULL RANGE OF MOTION as tolerated  IMMOBILIZATION:  None  You are allowed full range of motion as tolerated  ACTIVITY:   BEAR FULL WEIGHT AS TOLERATED on the operative leg  Use crutches to assist only as needed  Using Crutches on Stairs:  Going up, lead with your "good" (nonoperative) leg  Going down, lead with your "bad" (operative) leg  Use a hand rail when available  Knee Extension:  Place a rolled towel or pillow under your ankle for 20-30 minutes 3-5 times per day  This will help to maintain full knee extension  Quad Sets:  Sit or lie with your knee straight  Tighten your quadriceps (front thigh) muscle  Hold for 3 seconds, then relax  Repeat 20 times per hour while awake  PHYSICAL THERAPY:  Begin therapy on 1-3 DAYS AFTER SURGERY  You were given a prescription for therapy at your preoperative office visit  If you do not have physical therapy scheduled yet, please call our office for assistance      FOLLOW-UP APPOINTMENT:  10-14 days after surgery with:    Dr Jasmeet Mendez MD    [address]  [address] [address]  [place], [place], [address] [place] [address], [facility]    Phone:   [phone]

## 2023-02-13 NOTE — ANESTHESIA PREPROCEDURE EVALUATION
Procedure:  Left Knee Arthroscopy, Partial Medial Meniscectomy, (Left: Knee)    Relevant Problems   MUSCULOSKELETAL   (+) Bilateral low back pain with sciatica   (+) Chronic back pain greater than 3 months duration   (+) DDD (degenerative disc disease), cervical   (+) DDD (degenerative disc disease), lumbosacral   (+) Lumbosacral spondylosis      NEURO/PSYCH   (+) Chronic back pain greater than 3 months duration      PULMONARY   (+) Asthma with acute exacerbation   (+) Chronic obstructive pulmonary disease (HCC)   (+) Mild intermittent asthma without complication      Plan for Left Adductor Canal Block with Exparel followed by general anesthesia with LMA  Risks and benefits of block explained to patient  Patient and surgeon in agreement with the above plan  Physical Exam    Airway    Mallampati score: II  TM Distance: >3 FB  Neck ROM: full     Dental       Cardiovascular      Pulmonary      Other Findings        Anesthesia Plan  ASA Score- 2     Anesthesia Type- general with ASA Monitors  Additional Monitors:   Airway Plan: LMA  Plan Factors-Exercise tolerance (METS): <4 METS  Exercise comment: Limited by knee pain  Chart reviewed  Patient summary reviewed  Patient is a current smoker  Patient did not smoke on day of surgery  Induction- intravenous  Postoperative Plan- Plan for postoperative opioid use  Informed Consent- Anesthetic plan and risks discussed with patient  I personally reviewed this patient with the CRNA  Discussed and agreed on the Anesthesia Plan with the CRNA  Torito Smith

## 2023-02-13 NOTE — PROGRESS NOTES
Left Adductor Canal Block:    Time out preformed  Patient and consent verified  Laterality verified  Patient sedated on standard ASA monitors  Patient prepped in sterile fashion  Needle advanced under ultrasound guidance  30 mL of a mixture of 20cc Exparel and 10 ml of 0 5% Bupivacaine injected after negative aspiration in 5 cc increments for adductor canal block  Image stored  No complications apparent  VSS

## 2023-02-13 NOTE — H&P
H&P reviewed  After examining the patient I find no changes in the patients condition since the H&P had been written  Vitals:    02/13/23 0613   BP: 146/98   Pulse: 73   Resp: 20   Temp: 97 8 °F (36 6 °C)   SpO2: 100%       Ortho Sports Medicine Follow Up Patient Visit     Assesment:   43 y o  male with Left medial meniscus tear s/p repair  Now concerned that repair has failed given his persistent pain and formation of a small parameniscal cyst     Plan: We long discussion about his new swelling and pain  Throughout this whole process we had discussed extensively that there is a chance that his repair would not heal given its location and extent of the tear  We did have 1 MRI after surgery that showed appropriate position of the meniscus without obvious new tear  However over the last few days he has had significant worsening of pain and swelling and now continues to have pain with weightbearing  I am concerned that the tear is not healing  At this point I recommended a left knee arthroscopy to evaluate the previous tear and directly visualize whether or not it is healing  If it is not healing at this point I recommend partial medial meniscectomy  He does also have some swelling on the medial aspect of the knee in the area of a prominent suture from his previous repair  This may be a small parameniscal cyst versus just irritation from the suture  I will remove the suture at the time of surgery as well  No chief complaint on file  History of Present Illness: The patient is a 43 y o  male returning for follow-up of his left medial meniscus tear  He is now nearly 3 months out from medial meniscus repair  He has had a difficult recovery and now has new pain and swelling  He was seen in the emergency department last night with no signs of infection and has no signs of infection today    However he has a large degree of swelling over the medial aspect of the knee and pain with weightbearing and movement  Past Medical, Social and Family History:  Past Medical History:   Diagnosis Date   • Anxiety    • Asthma    • Brain aneurysm    • Chronic pain     back   • COVID-19 2020   • Depression    • GERD (gastroesophageal reflux disease)      Past Surgical History:   Procedure Laterality Date   • ARTHROSCOPY KNEE Left 11/8/2022    Procedure: Left knee arthroscopy, medial meniscus repair, possible medial meniscectomy;  Surgeon: Sindy Braxton MD;  Location: [facility];  Service: Orthopedics   • BACK SURGERY     • LUMBAR DISCECTOMY  2015    & fusion; L5-S1     Allergies   Allergen Reactions   • Shellfish-Derived Products - Food Allergy Anaphylaxis     No current facility-administered medications on file prior to encounter  Current Outpatient Medications on File Prior to Encounter   Medication Sig Dispense Refill   • acetaminophen (TYLENOL) 500 mg tablet Take 1 tablet (500 mg total) by mouth every 6 (six) hours 30 tablet 1   • albuterol (Ventolin HFA) 90 mcg/act inhaler Inhale 2 puffs every 6 (six) hours as needed for wheezing 18 g 5   • celecoxib (CeleBREX) 200 mg capsule Take 1 capsule (200 mg total) by mouth 2 (two) times a day 30 capsule 0   • ibuprofen (MOTRIN) 800 mg tablet Take 1 tablet (800 mg total) by mouth every 6 (six) hours as needed (Pain) 30 tablet 0   • Lidocaine (HM Lidocaine Patch) 4 % PTCH Apply 1 patch topically in the morning 15 patch 1   • lidocaine (LIDODERM) 5 % APPLY 1 PATCH TOPICALLY EVERY MORNING     • naloxone (NARCAN) 4 mg/0 1 mL nasal spray Administer 1 spray into a nostril  If no response after 2-3 minutes, give another dose in the other nostril using a new spray   1 each 1   • ondansetron (ZOFRAN) 4 mg tablet Take 1 tablet (4 mg total) by mouth every 8 (eight) hours as needed for nausea or vomiting 20 tablet 0   • pregabalin (LYRICA) 200 MG capsule Take 1 capsule (200 mg total) by mouth 3 (three) times a day 90 capsule 0   • HYDROcodone-acetaminophen (NORCO) 5-325 mg per tablet  (Patient not taking: Reported on 2/8/2023)     • oxyCODONE (Roxicodone) 5 immediate release tablet Take 1 tablet (5 mg total) by mouth every 4 (four) hours as needed for severe pain Max Daily Amount: 30 mg (Patient not taking: Reported on 2/8/2023) 25 tablet 0   • traMADol (Ultram) 50 mg tablet Take 1 tablet (50 mg total) by mouth every 6 (six) hours as needed for moderate pain or severe pain (Patient not taking: Reported on 2/8/2023) 30 tablet 0     Social History     Socioeconomic History   • Marital status: /Civil Union     Spouse name: Not on file   • Number of children: Not on file   • Years of education: Not on file   • Highest education level: Not on file   Occupational History   • Not on file   Tobacco Use   • Smoking status: Every Day     Packs/day: 0 25     Types: Cigarettes   • Smokeless tobacco: Never   Vaping Use   • Vaping Use: Never used   Substance and Sexual Activity   • Alcohol use: No   • Drug use: Not Currently     Types: Marijuana     Comment: patient had medical marijuana    • Sexual activity: Not on file   Other Topics Concern   • Not on file   Social History Narrative    fhx not reviewed intriage     Social Determinants of Health     Financial Resource Strain: Not on file   Food Insecurity: Not on file   Transportation Needs: Not on file   Physical Activity: Not on file   Stress: Not on file   Social Connections: Not on file   Intimate Partner Violence: Not on file   Housing Stability: Not on file         I have reviewed the past medical, surgical, social and family history, medications and allergies as documented in the EMR  Review of systems: ROS is negative other than that noted in the HPI  Constitutional: Negative for fatigue and fever  HENT: Negative for sore throat  Respiratory: Negative for shortness of breath  Cardiovascular: Negative for chest pain  Gastrointestinal: Negative for abdominal pain     Endocrine: Negative for cold intolerance and heat intolerance  Genitourinary: Negative for flank pain  Musculoskeletal: Negative for back pain  Skin: Negative for rash  Allergic/Immunologic: Negative for immunocompromised state  Neurological: Negative for dizziness  Psychiatric/Behavioral: Negative for agitation  Physical Exam:    Blood pressure 146/98, pulse 73, temperature 97 8 °F (36 6 °C), temperature source Temporal, resp  rate 20, height 5' 8" (1 727 m), weight 71 2 kg (157 lb), SpO2 100 %  General/Constitutional: NAD, well developed, well nourished  HENT: Normocephalic, atraumatic  CV: Intact distal pulses, regular rate  Resp: No respiratory distress or labored breathing  Lymphatic: No lymphadenopathy palpated  Abdomen: Soft nontender nondistended  Neuro: Alert and Oriented x 3, no focal deficits  Psych: Normal mood, normal affect  Skin: Warm, dry, no rashes, no erythema      Knee Exam:   2 cm area of focal swelling on the medial aspect of the knee with a palpable suture device from previous meniscus repair  Tender over the medial joint line  Range of motion from 0-130  Positive Gila  Knee is stable to varus stress, valgus stress, Lachman, and posterior drawer      Neurovascularly intact distally    Knee Imaging    X-rays from the emergency room visit were reviewed showing no new fractures or acute findings

## 2023-02-13 NOTE — NURSING NOTE
Pt is awake,alert,tolerated diet, written and verbal instructions given to pt, who verbalizes an understanding  Left foot has (+) pedal pulses, (+) Neuromotor sensation, Pt has crutches and knows how to use them, immobilizer in place  Voided QS

## 2023-02-13 NOTE — OP NOTE
OPERATIVE REPORT  PATIENT NAME: Saurabh Kumar    :  1980  MRN: [id number]  Pt Location:  OR ROOM 10    SURGERY DATE: 2023    Surgeon(s) and Role:     * Martina Humphrey MD - Primary     * Rex Felipe PA-C - Assisting    The presence of [place] Busby PA-C was required for poisitioning, retraction, assistance, and closure  No qualified resident was available  Preop Diagnosis:  1  Left knee medial meniscus tear  2  Failure of prior meniscus repair     Post op Diagnosis:   Same as preop     Procedure(s):  Left - Left Knee Arthroscopy  Partial Medial Meniscectomy  Removal of all suture meniscal repair device  Specimen(s):  * No specimens in log *    Estimated Blood Loss:   Minimal    Drains:  * No LDAs found *    Anesthesia Type:   General w/ Regional    Operative Indications:  44 yo male who underwent a medial meniscus repair 3 months ago  He had persistent pain throughout his postoperative course  Over the last 2 weeks there was worsening of pain, difficulty bearing weight, and new swelling  Exam findings were consistent with a meniscus tear and soft tissue irritation from a previously placed all suture meniscal repair device  At this point the patient was not progressing through PT and had not returned to activity as expected  I recommended Left knee arthroscopy with evaluation of the previous repair and likely meniscectomy, as well as removal of the suture device that was causing symptoms  Risks benefits and alternatives were discussed  He expressed understanding and elected to proceed  Complications:   None    Findings:      Arthroscopic evaluation of the Left knee revealed the following:     Medial meniscus: Previously repaired meniscus tissue remained well reduced with suture devices in good position  However there was no healing of the tissue and the previous tear was easily entered with a probe     Medial femoral condyle: Grade 1 changes   Medial tibial plateau: Grade 1 changes   Anterior cruciate ligament: Normal appearance  Posterior cruciate ligament: Normal appearance  Lateral meniscus: normal   Lateral femoral condyle: normal   Lateral tibial plateau: normal   Medial and lateral gutters: No loose bodies  Patella: normal   Trochlea: central grade 1 changes        Procedure: In the pre-operative holding area, the patient identified the correct operative extremity and I marked that extremity with my initials  The patient was then brought to the operating room and positioned supine  Following satisfactory induction of anesthesia, the left knee was prepped and draped in the usual sterile fashion for surgical arthroscopy of the left knee  Before any surgical instrumentation was passed to me by the surgical technician, a formalized time-out occurred, which involves the surgeon, circulating nurse, and anesthesia staff all verifying the correct operative extremity  My initials were visible on the prepped and draped operative field  The anatomic landmarks of the anteromedial and anterolateral portals were marked and these portal sites  The anterolateral portal was established with a #11 blade  The arthroscope was introduced through this portal  Under direct visualization, the anteromedial portal was established with a localizing needle followed by a scalpel  A probe was then introduced into the anteromedial portal  A systematic diagnostic arthroscopy evaluated the following:  medial compartment, notch, lateral compartment, patellofemoral compartment, medial gutter, and lateral gutter  The medial meniscus repair site was evaluated and found to be well reduced but with no evidence of healing  The central meniscal tissue was friable and easily displaced with the probe  I opted to perform a partial medial meniscectomy, removing the previous bucket handle fragment   Using a combination of arthroscopic biters and shaver I debrided the unhealed tear as well as 3 all inside suture repair devices from the previous surgery  Additional devices were found to be not visible or entirely encased in scar tissue and were left in place  A the conclusion of this step, the torn meniscal tissue was completely excised to a stable peripheral rim of meniscal tissue  Hemostasis was ensured using an arthroscopic wand  All particulate debris was removed  The knee was copiously rinsed and then drained  The portals were closed with an interrupted 3-0 Nylon  After conclusion of the arthroscopic procedure I turned my attention to the meniscal repair device that was palpable on the medial soft tissues and was causing symptoms  The device as well as a small surrounding cyst was easily palpable  I made an incision directly over the device  Dissection was carried through skin and subcutaneous tissue  There was a small cyst around the device which was opened  The all suture device was easily removed  There no not purulence or evidence of infection  There was a stalk communicating with the joint that was closed with vicryl suture  The wound was irrigated and closed in layered fashion with vicryl then Nylon  The skin was cleansed with sterile saline and dried before Steri-Strips were applied  Finally, a sterile dressing was secured by Webril and an Ace wrap, followed by a hinged knee brace locked in extension          SIGNATURE: Jessica Doyle MD  DATE: February 13, 2023  TIME: 8:39 AM

## 2023-02-14 ENCOUNTER — TELEPHONE (OUTPATIENT)
Dept: OBGYN CLINIC | Facility: HOSPITAL | Age: 43
End: 2023-02-14

## 2023-02-14 NOTE — TELEPHONE ENCOUNTER
Caller: Martha    Doctor/Office: Azael    CB#:       What needs to be faxed: 2/7/23 ov note    ATTN to: Mery Ford    Fax#: [phone]      Documents were successfully e-faxed

## 2023-02-14 NOTE — TELEPHONE ENCOUNTER
Caller: Patient    Doctor: Dr Aleyda Lewis    Reason for call: Grace Martinez from [facility] calling asking if there are any protocols for previous surgery from 11/8/23 and if a new protocol is necessary for surgery performed 2/13/23  Protocols can be faxed to number below      Fax: [phone]    Call back#:

## 2023-02-16 NOTE — TELEPHONE ENCOUNTER
Caller: Antonia(Albert)    Doctor: Maria Esther Hebert    Reason for call: Requesting OVN from 2/7 and 2/13 to be faxed over    Shadia Osborne    Fax#: [phone]

## 2023-02-21 ENCOUNTER — TELEPHONE (OUTPATIENT)
Dept: OBGYN CLINIC | Facility: HOSPITAL | Age: 43
End: 2023-02-21

## 2023-02-21 NOTE — TELEPHONE ENCOUNTER
Caller: Viviana Miller from [facility]: Mee Patricia    Reason for call: electronically faxed 2/7 OVN to fax # [phone]

## 2023-02-24 ENCOUNTER — OFFICE VISIT (OUTPATIENT)
Dept: OBGYN CLINIC | Facility: CLINIC | Age: 43
End: 2023-02-24

## 2023-02-24 VITALS
BODY MASS INDEX: 23.79 KG/M2 | DIASTOLIC BLOOD PRESSURE: 90 MMHG | HEART RATE: 99 BPM | WEIGHT: 157 LBS | HEIGHT: 68 IN | SYSTOLIC BLOOD PRESSURE: 150 MMHG

## 2023-02-24 DIAGNOSIS — Z98.890 S/P MEDIAL MENISCECTOMY OF LEFT KNEE: Primary | ICD-10-CM

## 2023-02-24 RX ORDER — CELECOXIB 100 MG/1
CAPSULE ORAL
COMMUNITY
Start: 2023-02-17

## 2023-02-24 RX ORDER — ESZOPICLONE 1 MG/1
1-2 TABLET, FILM COATED ORAL
COMMUNITY
Start: 2023-02-21

## 2023-02-24 NOTE — PROGRESS NOTES
SUBJECTIVE:  Patient is a 43y o  year old male who presents for follow up now Day 11 s/p Left Knee Arthroscopy, Partial Medial Meniscectomy, Removal of meniscal repair device  - Left performed on  2/13/2023  Today patient has some pain, but is much improved compared to before surgery  He denies any new swelling or numbness/tingling The patient has been consistently attending physical therapy, but notes increased pain following sessions  The patient reports he is only using Tylenol for pain control  The patient has been wearing the knee brace and using crutches, although, he feels he no longer needs to anymore  VITALS:  Vitals:    02/24/23 1437   BP: 150/90   Pulse: 99       PHYSICAL EXAMINATION:  General: well developed and well nourished, alert, oriented times 3 and appears comfortable  Psychiatric: Normal    MUSCULOSKELETAL EXAMINATION:    Incision: Clean, dry, and intact  Sutures removed and Steri strips replaced today  Range of Motion: full extension to full flexion without pain  Neurovascular status: intact      PLAN:    We reviewed operative imaging and reviewed the procedure in detail  I believe the patient is progressing appropriately  I recommended the following weight bearing status: as tolerated without restrictions  The patient can discontinue using the crutches and knee brace  Continue physical therapy per provided protocol with focus on stationary biking/ellipitcal, lunges, and wall sits  Continue over-the-counter medications as needed for pain control  Follow up in 4 weeks for 6-week post-op visit

## 2023-02-27 ENCOUNTER — TELEPHONE (OUTPATIENT)
Dept: OBGYN CLINIC | Facility: CLINIC | Age: 43
End: 2023-02-27

## 2023-02-27 NOTE — TELEPHONE ENCOUNTER
Caller: Dave guillen/ Luis     Doctor/Office: Dr Addison // Ravi BARRY#: n/a       What needs to be faxed: germán from 2/24/23     ATTN to: Aga MIGUEL     Fax#: [phone]      Documents were successfully e-faxed

## 2023-03-01 ENCOUNTER — TELEPHONE (OUTPATIENT)
Dept: OBGYN CLINIC | Facility: HOSPITAL | Age: 43
End: 2023-03-01

## 2023-03-01 NOTE — TELEPHONE ENCOUNTER
Caller: Jamey Riley    Doctor: Loretta Marion    Reason for call: re-faxed office note again to: [phone]    Call back#: [phone]

## 2023-03-10 DIAGNOSIS — Z47.89 AFTERCARE FOLLOWING SURGERY OF THE MUSCULOSKELETAL SYSTEM: ICD-10-CM

## 2023-03-15 RX ORDER — NAPROXEN 500 MG/1
TABLET ORAL
Qty: 20 TABLET | Refills: 0 | Status: SHIPPED | OUTPATIENT
Start: 2023-03-15

## 2023-03-27 ENCOUNTER — TELEPHONE (OUTPATIENT)
Dept: OBGYN CLINIC | Facility: HOSPITAL | Age: 43
End: 2023-03-27

## 2023-03-27 ENCOUNTER — OFFICE VISIT (OUTPATIENT)
Dept: OBGYN CLINIC | Facility: CLINIC | Age: 43
End: 2023-03-27

## 2023-03-27 VITALS
HEIGHT: 68 IN | HEART RATE: 85 BPM | SYSTOLIC BLOOD PRESSURE: 128 MMHG | WEIGHT: 156.53 LBS | BODY MASS INDEX: 23.72 KG/M2 | DIASTOLIC BLOOD PRESSURE: 90 MMHG

## 2023-03-27 DIAGNOSIS — Z98.890 S/P MEDIAL MENISCECTOMY OF LEFT KNEE: Primary | ICD-10-CM

## 2023-03-27 RX ORDER — OXYCODONE HYDROCHLORIDE 5 MG/1
TABLET ORAL
COMMUNITY
Start: 2023-03-17

## 2023-03-27 NOTE — PROGRESS NOTES
SUBJECTIVE:  Patient is a 43y o  year old male who presents for follow up now 6 weeks s/p Left Knee Arthroscopy, Partial Medial Meniscectomy, Removal of meniscal repair device  - Left performed on  2/13/2023  Pain continues to improve  He is doing physical therapy and wearing a new brace that he feels more confident  Still gets some mild medial joint line pain on occasion  No locking  Still uses a cane for assistance when he is walking long distances  VITALS:  Vitals:    03/27/23 0817   BP: 128/90   Pulse: 85       PHYSICAL EXAMINATION:  General: well developed and well nourished, alert, oriented times 3 and appears comfortable  Psychiatric: Normal    MUSCULOSKELETAL EXAMINATION:    Incision: Well-healed incisions  No effusion  0 to 130 degrees of flexion  No significant medial joint line tenderness  Knee stable to valgus, varus, Lachman, posterior drawer  NVI distally      PLAN:    Beth Pierre continues to progress well after meniscectomy  I recommend he continue physical therapy per protocol  He is getting closer to being able to return to his work  However he still needs a cane on occasion and has significant quad atrophy and weakness  I believe these need to improve prior to him returning to work  I will see him back in 1 month for repeat exam and possible return to work at that time  He is now seeing pain management reports that he is getting narcotic prescriptions from them  I again expressed that I do not believe that this is his best source of pain control in the setting of post meniscectomy knee with mild arthritis  I recommend he continue to focus on NSAIDs, ice, activity modification, and bracing for pain relief in this situation rather than narcotics  He agreed and states that he wishes to stop taking them altogether in the near future

## 2023-03-27 NOTE — TELEPHONE ENCOUNTER
Caller: Lissett Herbert with [facility]    Doctor/Office: Mayra/Patricio Arroyo    CB#: n/a      What needs to be faxed: office notes from 3/27    ATTN to: Lissett Herbert    Fax#: [phone]      Documents were successfully e-faxed

## 2023-03-30 ENCOUNTER — TELEPHONE (OUTPATIENT)
Dept: OBGYN CLINIC | Facility: HOSPITAL | Age: 43
End: 2023-03-30

## 2023-03-30 NOTE — TELEPHONE ENCOUNTER
Caller: Codey Maradiaga [address]    Doctor: Kiran Guan    Reason for call: fax over office visit notes from 03/27 to: Fax: [phone]    Call back#: [phone]

## 2023-04-24 ENCOUNTER — OFFICE VISIT (OUTPATIENT)
Dept: OBGYN CLINIC | Facility: CLINIC | Age: 43
End: 2023-04-24

## 2023-04-24 VITALS
BODY MASS INDEX: 23.8 KG/M2 | HEIGHT: 68 IN | SYSTOLIC BLOOD PRESSURE: 147 MMHG | HEART RATE: 85 BPM | DIASTOLIC BLOOD PRESSURE: 89 MMHG

## 2023-04-24 DIAGNOSIS — Z98.890 S/P MEDIAL MENISCECTOMY OF LEFT KNEE: Primary | ICD-10-CM

## 2023-04-24 DIAGNOSIS — G89.29 CHRONIC BILATERAL LOW BACK PAIN WITHOUT SCIATICA: ICD-10-CM

## 2023-04-24 DIAGNOSIS — M54.50 CHRONIC BILATERAL LOW BACK PAIN WITHOUT SCIATICA: ICD-10-CM

## 2023-04-24 DIAGNOSIS — S83.232D COMPLEX TEAR OF MEDIAL MENISCUS OF LEFT KNEE AS CURRENT INJURY, SUBSEQUENT ENCOUNTER: ICD-10-CM

## 2023-04-24 RX ORDER — LIDOCAINE 4 G/G
1 PATCH TOPICAL DAILY
Qty: 15 PATCH | Refills: 1 | Status: SHIPPED | OUTPATIENT
Start: 2023-04-24

## 2023-04-24 RX ORDER — CELECOXIB 100 MG/1
CAPSULE ORAL
COMMUNITY
Start: 2023-04-11

## 2023-04-24 NOTE — LETTER
April 24, 2023     Patient: Alexis Jackson  YOB: 1980  Date of Visit: 4/24/2023      To Whom it May Concern:    Lory Alves is under my professional care  Pablito Aguirre was seen in my office on 4/24/2023  Pablito Castros is cleared to return to light duty only  He cannot lift, push, or pull greater than 20 pounds  He should not be on his feet for more than 15 minutes  He will be fitted for a new brace and will follow up in 4 weeks  I anticipate return to full duty after the next visit  If you have any questions or concerns, please don't hesitate to call           Sincerely,          Avni Amaya MD

## 2023-04-24 NOTE — PROGRESS NOTES
SUBJECTIVE:  Patient is a 37y o  year old male who presents for follow up now 10 weeks s/p Left Knee Arthroscopy, Partial Medial Meniscectomy, Removal of meniscal repair device  - Left performed on  2/13/2023  Pain continues to improve  Still has medial pain but better than previous  Has made great progress in PT  VITALS:  Vitals:    04/24/23 1213   BP: 147/89   Pulse: 85       PHYSICAL EXAMINATION:  General: well developed and well nourished, alert, oriented times 3 and appears comfortable  Psychiatric: Normal    MUSCULOSKELETAL EXAMINATION:    Incision: Well-healed incisions  No effusion  0 to 130 degrees of flexion  No significant medial joint line tenderness  Knee stable to valgus, varus, Lachman, posterior drawer  NVI distally      PLAN:    Mikey Fernández continues to progress well after meniscectomy  I recommend he continue physical therapy per protocol  He continues to have some medial joint line pain  He is meniscally deficient at this time and does have a higher risk of progressive arthritis in that area  I recommended a medial  brace  New letter with work restrictions provided  He will follow up after getting the brace and using in PT  He will return to full duty after next visit if he feels better in the brace

## 2023-09-22 ENCOUNTER — HOSPITAL ENCOUNTER (EMERGENCY)
Facility: HOSPITAL | Age: 43
Discharge: HOME | End: 2023-09-23
Attending: EMERGENCY MEDICINE | Admitting: EMERGENCY MEDICINE
Payer: COMMERCIAL

## 2023-09-22 DIAGNOSIS — L29.9 PRURITUS OF SKIN: ICD-10-CM

## 2023-09-22 DIAGNOSIS — R11.0 NAUSEA: ICD-10-CM

## 2023-09-22 DIAGNOSIS — T78.40XA ALLERGIC REACTION, INITIAL ENCOUNTER: Primary | ICD-10-CM

## 2023-09-22 PROCEDURE — 99285 EMERGENCY DEPT VISIT HI MDM: CPT

## 2023-09-22 PROCEDURE — 96374 THER/PROPH/DIAG INJ IV PUSH: CPT

## 2023-09-22 PROCEDURE — 96361 HYDRATE IV INFUSION ADD-ON: CPT

## 2023-09-22 PROCEDURE — 99284 EMERGENCY DEPT VISIT MOD MDM: CPT

## 2023-09-22 PROCEDURE — 93005 ELECTROCARDIOGRAM TRACING: CPT

## 2023-09-22 PROCEDURE — 94640 AIRWAY INHALATION TREATMENT: CPT

## 2023-09-22 PROCEDURE — 96376 TX/PRO/DX INJ SAME DRUG ADON: CPT

## 2023-09-22 PROCEDURE — 96372 THER/PROPH/DIAG INJ SC/IM: CPT

## 2023-09-22 PROCEDURE — 96375 TX/PRO/DX INJ NEW DRUG ADDON: CPT

## 2023-09-22 RX ORDER — ONDANSETRON 2 MG/ML
4 INJECTION INTRAMUSCULAR; INTRAVENOUS ONCE
Status: COMPLETED | OUTPATIENT
Start: 2023-09-22 | End: 2023-09-22

## 2023-09-22 RX ORDER — EPINEPHRINE 1 MG/ML
0.3 INJECTION, SOLUTION, CONCENTRATE INTRAVENOUS ONCE
Status: COMPLETED | OUTPATIENT
Start: 2023-09-22 | End: 2023-09-22

## 2023-09-22 RX ORDER — PREDNISONE 20 MG/1
20 TABLET ORAL ONCE
Status: COMPLETED | OUTPATIENT
Start: 2023-09-22 | End: 2023-09-22

## 2023-09-22 RX ORDER — ALBUTEROL SULFATE 90 UG/1
2 AEROSOL, METERED RESPIRATORY (INHALATION) ONCE
Status: COMPLETED | OUTPATIENT
Start: 2023-09-22 | End: 2023-09-22

## 2023-09-22 RX ORDER — PREDNISONE 20 MG/1
40 TABLET ORAL ONCE
Status: COMPLETED | OUTPATIENT
Start: 2023-09-22 | End: 2023-09-22

## 2023-09-22 RX ORDER — FAMOTIDINE 10 MG/ML
20 INJECTION, SOLUTION INTRAVENOUS ONCE
Status: COMPLETED | OUTPATIENT
Start: 2023-09-22 | End: 2023-09-22

## 2023-09-22 RX ORDER — DIPHENHYDRAMINE HYDROCHLORIDE 50 MG/ML
25 INJECTION INTRAMUSCULAR; INTRAVENOUS ONCE
Status: COMPLETED | OUTPATIENT
Start: 2023-09-22 | End: 2023-09-22

## 2023-09-22 RX ADMIN — FAMOTIDINE 20 MG: 10 INJECTION INTRAVENOUS at 21:14

## 2023-09-22 RX ADMIN — PREDNISONE 40 MG: 20 TABLET ORAL at 20:38

## 2023-09-22 RX ADMIN — SODIUM CHLORIDE 1000 ML: 0.9 INJECTION, SOLUTION INTRAVENOUS at 20:43

## 2023-09-22 RX ADMIN — DIPHENHYDRAMINE HYDROCHLORIDE 25 MG: 50 INJECTION, SOLUTION INTRAMUSCULAR; INTRAVENOUS at 20:40

## 2023-09-22 RX ADMIN — PREDNISONE 20 MG: 20 TABLET ORAL at 23:02

## 2023-09-22 RX ADMIN — ONDANSETRON 4 MG: 2 INJECTION INTRAMUSCULAR; INTRAVENOUS at 20:52

## 2023-09-22 RX ADMIN — DIPHENHYDRAMINE HYDROCHLORIDE 25 MG: 50 INJECTION, SOLUTION INTRAMUSCULAR; INTRAVENOUS at 23:02

## 2023-09-22 RX ADMIN — EPINEPHRINE 0.3 MG: 1 INJECTION, SOLUTION, CONCENTRATE INTRAVENOUS at 20:41

## 2023-09-22 RX ADMIN — ALBUTEROL SULFATE 2 PUFF: 90 AEROSOL, METERED RESPIRATORY (INHALATION) at 23:04

## 2023-09-23 VITALS
OXYGEN SATURATION: 96 % | WEIGHT: 159.39 LBS | RESPIRATION RATE: 20 BRPM | BODY MASS INDEX: 24.24 KG/M2 | SYSTOLIC BLOOD PRESSURE: 151 MMHG | HEART RATE: 103 BPM | TEMPERATURE: 98.4 F | DIASTOLIC BLOOD PRESSURE: 86 MMHG

## 2023-09-23 PROCEDURE — 96376 TX/PRO/DX INJ SAME DRUG ADON: CPT

## 2023-09-23 RX ORDER — FAMOTIDINE 20 MG/1
20 TABLET, FILM COATED ORAL 2 TIMES DAILY
Qty: 30 TABLET | Refills: 0 | Status: SHIPPED | OUTPATIENT
Start: 2023-09-23

## 2023-09-23 RX ORDER — DIPHENHYDRAMINE HYDROCHLORIDE 50 MG/ML
25 INJECTION INTRAMUSCULAR; INTRAVENOUS ONCE
Status: COMPLETED | OUTPATIENT
Start: 2023-09-23 | End: 2023-09-23

## 2023-09-23 RX ORDER — PREDNISONE 20 MG/1
30 TABLET ORAL DAILY
Qty: 10 TABLET | Refills: 0 | Status: SHIPPED | OUTPATIENT
Start: 2023-09-23 | End: 2023-09-23 | Stop reason: SDUPTHER

## 2023-09-23 RX ORDER — DIPHENHYDRAMINE HCL 25 MG
25 TABLET ORAL EVERY 6 HOURS
Qty: 20 TABLET | Refills: 0 | Status: SHIPPED | OUTPATIENT
Start: 2023-09-23

## 2023-09-23 RX ORDER — PREDNISONE 20 MG/1
30 TABLET ORAL DAILY
Qty: 8 TABLET | Refills: 0 | Status: SHIPPED | OUTPATIENT
Start: 2023-09-23 | End: 2023-09-28

## 2023-09-23 RX ORDER — IPRATROPIUM BROMIDE AND ALBUTEROL SULFATE 2.5; .5 MG/3ML; MG/3ML
3 SOLUTION RESPIRATORY (INHALATION)
Status: DISCONTINUED | OUTPATIENT
Start: 2023-09-23 | End: 2023-09-23

## 2023-09-23 RX ORDER — EPINEPHRINE 0.3 MG/.3ML
0.3 INJECTION SUBCUTANEOUS ONCE
Qty: 0.6 ML | Refills: 0 | Status: SHIPPED | OUTPATIENT
Start: 2023-09-23 | End: 2023-09-23

## 2023-09-23 RX ORDER — IPRATROPIUM BROMIDE AND ALBUTEROL SULFATE 2.5; .5 MG/3ML; MG/3ML
3 SOLUTION RESPIRATORY (INHALATION) ONCE
Status: COMPLETED | OUTPATIENT
Start: 2023-09-23 | End: 2023-09-23

## 2023-09-23 RX ADMIN — IPRATROPIUM BROMIDE AND ALBUTEROL SULFATE 3 ML: 2.5; .5 SOLUTION RESPIRATORY (INHALATION) at 00:10

## 2023-09-23 RX ADMIN — DIPHENHYDRAMINE HYDROCHLORIDE 25 MG: 50 INJECTION, SOLUTION INTRAMUSCULAR; INTRAVENOUS at 02:41

## 2023-09-23 NOTE — DISCHARGE INSTRUCTIONS
Today I provided you with prescription for Benadryl, Pepcid, prednisone. Take as directed for symptomatic relief. The Benadryl and Pepcid to help with the itchiness you are experiencing. The prednisone will help with reducing overall inflammation from allergic response. Use medications for the next 2 to 3 days. If you are not experiencing symptoms, you can discontinue the medications. I also provided you with a EpiPen. Keep the EpiPen on hand for future use if allergic reaction occurs again. Monitor symptoms in the coming days. Return to ED for new or worsening symptoms.

## 2023-09-23 NOTE — ED PROVIDER NOTES
Pepcid, Benadryl, EpiPen, history  Chief Complaint   Patient presents with   • Allergic Reaction     Pt reports accidentally eating shellfish today 20 min pta, known allergy of same. Patient reports reddness and itching to bilateral arms and chest. No hives, denies SOB. 38 yo male presents to ED for evaluation of suspected allergic reaction. Patient states that 20 mins pta he accidentally ate shellfish. He ordered a ham and cheese empanada and he received a item containing several whole shrimp. He ate minimal amounts of the shrimp before realizing what he was eating. He proceeded to immediately come to the ED. No medications prior to arrival.  Patient endorses generalized itching, erythema of extremities, swelling of tongue, shortness of breath. Previously had anaphylactic reaction to shellfish years ago. Prior to Admission Medications   Prescriptions Last Dose Informant Patient Reported? Taking? Lidocaine (HM Lidocaine Patch) 4 % PTCH   No No   Sig: Apply 1 patch topically in the morning   acetaminophen (TYLENOL) 500 mg tablet Unknown  No No   Sig: Take 2 tablets (1,000 mg total) by mouth every 8 (eight) hours   albuterol (Ventolin HFA) 90 mcg/act inhaler 9/22/2023  No Yes   Sig: Inhale 2 puffs every 6 (six) hours as needed for wheezing   celecoxib (CeleBREX) 100 mg capsule Unknown  Yes No   cyclobenzaprine (FLEXERIL) 10 mg tablet Unknown  No No   Sig: Take 1 tablet (10 mg total) by mouth 3 (three) times a day as needed for muscle spasms   eszopiclone (LUNESTA) 1 mg tablet   Yes No   Sig: Take 1-2 mg by mouth daily at bedtime For sleep   lidocaine (LIDODERM) 5 %   Yes No   Sig: APPLY 1 PATCH TOPICALLY EVERY MORNING   naloxone (NARCAN) 4 mg/0.1 mL nasal spray   No No   Sig: Administer 1 spray into a nostril. If no response after 2-3 minutes, give another dose in the other nostril using a new spray.    Patient not taking: Reported on 3/27/2023   naproxen (EC NAPROSYN) 500 MG EC tablet Unknown  No No Sig: TAKE 1 TABLET(500 MG) BY MOUTH TWICE DAILY WITH MEALS   ondansetron (ZOFRAN) 4 mg tablet Unknown  No No   Sig: Take 1 tablet (4 mg total) by mouth every 8 (eight) hours as needed for nausea or vomiting   oxyCODONE (ROXICODONE) 5 immediate release tablet 9/22/2023  Yes Yes   pregabalin (LYRICA) 200 MG capsule Unknown  No No   Sig: Take 1 capsule (200 mg total) by mouth 3 (three) times a day      Facility-Administered Medications: None       Past Medical History:   Diagnosis Date   • Anxiety    • Asthma    • Brain aneurysm    • Chronic pain     back   • COVID-19 2020   • Depression    • GERD (gastroesophageal reflux disease)        Past Surgical History:   Procedure Laterality Date   • ARTHROSCOPY KNEE Left 11/8/2022    Procedure: Left knee arthroscopy, medial meniscus repair, possible medial meniscectomy;  Surgeon: Dario Ann MD;  Location: [facility];  Service: Orthopedics   • ARTHROSCOPY KNEE Left 2/13/2023    Procedure: Left Knee Arthroscopy, Partial Medial Meniscectomy, Removal of meniscal repair device.;  Surgeon: Dario Ann MD;  Location:  MAIN OR;  Service: Orthopedics   • BACK SURGERY     • LUMBAR DISCECTOMY  2015    & fusion; L5-S1       Family History   Problem Relation Age of Onset   • Cancer Mother    • Cancer Father    • Diabetes Father    • Diabetes Sister    • No Known Problems Maternal Grandmother    • No Known Problems Maternal Grandfather    • No Known Problems Paternal Grandmother    • Diabetes Paternal Grandfather      I have reviewed and agree with the history as documented.     E-Cigarette/Vaping   • E-Cigarette Use Never User      E-Cigarette/Vaping Substances     Social History     Tobacco Use   • Smoking status: Every Day     Packs/day: 0.25     Types: Cigarettes   • Smokeless tobacco: Never   Vaping Use   • Vaping Use: Never used   Substance Use Topics   • Alcohol use: No   • Drug use: Not Currently     Types: Marijuana     Comment: patient had medical marijuana        Review of Systems   Constitutional: Negative for chills, diaphoresis, fatigue and fever. HENT: Negative for ear pain and sore throat. Eyes: Negative for pain and visual disturbance. Respiratory: Positive for chest tightness and shortness of breath. Negative for apnea, cough, wheezing and stridor. Cardiovascular: Negative for chest pain and palpitations. Gastrointestinal: Negative for abdominal pain and vomiting. Genitourinary: Negative for dysuria and hematuria. Musculoskeletal: Negative for arthralgias and back pain. Skin: Positive for color change and rash. Neurological: Negative for seizures and syncope. All other systems reviewed and are negative. Physical Exam  Physical Exam  Vitals and nursing note reviewed. Constitutional:       General: He is not in acute distress. Appearance: He is well-developed. He is ill-appearing. He is not toxic-appearing or diaphoretic. HENT:      Head: Normocephalic and atraumatic. Mouth/Throat:      Mouth: Mucous membranes are moist. No oral lesions or angioedema. Pharynx: Oropharynx is clear. No pharyngeal swelling, oropharyngeal exudate, posterior oropharyngeal erythema or uvula swelling. Tonsils: No tonsillar exudate or tonsillar abscesses. Eyes:      Extraocular Movements: Extraocular movements intact. Conjunctiva/sclera: Conjunctivae normal.      Pupils: Pupils are equal, round, and reactive to light. Cardiovascular:      Rate and Rhythm: Regular rhythm. Tachycardia present. Pulses: Normal pulses. Heart sounds: Normal heart sounds. No murmur heard. No friction rub. No gallop. Pulmonary:      Effort: Pulmonary effort is normal. No respiratory distress. Breath sounds: No stridor. Wheezing present. No rhonchi or rales. Abdominal:      Palpations: Abdomen is soft. Tenderness: There is no abdominal tenderness. Musculoskeletal:         General: No swelling.       Cervical back: Neck supple. Skin:     General: Skin is warm. Capillary Refill: Capillary refill takes less than 2 seconds. Comments: On presentation patient is frantically scratching arms and legs. No urticaria seen however there is scattered erythema on body at site of where patient is scratching. Neurological:      General: No focal deficit present. Mental Status: He is alert and oriented to person, place, and time. Mental status is at baseline. Cranial Nerves: Cranial nerves 2-12 are intact. Sensory: Sensation is intact. Motor: Motor function is intact. Coordination: Coordination is intact. Gait: Gait is intact.    Psychiatric:         Mood and Affect: Mood normal.         Vital Signs  ED Triage Vitals [09/22/23 2026]   Temperature Pulse Respirations Blood Pressure SpO2   98.4 °F (36.9 °C) (!) 133 18 (!) 172/106 98 %      Temp Source Heart Rate Source Patient Position - Orthostatic VS BP Location FiO2 (%)   Oral Monitor Lying Right arm --      Pain Score       --           Vitals:    09/22/23 2330 09/23/23 0030 09/23/23 0130 09/23/23 0245   BP: 153/99 161/82 123/80 151/86   Pulse: 104 101 102 103   Patient Position - Orthostatic VS: Lying Lying Lying Lying         Visual Acuity      ED Medications  Medications   diphenhydrAMINE (BENADRYL) injection 25 mg (25 mg Intravenous Given 9/22/23 2040)   predniSONE tablet 40 mg (40 mg Oral Given 9/22/23 2038)   EPINEPHrine PF (ADRENALIN) 1 mg/mL injection 0.3 mg (0.3 mg Intramuscular Given 9/22/23 2041)   sodium chloride 0.9 % bolus 1,000 mL (0 mL Intravenous Stopped 9/22/23 2156)   ondansetron (ZOFRAN) injection 4 mg (4 mg Intravenous Given 9/22/23 2052)   Famotidine (PF) (PEPCID) injection 20 mg (20 mg Intravenous Given 9/22/23 2114)   diphenhydrAMINE (BENADRYL) injection 25 mg (25 mg Intravenous Given 9/22/23 2302)   predniSONE tablet 20 mg (20 mg Oral Given 9/22/23 2302)   albuterol (PROVENTIL HFA,VENTOLIN HFA) inhaler 2 puff (2 puffs Inhalation Given 9/22/23 2304)   ipratropium-albuterol (DUO-NEB) 0.5-2.5 mg/3 mL inhalation solution 3 mL (3 mL Nebulization Given 9/23/23 0010)   diphenhydrAMINE (BENADRYL) injection 25 mg (25 mg Intravenous Given 9/23/23 0241)       Diagnostic Studies  Results Reviewed     None                 No orders to display              Procedures  Procedures         ED Course                               SBIRT 20yo+    Flowsheet Row Most Recent Value   Initial Alcohol Screen: US AUDIT-C     1. How often do you have a drink containing alcohol? 0 Filed at: 09/22/2023 2036   2. How many drinks containing alcohol do you have on a typical day you are drinking? 0 Filed at: 09/22/2023 2036   3a. Male UNDER 65: How often do you have five or more drinks on one occasion? 0 Filed at: 09/22/2023 2036   Audit-C Score 0 Filed at: 09/22/2023 2036   ALVIN: How many times in the past year have you. .. Used an illegal drug or used a prescription medication for non-medical reasons? Never Filed at: 09/22/2023 2036                    Medical Decision Making  59-year-old male presents ED for evaluation of allergic reaction. 20 minutes prior to arrival he was at a restaurant where he ordered ham and cheese empanada. The food he received contained shrimp. He has history of previous anaphylactic reaction to shellfish. After exposure he began to experience generalized pruritus, shortness of breath, airway tightness, tongue swelling, nausea. On presentation to the ED patient is frantically scratching his arms and legs. He is describing having tongue swelling. On lung auscultation I hear scattered wheezing in upper lobes. Do not see large amounts of tongue swelling however patient is describing tongue swelling and therefore will treat accordingly. Airway secured. Does have some erythema of arms and legs where he is scratching. Has elevated blood pressure of 172/106, afebrile, pulse of 133 bpm, 98% oxygenation.   Given patient's history of anaphylaxis to shellfish, presentation will provide treatment for allergic reaction consisting of IM epinephrine, Benadryl, prednisone, Zofran, IV fluids. Obtaining EKG. Differential diagnosis includes but is not limited to anaphylaxis, asthma, contact dermatitis, MI, ACS, arrhythmia, anxiety attack. Strongly suspect symptoms are due to allergic reaction from shellfish. EKG returned without evidence of acute cardiac injury or arrhythmia. Performed multiple frequent evaluations of patient to ensure symptom resolution and maintenance of airway. Provided additional dose of Benadryl as needed to help with generalized pruritus. Added on Pepcid dose as well for pruritus. DuoNeb treatment given after patient described having airway tightness around midnight. DuoNeb improved patient's symptoms. After observing patient for approximately 6 hours, he is doing much better. No longer having the severe symptoms that he presented with. Does have minimal itching. Will provide with another dose of Benadryl prior to discharge. Providing patient with prescription for Benadryl, Pepcid, EpiPen. Advised patient to monitor symptoms in the next couple days and return to ED for new or worsening symptoms. Advised patient that rebound allergic reactions can occur. It is important to monitor for signs of rebound allergic reactions and return to ED if they occur. Patient verbally expresses understanding of this. Watched patient walk out of ED without any difficulty. At time of discharge he is in no respiratory distress. Lungs without any wheezing. No angioedema. Airway secured. Normotensive. Afebrile. Heart rate 103. Prior to discharge, discharge instructions were discussed with patient at bedside. Patient was provided both verbal and written instructions. Patient is understanding of the discharge instructions and is agreeable to plan of care.  Return precautions were discussed with patient bedside, patient verbalized understanding of signs and symptoms that would necessitate return to the ED. All questions were answered. Patient was comfortable with the plan of care and discharged to home. Risk  OTC drugs. Prescription drug management. Disposition  Final diagnoses: Allergic reaction, initial encounter   Pruritus of skin   Nausea     Time reflects when diagnosis was documented in both MDM as applicable and the Disposition within this note     Time User Action Codes Description Comment    9/23/2023  2:37 AM Mohini Nina Add [T78.40XA] Allergic reaction, initial encounter     9/23/2023  2:37 AM Mohini Nina Add [L29.9] Pruritus of skin     9/23/2023  4:01 AM Mohini Nina Add [R11.0] Nausea       ED Disposition     ED Disposition   Discharge    Condition   Stable    Date/Time   Sat Sep 23, 2023  2:37 AM    Comment   Shwetha Humphrey discharge to home/self care.                Follow-up Information     Follow up With Specialties Details Why Contact Info    WARNER Romero Nurse Practitioner   [address] [place] [address]  [phone]            Discharge Medication List as of 9/23/2023  2:43 AM      START taking these medications    Details   diphenhydrAMINE (BENADRYL) 25 mg tablet Take 1 tablet (25 mg total) by mouth every 6 (six) hours, Starting Sat 9/23/2023, Normal      EPINEPHrine (EPIPEN) 0.3 mg/0.3 mL SOAJ Inject 0.3 mL (0.3 mg total) into a muscle once for 1 dose, Starting Sat 9/23/2023, Normal      famotidine (PEPCID) 20 mg tablet Take 1 tablet (20 mg total) by mouth 2 (two) times a day, Starting Sat 9/23/2023, Normal         CONTINUE these medications which have CHANGED    Details   predniSONE 20 mg tablet Take 1.5 tablets (30 mg total) by mouth daily for 5 days, Starting Sat 9/23/2023, Until Thu 9/28/2023, Normal         CONTINUE these medications which have NOT CHANGED    Details   albuterol (Ventolin HFA) 90 mcg/act inhaler Inhale 2 puffs every 6 (six) hours as needed for wheezing, Starting Wed 12/8/2021, Normal      oxyCODONE (ROXICODONE) 5 immediate release tablet Historical Med      acetaminophen (TYLENOL) 500 mg tablet Take 2 tablets (1,000 mg total) by mouth every 8 (eight) hours, Starting Mon 2/13/2023, Normal      celecoxib (CeleBREX) 100 mg capsule Starting Tue 4/11/2023, Historical Med      cyclobenzaprine (FLEXERIL) 10 mg tablet Take 1 tablet (10 mg total) by mouth 3 (three) times a day as needed for muscle spasms, Starting Tue 2/7/2023, Normal      eszopiclone (LUNESTA) 1 mg tablet Take 1-2 mg by mouth daily at bedtime For sleep, Starting Tue 2/21/2023, Historical Med      Lidocaine (HM Lidocaine Patch) 4 % PTCH Apply 1 patch topically in the morning, Starting Mon 4/24/2023, Normal      lidocaine (LIDODERM) 5 % APPLY 1 PATCH TOPICALLY EVERY MORNING, Historical Med      naloxone (NARCAN) 4 mg/0.1 mL nasal spray Administer 1 spray into a nostril. If no response after 2-3 minutes, give another dose in the other nostril using a new spray., Normal      naproxen (EC NAPROSYN) 500 MG EC tablet TAKE 1 TABLET(500 MG) BY MOUTH TWICE DAILY WITH MEALS, Normal      ondansetron (ZOFRAN) 4 mg tablet Take 1 tablet (4 mg total) by mouth every 8 (eight) hours as needed for nausea or vomiting, Starting Mon 2/13/2023, Normal      pregabalin (LYRICA) 200 MG capsule Take 1 capsule (200 mg total) by mouth 3 (three) times a day, Starting Tue 1/24/2023, Normal             No discharge procedures on file.     PDMP Review       Value Time User    PDMP Reviewed  Yes 11/11/2022  3:08 PM Dayana Weber MD          ED Provider  Electronically Signed by           Herb Fuentes PA-C  09/23/23 2577

## 2023-09-24 LAB
ATRIAL RATE: 104 BPM
P AXIS: 63 DEGREES
PR INTERVAL: 170 MS
QRS AXIS: 88 DEGREES
QRSD INTERVAL: 86 MS
QT INTERVAL: 346 MS
QTC INTERVAL: 454 MS
T WAVE AXIS: 15 DEGREES
VENTRICULAR RATE: 104 BPM

## 2023-09-24 PROCEDURE — 93010 ELECTROCARDIOGRAM REPORT: CPT | Performed by: INTERNAL MEDICINE

## 2024-10-02 ENCOUNTER — TELEPHONE (OUTPATIENT)
Age: 44
End: 2024-10-02

## 2024-10-02 NOTE — TELEPHONE ENCOUNTER
Caller: Albert    Doctor: Azael    Reason for call: Asking for 1st and last DOS patient was seen from 10/31/22-present     Call back#:

## 2025-01-30 ENCOUNTER — OFFICE VISIT (OUTPATIENT)
Dept: URGENT CARE | Facility: MEDICAL CENTER | Age: 45
End: 2025-01-30

## 2025-01-30 VITALS
OXYGEN SATURATION: 97 % | RESPIRATION RATE: 18 BRPM | HEIGHT: 68 IN | DIASTOLIC BLOOD PRESSURE: 76 MMHG | TEMPERATURE: 97 F | HEART RATE: 93 BPM | SYSTOLIC BLOOD PRESSURE: 136 MMHG | WEIGHT: 162.6 LBS | BODY MASS INDEX: 24.64 KG/M2
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
## 2025-01-30 RX ORDER — MELOXICAM 15 MG/1
15 TABLET ORAL DAILY
COMMUNITY
Start: 2025-01-15

## 2025-01-30 RX ORDER — PREGABALIN 300 MG/1
1 CAPSULE ORAL 2 TIMES DAILY
COMMUNITY
Start: 2025-01-15

## 2025-01-30 RX ORDER — BACLOFEN 10 MG/1
10 TABLET ORAL 3 TIMES DAILY
COMMUNITY
Start: 2024-12-30

## 2025-01-30 RX ORDER — OXYCODONE HYDROCHLORIDE 10 MG/1
10 TABLET ORAL
COMMUNITY
Start: 2025-01-22

## 2025-01-30 RX ORDER — LIDOCAINE 40 MG/G
CREAM TOPICAL DAILY
COMMUNITY
Start: 2025-01-15

## 2025-01-30 NOTE — PROGRESS NOTES
[facility]        NAME: Jayjay Pollard is a 44 y.o. male  : 1980    MRN: [id number]  DATE: 2025  TIME: 12:30 PM    Assessment and Plan   Fall, initial encounter [W19.XXXA]  1. Fall, initial encounter  Transfer to other facility      Patient presents with multiple injuries after falling on Monday on the ice.  Patietn has pain that is not proportionate to injuries.  Patient takes 25 mg of Oxycodeine daily in addition to lyrica.        Patient Instructions       Follow up with PCP in 3-5 days.  Proceed to  ER if symptoms worsen.    If tests have been performed at [facility], our office will contact you with results if changes need to be made to the care plan discussed with you at the visit.  You can review your full results on [facility].    Chief Complaint     Chief Complaint   Patient presents with    Hip Pain     Pt was walking and fell landing on back on Monday night. Pt has pain ing lower back, butt, hip, on the left side. Pt also has pain in head, neck and arm on left side. Overall pain is a 10/10.         History of Present Illness       This is a 44-year-old male who presents today with multiple complaints.  He states Monday night he slipped on the ice and fell onto his left side.  He is unsure whether he hit his head or not.  He complains of left shoulder pain that radiates up into his neck, headache, left elbow pain, left hip pain left lower back pain.  He states that 6 months ago he had knee surgery.  He states he has been trying to get out and exercise.  Patient has a history of lumbar spondylosis, brain aneurysm, chronic back pain, lower back pain with sciatica, no lumbar radiculopathy, degenerative disc disease in the lumbar area, chronic neck pain, and thoracic lumbar sacral neuritis or radiculitis.  Patient ambulates without difficulty he states his pain is 10 out of 10 with taking oxycodone 25 mg and 2 doses a day.  He also takes Lyrica.  I did discuss with patient  that he needs further evaluation in the emergency room.  Patient stated he needed to stop at home first but will go to the emergency room.  Patient is alert and oriented and answers questions appropriately.  His wife is with him.        Review of Systems   Review of Systems   Constitutional:  Negative for chills, diaphoresis and fatigue.   HENT: Negative.     Respiratory: Negative.     Cardiovascular: Negative.    Gastrointestinal: Negative.    Genitourinary: Negative.    Musculoskeletal:  Positive for arthralgias, gait problem, myalgias and neck pain. Negative for neck stiffness.   Neurological:  Positive for headaches. Negative for dizziness and light-headedness.         Current Medications       Current Outpatient Medications:     acetaminophen (TYLENOL) 500 mg tablet, Take 2 tablets (1,000 mg total) by mouth every 8 (eight) hours, Disp: 60 tablet, Rfl: 0    albuterol (Ventolin HFA) 90 mcg/act inhaler, Inhale 2 puffs every 6 (six) hours as needed for wheezing, Disp: 18 g, Rfl: 5    baclofen 10 mg tablet, Take 10 mg by mouth 3 (three) times a day, Disp: , Rfl:     celecoxib (CeleBREX) 100 mg capsule, , Disp: , Rfl:     cyclobenzaprine (FLEXERIL) 10 mg tablet, Take 1 tablet (10 mg total) by mouth 3 (three) times a day as needed for muscle spasms, Disp: 30 tablet, Rfl: 1    diphenhydrAMINE (BENADRYL) 25 mg tablet, Take 1 tablet (25 mg total) by mouth every 6 (six) hours, Disp: 20 tablet, Rfl: 0    eszopiclone (LUNESTA) 1 mg tablet, Take 1-2 mg by mouth daily at bedtime For sleep, Disp: , Rfl:     famotidine (PEPCID) 20 mg tablet, Take 1 tablet (20 mg total) by mouth 2 (two) times a day, Disp: 30 tablet, Rfl: 0    Lidocaine (HM Lidocaine Patch) 4 % PTCH, Apply 1 patch topically in the morning, Disp: 15 patch, Rfl: 1    lidocaine (LIDODERM) 5 %, APPLY 1 PATCH TOPICALLY EVERY MORNING, Disp: , Rfl:     lidocaine (LMX) 4 % cream, Apply topically daily, Disp: , Rfl:     meloxicam (MOBIC) 15 mg tablet, Take 15 mg by mouth  daily, Disp: , Rfl:     naproxen (EC NAPROSYN) 500 MG EC tablet, TAKE 1 TABLET(500 MG) BY MOUTH TWICE DAILY WITH MEALS, Disp: 20 tablet, Rfl: 0    ondansetron (ZOFRAN) 4 mg tablet, Take 1 tablet (4 mg total) by mouth every 8 (eight) hours as needed for nausea or vomiting, Disp: 20 tablet, Rfl: 0    oxyCODONE (ROXICODONE) 10 MG TABS, Take 10 mg by mouth every 3 (three) hours as needed, Disp: , Rfl:     oxyCODONE (ROXICODONE) 5 immediate release tablet, , Disp: , Rfl:     pregabalin (LYRICA) 200 MG capsule, Take 1 capsule (200 mg total) by mouth 3 (three) times a day, Disp: 90 capsule, Rfl: 0    pregabalin (LYRICA) 300 MG capsule, Take 1 capsule by mouth 2 (two) times a day, Disp: , Rfl:     EPINEPHrine (EPIPEN) 0.3 mg/0.3 mL SOAJ, Inject 0.3 mL (0.3 mg total) into a muscle once for 1 dose, Disp: 0.6 mL, Rfl: 0    naloxone (NARCAN) 4 mg/0.1 mL nasal spray, Administer 1 spray into a nostril. If no response after 2-3 minutes, give another dose in the other nostril using a new spray. (Patient not taking: Reported on 1/30/2025), Disp: 1 each, Rfl: 1    Current Allergies     Allergies as of 01/30/2025 - Reviewed 01/30/2025   Allergen Reaction Noted    Shellfish-derived products - food allergy Anaphylaxis 01/11/2016            The following portions of the patient's history were reviewed and updated as appropriate: allergies, current medications, past family history, past medical history, past social history, past surgical history and problem list.     Past Medical History:   Diagnosis Date    Anxiety     Asthma     Brain aneurysm     Chronic pain     back    COVID-19 2020    Depression     GERD (gastroesophageal reflux disease)        Past Surgical History:   Procedure Laterality Date    ARTHROSCOPY KNEE Left 11/8/2022    Procedure: Left knee arthroscopy, medial meniscus repair, possible medial meniscectomy;  Surgeon: Rashi Addison MD;  Location: [facility];  Service: Orthopedics    ARTHROSCOPY KNEE Left  "2/13/2023    Procedure: Left Knee Arthroscopy, Partial Medial Meniscectomy, Removal of meniscal repair device.;  Surgeon: Rashi Addison MD;  Location:  MAIN OR;  Service: Orthopedics    BACK SURGERY      FL LUMBAR PUNCTURE DIAGNOSTIC  5/8/2017    FL LUMBAR PUNCTURE DIAGNOSTIC  7/9/2017    LUMBAR DISCECTOMY  2015    & fusion; L5-S1       Family History   Problem Relation Age of Onset    Cancer Mother     Cancer Father     Diabetes Father     Diabetes Sister     No Known Problems Maternal Grandmother     No Known Problems Maternal Grandfather     No Known Problems Paternal Grandmother     Diabetes Paternal Grandfather          Medications have been verified.        Objective   /76   Pulse 93   Temp (!) 97 °F (36.1 °C) (Tympanic)   Resp 18   Ht 5' 8\" (1.727 m)   Wt 73.8 kg (162 lb 9.6 oz)   SpO2 97%   BMI 24.72 kg/m²   No LMP for male patient.       Physical Exam     Physical Exam  Constitutional:       Appearance: Normal appearance.   HENT:      Head: Normocephalic and atraumatic.      Mouth/Throat:      Mouth: Mucous membranes are moist.      Pharynx: Oropharynx is clear.   Eyes:      Conjunctiva/sclera: Conjunctivae normal.      Pupils: Pupils are equal, round, and reactive to light.   Cardiovascular:      Rate and Rhythm: Normal rate and regular rhythm.      Pulses: Normal pulses.      Heart sounds: Normal heart sounds.   Pulmonary:      Effort: Pulmonary effort is normal.      Breath sounds: Normal breath sounds.   Musculoskeletal:         General: Tenderness and signs of injury present. No deformity.   Skin:     General: Skin is warm.      Capillary Refill: Capillary refill takes less than 2 seconds.   Neurological:      General: No focal deficit present.      Mental Status: He is alert and oriented to person, place, and time.   Psychiatric:         Mood and Affect: Mood normal.         Thought Content: Thought content normal.         Judgment: Judgment normal.                   "

## 2025-06-25 ENCOUNTER — OFFICE VISIT (OUTPATIENT)
Dept: FAMILY MEDICINE CLINIC | Facility: CLINIC | Age: 45
End: 2025-06-25
Payer: COMMERCIAL

## 2025-06-25 ENCOUNTER — TELEPHONE (OUTPATIENT)
Dept: FAMILY MEDICINE CLINIC | Facility: CLINIC | Age: 45
End: 2025-06-25

## 2025-06-25 VITALS
HEIGHT: 67 IN | OXYGEN SATURATION: 98 % | BODY MASS INDEX: 25.93 KG/M2 | TEMPERATURE: 97.1 F | WEIGHT: 165.2 LBS | DIASTOLIC BLOOD PRESSURE: 90 MMHG | HEART RATE: 92 BPM | SYSTOLIC BLOOD PRESSURE: 124 MMHG

## 2025-06-25 DIAGNOSIS — Z11.4 ENCOUNTER FOR SCREENING FOR HIV: ICD-10-CM

## 2025-06-25 DIAGNOSIS — M54.40 ACUTE BILATERAL LOW BACK PAIN WITH SCIATICA, SCIATICA LATERALITY UNSPECIFIED: ICD-10-CM

## 2025-06-25 DIAGNOSIS — I67.1 BRAIN ANEURYSM: ICD-10-CM

## 2025-06-25 DIAGNOSIS — Z00.00 ANNUAL PHYSICAL EXAM: Primary | ICD-10-CM

## 2025-06-25 DIAGNOSIS — M54.2 NECK PAIN: ICD-10-CM

## 2025-06-25 DIAGNOSIS — Z11.59 NEED FOR HEPATITIS C SCREENING TEST: ICD-10-CM

## 2025-06-25 DIAGNOSIS — J44.9 CHRONIC OBSTRUCTIVE PULMONARY DISEASE, UNSPECIFIED COPD TYPE (HCC): ICD-10-CM

## 2025-06-25 DIAGNOSIS — Z23 ENCOUNTER FOR IMMUNIZATION: ICD-10-CM

## 2025-06-25 DIAGNOSIS — Z12.11 COLON CANCER SCREENING: ICD-10-CM
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
## 2025-06-25 PROCEDURE — 90471 IMMUNIZATION ADMIN: CPT

## 2025-06-25 PROCEDURE — 99386 PREV VISIT NEW AGE 40-64: CPT

## 2025-06-25 PROCEDURE — 99214 OFFICE O/P EST MOD 30 MIN: CPT

## 2025-06-25 PROCEDURE — 90715 TDAP VACCINE 7 YRS/> IM: CPT

## 2025-06-25 RX ORDER — ALBUTEROL SULFATE 90 UG/1
2 INHALANT RESPIRATORY (INHALATION) EVERY 6 HOURS PRN
Qty: 18 G | Refills: 5 | Status: SHIPPED | OUTPATIENT
Start: 2025-06-25

## 2025-06-25 RX ORDER — TOPIRAMATE 25 MG/1
TABLET, FILM COATED ORAL
COMMUNITY
Start: 2025-06-24

## 2025-06-25 RX ORDER — FLUTICASONE PROPIONATE AND SALMETEROL 100; 50 UG/1; UG/1
1 POWDER RESPIRATORY (INHALATION) 2 TIMES DAILY
Qty: 120 BLISTER | Refills: 6 | Status: SHIPPED | OUTPATIENT
Start: 2025-06-25 | End: 2025-06-26

## 2025-06-25 RX ORDER — OXYCODONE HYDROCHLORIDE 5 MG/1
5 TABLET ORAL EVERY 4 HOURS PRN
Qty: 30 TABLET | Refills: 0 | Status: SHIPPED | OUTPATIENT
Start: 2025-06-25

## 2025-06-25 RX ORDER — PREGABALIN 150 MG/1
150 CAPSULE ORAL 4 TIMES DAILY
Qty: 15 CAPSULE | Refills: 0 | Status: SHIPPED | OUTPATIENT
Start: 2025-06-25 | End: 2025-07-10

## 2025-06-25 RX ORDER — PREGABALIN 150 MG/1
1 CAPSULE ORAL 4 TIMES DAILY
COMMUNITY
Start: 2025-05-13 | End: 2025-06-25 | Stop reason: SDUPTHER

## 2025-06-25 NOTE — ASSESSMENT & PLAN NOTE
Continues to be a smoker   Has not been on advair in awhile but has been worsening   Orders:  •  albuterol (Ventolin HFA) 90 mcg/act inhaler; Inhale 2 puffs every 6 (six) hours as needed for wheezing  •  Fluticasone-Salmeterol (Advair Diskus) 100-50 mcg/dose inhaler; Inhale 1 puff 2 (two) times a day Rinse mouth after use.

## 2025-06-25 NOTE — PATIENT INSTRUCTIONS
"Patient Education     Routine physical for adults   The Basics   Written by the doctors and editors at [facility]   What is a physical? -- A physical is a routine visit, or \"check-up,\" with your doctor. You might also hear it called a \"wellness visit\" or \"preventive visit.\"  During each visit, the doctor will:   Ask about your physical and mental health   Ask about your habits, behaviors, and lifestyle   Do an exam   Give you vaccines if needed   Talk to you about any medicines you take   Give advice about your health   Answer your questions  Getting regular check-ups is an important part of taking care of your health. It can help your doctor find and treat any problems you have. But it's also important for preventing health problems.  A routine physical is different from a \"sick visit.\" A sick visit is when you see a doctor because of a health concern or problem. Since physicals are scheduled ahead of time, you can think about what you want to ask the doctor.  How often should I get a physical? -- It depends on your age and health. In general, for people age 21 years and older:   If you are younger than 50 years, you might be able to get a physical every 3 years.   If you are 50 years or older, your doctor might recommend a physical every year.  If you have an ongoing health condition, like diabetes or high blood pressure, your doctor will probably want to see you more often.  What happens during a physical? -- In general, each visit will include:   Physical exam - The doctor or nurse will check your height, weight, heart rate, and blood pressure. They will also look at your eyes and ears. They will ask about how you are feeling and whether you have any symptoms that bother you.   Medicines - It's a good idea to bring a list of all the medicines you take to each doctor visit. Your doctor will talk to you about your medicines and answer any questions. Tell them if you are having any side effects that bother you. You " "should also tell them if you are having trouble paying for any of your medicines.   Habits and behaviors - This includes:   Your diet   Your exercise habits   Whether you smoke, drink alcohol, or use drugs   Whether you are sexually active   Whether you feel safe at home  Your doctor will talk to you about things you can do to improve your health and lower your risk of health problems. They will also offer help and support. For example, if you want to quit smoking, they can give you advice and might prescribe medicines. If you want to improve your diet or get more physical activity, they can help you with this, too.   Lab tests, if needed - The tests you get will depend on your age and situation. For example, your doctor might want to check your:   Cholesterol   Blood sugar   Iron level   Vaccines - The recommended vaccines will depend on your age, health, and what vaccines you already had. Vaccines are very important because they can prevent certain serious or deadly infections.   Discussion of screening - \"Screening\" means checking for diseases or other health problems before they cause symptoms. Your doctor can recommend screening based on your age, risk, and preferences. This might include tests to check for:   Cancer, such as breast, prostate, cervical, ovarian, colorectal, prostate, lung, or skin cancer   Sexually transmitted infections, such as chlamydia and gonorrhea   Mental health conditions like depression and anxiety  Your doctor will talk to you about the different types of screening tests. They can help you decide which screenings to have. They can also explain what the results might mean.   Answering questions - The physical is a good time to ask the doctor or nurse questions about your health. If needed, they can refer you to other doctors or specialists, too.  Adults older than 65 years often need other care, too. As you get older, your doctor will talk to you about:   How to prevent falling at " home   Hearing or vision tests   Memory testing   How to take your medicines safely   Making sure that you have the help and support you need at home  All topics are updated as new evidence becomes available and our peer review process is complete.  This topic retrieved from [employer] on: May 02, 2024.  Topic 631534 Version 1.0  Release: 32.4.3 - C32.122  © 2024 UpToDate, Inc. and/or its affiliates. All rights reserved.  Consumer Information Use and Disclaimer   Disclaimer: This generalized information is a limited summary of diagnosis, treatment, and/or medication information. It is not meant to be comprehensive and should be used as a tool to help the user understand and/or assess potential diagnostic and treatment options. It does NOT include all information about conditions, treatments, medications, side effects, or risks that may apply to a specific patient. It is not intended to be medical advice or a substitute for the medical advice, diagnosis, or treatment of a health care provider based on the health care provider's examination and assessment of a patient's specific and unique circumstances. Patients must speak with a health care provider for complete information about their health, medical questions, and treatment options, including any risks or benefits regarding use of medications. This information does not endorse any treatments or medications as safe, effective, or approved for treating a specific patient. UpToDate, Inc. and its affiliates disclaim any warranty or liability relating to this information or the use thereof.The use of this information is governed by the Terms of Use, available at https://www.[employer].com/en/know/clinical-effectiveness-terms. 2024© UpToDate, Inc. and its affiliates and/or licensors. All rights reserved.  Copyright   © 2024 UpToDate, Inc. and/or its affiliates. All rights reserved.

## 2025-06-25 NOTE — ASSESSMENT & PLAN NOTE
Orders:  •  pregabalin (LYRICA) 150 mg capsule; Take 1 capsule (150 mg total) by mouth in the morning and 1 capsule (150 mg total) at noon and 1 capsule (150 mg total) in the evening and 1 capsule (150 mg total) before bedtime. Do all this for 15 days.  •  oxyCODONE (ROXICODONE) 5 immediate release tablet; Take 1 tablet (5 mg total) by mouth every 4 (four) hours as needed for moderate pain Max Daily Amount: 30 mg

## 2025-06-25 NOTE — TELEPHONE ENCOUNTER
Assessment:     Well adolescent  1  Encounter for well child examination without abnormal findings     2  Need for vaccination  HEPATITIS A VACCINE PEDIATRIC / ADOLESCENT 2 DOSE IM    HPV VACCINE 9 VALENT IM    influenza vaccine, quadrivalent, 0 5 mL, preservative-free, for adult and pediatric patients 6 mos+ (AFLURIA, FLUARIX, FLULAVAL, FLUZONE)   3  Screening for depression     4  Encounter for hearing screening without abnormal findings     5  Encounter for vision screening     6  Body mass index, pediatric, 5th percentile to less than 85th percentile for age     9  Exercise counseling     8  Nutritional counseling          Plan:         1  Anticipatory guidance discussed  Age-specific handout giving  Nutrition and Exercise Counseling: The patient's Body mass index is 20 3 kg/m²  This is 65 %ile (Z= 0 39) based on CDC (Girls, 2-20 Years) BMI-for-age based on BMI available as of 9/9/2020  Nutrition counseling provided:  Anticipatory guidance for nutrition given and counseled on healthy eating habits  Exercise counseling provided:  Anticipatory guidance and counseling on exercise and physical activity given  Depression Screening and Follow-up Plan:     Depression screening was negative with PHQ-A score of 2  Patient does not have thoughts of ending their life in the past month  Patient has not attempted suicide in their lifetime  2  Development: appropriate for age    1  Immunizations today: per orders  4  Follow-up visit in 1 year for next well child visit, or sooner as needed  Subjective:     Lianna Gaffney is a 15 y o  female who is here for this well-child visit  Current Issues:  Current concerns include none  regular periods, no issues    The following portions of the patient's history were reviewed and updated as appropriate:   She  has a past medical history of Eczema and Otitis media  She There are no active problems to display for this patient      She  has no Patient called into the refill for his Pregabalin 150mg tablet he takes 4 times a day and the oxyCODONE 10mg tablet that he takes 4 times a day. Informed him that I would send a message over to the providers office due to him requesting the PCP take over these medications until he sees the pain management doctor. Patient states he only has enough medication to last him for today. He would like a call back from the office regarding these medications and refills.    past surgical history on file  No current outpatient medications on file  No current facility-administered medications for this visit  She has No Known Allergies       Well Child Assessment:  History was provided by the mother  Nutrition  Food source: healthy diet  good appetite  Dental  The patient has a dental home (has spacers  starting process for braces)  The patient brushes teeth regularly  Sleep  There are no sleep problems  School  Current grade level is 8th  Current school district is [employer]  Child is doing well in school  Social  After school activity: swimming  Objective:       Vitals:    09/09/20 1457   BP: 106/70   BP Location: Left arm   Patient Position: Sitting   Cuff Size: Adult   Pulse: 80   Resp: 12   Weight: 55 3 kg (122 lb)   Height: 5' 5" (1 651 m)     Growth parameters are noted and are appropriate for age  Wt Readings from Last 1 Encounters:   09/09/20 55 3 kg (122 lb) (75 %, Z= 0 68)*     * Growth percentiles are based on CDC (Girls, 2-20 Years) data  Ht Readings from Last 1 Encounters:   09/09/20 5' 5" (1 651 m) (80 %, Z= 0 86)*     * Growth percentiles are based on CDC (Girls, 2-20 Years) data  Body mass index is 20 3 kg/m²  Vitals:    09/09/20 1457   BP: 106/70   BP Location: Left arm   Patient Position: Sitting   Cuff Size: Adult   Pulse: 80   Resp: 12   Weight: 55 3 kg (122 lb)   Height: 5' 5" (1 651 m)        Hearing Screening    Method: Audiometry    125Hz 250Hz 500Hz 1000Hz 2000Hz 3000Hz 4000Hz 6000Hz 8000Hz   Right ear: 25 25 25 25 25 25 25 25 25   Left ear: 25 25 25 25 25 25 25 25 25      Visual Acuity Screening    Right eye Left eye Both eyes   Without correction: 20/20 20/20 20/20   With correction:          Physical Exam  Constitutional:       General: She is not in acute distress  Appearance: Normal appearance  She is well-developed  HENT:      Head: Normocephalic and atraumatic        Right Ear: Tympanic membrane normal       Left Ear: Tympanic membrane normal       Mouth/Throat:      Mouth: Mucous membranes are moist       Pharynx: Oropharynx is clear  Eyes:      Conjunctiva/sclera: Conjunctivae normal    Neck:      Musculoskeletal: Neck supple  Cardiovascular:      Rate and Rhythm: Normal rate and regular rhythm  Heart sounds: Normal heart sounds  No murmur  Pulmonary:      Effort: Pulmonary effort is normal  No respiratory distress  Breath sounds: Normal breath sounds  Abdominal:      General: There is no distension  Palpations: Abdomen is soft  There is no mass  Tenderness: There is no abdominal tenderness  Genitourinary:     Mike stage (genital): 4  Musculoskeletal:         General: No deformity  Lymphadenopathy:      Cervical: No cervical adenopathy  Skin:     General: Skin is warm and dry  Neurological:      General: No focal deficit present  Mental Status: She is alert     Psychiatric:         Mood and Affect: Mood normal

## 2025-06-25 NOTE — PROGRESS NOTES
Adult Annual Physical  Name: Jayjay Pollard      : 1980      MRN: [id number]  Encounter Provider: Leonardo Woodward MD  Encounter Date: 2025   Encounter department: [facility]    :  Assessment & Plan  Annual physical exam         Acute bilateral low back pain with sciatica, sciatica laterality unspecified  Was following with pain management in [place]   Current Medications: Oxycodone 10 mg q6hrs PRN and Pregabalin   Is establishing care with    Will provide medications for patient until establish care   No further medications will provided from this clinic or from provider for pain management   Will do UDS   Orders:  •  pregabalin (LYRICA) 150 mg capsule; Take 1 capsule (150 mg total) by mouth in the morning and 1 capsule (150 mg total) at noon and 1 capsule (150 mg total) in the evening and 1 capsule (150 mg total) before bedtime. Do all this for 15 days.  •  oxyCODONE (ROXICODONE) 5 immediate release tablet; Take 1 tablet (5 mg total) by mouth every 4 (four) hours as needed for moderate pain Max Daily Amount: 30 mg  •  MISCELLANEOUS LAB TEST; Future    Neck pain    Orders:  •  pregabalin (LYRICA) 150 mg capsule; Take 1 capsule (150 mg total) by mouth in the morning and 1 capsule (150 mg total) at noon and 1 capsule (150 mg total) in the evening and 1 capsule (150 mg total) before bedtime. Do all this for 15 days.  •  oxyCODONE (ROXICODONE) 5 immediate release tablet; Take 1 tablet (5 mg total) by mouth every 4 (four) hours as needed for moderate pain Max Daily Amount: 30 mg    Brain aneurysm  Following with neurosurgery        Colon cancer screening    Orders:  •  Ambulatory referral to Gastroenterology; Future    Encounter for immunization    Orders:  •  TDAP VACCINE GREATER THAN OR EQUAL TO 8YO IM    Chronic obstructive pulmonary disease, unspecified COPD type (HCC)  Continues to be a smoker   Has not been on advair in awhile but has been worsening    Orders:  •  albuterol (Ventolin HFA) 90 mcg/act inhaler; Inhale 2 puffs every 6 (six) hours as needed for wheezing  •  Fluticasone-Salmeterol (Advair Diskus) 100-50 mcg/dose inhaler; Inhale 1 puff 2 (two) times a day Rinse mouth after use.    Encounter for screening for HIV    Orders:  •  HIV 1/2 AG/AB w Reflex [facility] for 2 yr old and above; Future    Need for hepatitis C screening test    Orders:  •  Hepatitis C antibody; Future        Preventive Screenings:  - Diabetes Screening: screening up-to-date  - Cholesterol Screening: screening up-to-date   - Hepatitis C screening: risks/benefits discussed and orders placed   - HIV screening: risks/benefits discussed and orders placed   - Colon cancer screening: risks/benefits discussed and orders placed   - Lung cancer screening: screening not indicated     Immunizations:  - Immunizations due: Prevnar 20      Depression Screening and Follow-up Plan: Patient was screened for depression during today's encounter. They screened negative with a PHQ-2 score of 1.          History of Present Illness     Adult Annual Physical:  Patient presents for annual physical.     Diet and Physical Activity:  - Diet/Nutrition: no special diet.  - Exercise: no formal exercise. physical theraphy    Depression Screening:  - PHQ-2 Score: 1    General Health:  - Sleep: sleeps poorly and 1-3 hours of sleep on average.  - Hearing: decreased hearing right ear and decreased hearing left ear.  - Vision: no vision problems.  - Dental: regular dental visits, brushes teeth once daily and floss regularly.     Health:  - History of STDs: no.   - Urinary symptoms: none.     Advanced Care Planning:  - Has an advanced directive?: no    - Has a durable medical POA?: no    - ACP document given to patient?: no      Review of Systems   Constitutional:  Negative for chills and fever.   HENT:  Negative for ear pain and sore throat.    Eyes:  Negative for pain and visual disturbance.   Respiratory:  Negative for  "cough and shortness of breath.    Cardiovascular:  Negative for chest pain and palpitations.   Gastrointestinal:  Negative for abdominal pain and vomiting.   Genitourinary:  Negative for dysuria and hematuria.   Musculoskeletal:  Negative for arthralgias and back pain.   Skin:  Negative for color change and rash.   Neurological:  Negative for seizures and syncope.   All other systems reviewed and are negative.        Objective   /90 (BP Location: Left arm, Patient Position: Sitting, Cuff Size: Large)   Pulse 92   Temp (!) 97.1 °F (36.2 °C) (Temporal)   Ht 5' 7.11\" (1.705 m)   Wt 74.9 kg (165 lb 3.2 oz)   SpO2 98%   BMI 25.79 kg/m²     Physical Exam  Vitals and nursing note reviewed.   Constitutional:       General: He is not in acute distress.     Appearance: He is well-developed.   HENT:      Head: Normocephalic and atraumatic.     Eyes:      Conjunctiva/sclera: Conjunctivae normal.       Cardiovascular:      Rate and Rhythm: Normal rate and regular rhythm.      Heart sounds: No murmur heard.  Pulmonary:      Effort: Pulmonary effort is normal. No respiratory distress.      Breath sounds: Examination of the right-middle field reveals wheezing. Examination of the left-middle field reveals wheezing. Examination of the left-lower field reveals wheezing. Wheezing present.   Abdominal:      Palpations: Abdomen is soft.      Tenderness: There is no abdominal tenderness.     Musculoskeletal:         General: No swelling.      Cervical back: Neck supple.     Skin:     General: Skin is warm and dry.      Capillary Refill: Capillary refill takes less than 2 seconds.     Neurological:      Mental Status: He is alert.     Psychiatric:         Mood and Affect: Mood normal.         "

## 2025-06-25 NOTE — ASSESSMENT & PLAN NOTE
Was following with pain management in [place]   Current Medications: Oxycodone 10 mg q6hrs PRN and Pregabalin   Is establishing care with July 9th   Will provide medications for patient until establish care   No further medications will provided from this clinic or from provider for pain management   Will do UDS   Orders:  •  pregabalin (LYRICA) 150 mg capsule; Take 1 capsule (150 mg total) by mouth in the morning and 1 capsule (150 mg total) at noon and 1 capsule (150 mg total) in the evening and 1 capsule (150 mg total) before bedtime. Do all this for 15 days.  •  oxyCODONE (ROXICODONE) 5 immediate release tablet; Take 1 tablet (5 mg total) by mouth every 4 (four) hours as needed for moderate pain Max Daily Amount: 30 mg  •  MISCELLANEOUS LAB TEST; Future

## 2025-06-26 RX ORDER — FLUTICASONE PROPIONATE AND SALMETEROL 100; 50 UG/1; UG/1
1 POWDER RESPIRATORY (INHALATION) 2 TIMES DAILY
Qty: 180 BLISTER | Refills: 1 | Status: SHIPPED | OUTPATIENT
Start: 2025-06-26

## 2025-07-03 ENCOUNTER — TELEPHONE (OUTPATIENT)
Age: 45
End: 2025-07-03

## 2025-07-11 ENCOUNTER — PREP FOR PROCEDURE (OUTPATIENT)
Age: 45
End: 2025-07-11

## 2025-07-11 ENCOUNTER — TELEPHONE (OUTPATIENT)
Age: 45
End: 2025-07-11

## 2025-07-11 DIAGNOSIS — Z12.11 SCREENING FOR COLON CANCER: Primary | ICD-10-CM

## 2025-07-11 NOTE — TELEPHONE ENCOUNTER
Scheduled date of colonoscopy (as of today):7/25/25  Physician performing colonoscopy:Dr Edwards   Location of colonoscopy:[facility]  Bowel prep reviewed with patient: reviewed yamini/dul prep instructions with pt, pt requesting instructions to be mailed to address on file.  Instructions reviewed with patient by:lydia  Clearances: na

## 2025-07-11 NOTE — TELEPHONE ENCOUNTER
07/11/25  Screened by: Sabine Iniguez    Referring Provider     Pre- Screening:     There is no height or weight on file to calculate BMI.25.79  Has patient been referred for a routine screening Colonoscopy? yes  Is the patient between 45-75 years old? yes      Previous Colonoscopy no   If yes:    Date:     Facility:     Reason:           Does the patient want to see a Gastroenterologist prior to their procedure OR are they having any GI symptoms? no    Has the patient been hospitalized or had abdominal surgery in the past 6 months? no    Does the patient use supplemental oxygen? no    Does the patient take Coumadin, Lovenox, Plavix, Elliquis, Xarelto, or other blood thinning medication? no    Has the patient had a stroke, cardiac event, or stent placed in the past year? no        If patient is between 45yrs - 49yrs, please advise patient that we will have to confirm benefits & coverage with their insurance company for a routine screening colonoscopy.

## 2025-07-23 ENCOUNTER — TELEPHONE (OUTPATIENT)
Age: 45
End: 2025-07-23

## 2025-07-23 NOTE — TELEPHONE ENCOUNTER
Read the miralax / Dulcolax prep instructions over the ph with the pt said he is good to confirm so confirmed his procedure.

## 2025-07-25 ENCOUNTER — ANESTHESIA (OUTPATIENT)
Dept: GASTROENTEROLOGY | Facility: HOSPITAL | Age: 45
End: 2025-07-25
Payer: COMMERCIAL

## 2025-07-25 ENCOUNTER — TELEPHONE (OUTPATIENT)
Dept: GASTROENTEROLOGY | Facility: CLINIC | Age: 45
End: 2025-07-25

## 2025-07-25 ENCOUNTER — TELEPHONE (OUTPATIENT)
Dept: GASTROENTEROLOGY | Facility: MEDICAL CENTER | Age: 45
End: 2025-07-25

## 2025-07-25 ENCOUNTER — ANESTHESIA EVENT (OUTPATIENT)
Dept: GASTROENTEROLOGY | Facility: HOSPITAL | Age: 45
End: 2025-07-25
Payer: COMMERCIAL

## 2025-07-25 ENCOUNTER — HOSPITAL ENCOUNTER (OUTPATIENT)
Dept: GASTROENTEROLOGY | Facility: HOSPITAL | Age: 45
Setting detail: OUTPATIENT SURGERY
End: 2025-07-25
Attending: INTERNAL MEDICINE
Payer: COMMERCIAL

## 2025-07-25 ENCOUNTER — PREP FOR PROCEDURE (OUTPATIENT)
Dept: GASTROENTEROLOGY | Facility: CLINIC | Age: 45
End: 2025-07-25

## 2025-07-25 VITALS
RESPIRATION RATE: 16 BRPM | TEMPERATURE: 97.2 F | BODY MASS INDEX: 24.98 KG/M2 | WEIGHT: 160 LBS | DIASTOLIC BLOOD PRESSURE: 59 MMHG | OXYGEN SATURATION: 99 % | HEART RATE: 51 BPM | SYSTOLIC BLOOD PRESSURE: 96 MMHG

## 2025-07-25 DIAGNOSIS — Z12.11 SCREENING FOR COLON CANCER: Primary | ICD-10-CM

## 2025-07-25 DIAGNOSIS — Z12.11 SCREENING FOR COLON CANCER: ICD-10-CM

## 2025-07-25 PROCEDURE — 45385 COLONOSCOPY W/LESION REMOVAL: CPT | Performed by: INTERNAL MEDICINE

## 2025-07-25 PROCEDURE — 88305 TISSUE EXAM BY PATHOLOGIST: CPT | Performed by: PATHOLOGY

## 2025-07-25 RX ORDER — LIDOCAINE HYDROCHLORIDE 20 MG/ML
INJECTION, SOLUTION EPIDURAL; INFILTRATION; INTRACAUDAL; PERINEURAL AS NEEDED
Status: DISCONTINUED | OUTPATIENT
Start: 2025-07-25 | End: 2025-07-25

## 2025-07-25 RX ORDER — SODIUM CHLORIDE, SODIUM LACTATE, POTASSIUM CHLORIDE, CALCIUM CHLORIDE 600; 310; 30; 20 MG/100ML; MG/100ML; MG/100ML; MG/100ML
125 INJECTION, SOLUTION INTRAVENOUS CONTINUOUS
Status: DISPENSED | OUTPATIENT
Start: 2025-07-25

## 2025-07-25 RX ORDER — SODIUM CHLORIDE, SODIUM LACTATE, POTASSIUM CHLORIDE, CALCIUM CHLORIDE 600; 310; 30; 20 MG/100ML; MG/100ML; MG/100ML; MG/100ML
INJECTION, SOLUTION INTRAVENOUS CONTINUOUS PRN
Status: DISCONTINUED | OUTPATIENT
Start: 2025-07-25 | End: 2025-07-25

## 2025-07-25 RX ORDER — PROPOFOL 10 MG/ML
INJECTION, EMULSION INTRAVENOUS AS NEEDED
Status: DISCONTINUED | OUTPATIENT
Start: 2025-07-25 | End: 2025-07-25

## 2025-07-25 RX ORDER — SODIUM CHLORIDE, SODIUM LACTATE, POTASSIUM CHLORIDE, CALCIUM CHLORIDE 600; 310; 30; 20 MG/100ML; MG/100ML; MG/100ML; MG/100ML
125 INJECTION, SOLUTION INTRAVENOUS CONTINUOUS
Status: CANCELLED | OUTPATIENT
Start: 2025-07-25

## 2025-07-25 RX ADMIN — LIDOCAINE HYDROCHLORIDE 60 MG: 20 INJECTION, SOLUTION EPIDURAL; INFILTRATION; INTRACAUDAL; PERINEURAL at 15:38

## 2025-07-25 RX ADMIN — SODIUM CHLORIDE, SODIUM LACTATE, POTASSIUM CHLORIDE, AND CALCIUM CHLORIDE 125 ML/HR: .6; .31; .03; .02 INJECTION, SOLUTION INTRAVENOUS at 14:35

## 2025-07-25 RX ADMIN — PROPOFOL 50 MG: 10 INJECTION, EMULSION INTRAVENOUS at 15:37

## 2025-07-25 RX ADMIN — PROPOFOL 40 MG: 10 INJECTION, EMULSION INTRAVENOUS at 15:43

## 2025-07-25 RX ADMIN — PROPOFOL 40 MG: 10 INJECTION, EMULSION INTRAVENOUS at 15:46

## 2025-07-25 RX ADMIN — PROPOFOL 50 MG: 10 INJECTION, EMULSION INTRAVENOUS at 15:38

## 2025-07-25 RX ADMIN — PROPOFOL 40 MG: 10 INJECTION, EMULSION INTRAVENOUS at 15:41

## 2025-07-25 NOTE — ANESTHESIA POSTPROCEDURE EVALUATION
Post-Op Assessment Note    CV Status:  Stable  Pain Score: 0    Pain management: adequate       Mental Status:  Sleepy   Hydration Status:  Euvolemic   PONV Controlled:  Controlled   Airway Patency:  Patent     Post Op Vitals Reviewed: Yes    No anethesia notable event occurred.    Staff: CRNA           Last Filed PACU Vitals:  Vitals Value Taken Time   Temp 96.8 °F (36 °C) 07/25/25 15:58   Pulse 61 07/25/25 15:58   BP 99/58 07/25/25 15:58   Resp 18 07/25/25 15:58   SpO2 98 % 07/25/25 15:58       Modified Everardo:     Vitals Value Taken Time   Activity 1 07/25/25 15:59   Respiration 2 07/25/25 15:59   Circulation 2 07/25/25 15:59   Consciousness 1 07/25/25 15:59   Oxygen Saturation 2 07/25/25 15:59     Modified Everardo Score: 8

## 2025-07-25 NOTE — TELEPHONE ENCOUNTER
Pt calling with general colonoscopy questions. Procedure is scheduled for today at 12:30 PM. All questions answered.

## 2025-07-25 NOTE — TELEPHONE ENCOUNTER
Pt calling fro GI lab parking lot. Unclear what happened per pt but he kept saying they keep telling me 10 minutes and I'm hungry and angry. Asked pt to hold while attempting to contact GI lab. Was able to talk to someone and they requested me to ask pt to go back to waiting room, which he said he would. No further needs at this time.   244.9

## 2025-07-25 NOTE — H&P
History and Physical -  [facility]  Jayjay Pollard 45 y.o. male MRN: [id number]                  HPI: Jayjay Pollard is a 45 y.o. year old male who presents for screening colonoscopy.      REVIEW OF SYSTEMS: Per the HPI, and otherwise unremarkable.    Historical Information   Past Medical History[1]  Past Surgical History[2]  Social History   Social History     Substance and Sexual Activity   Alcohol Use No     Social History     Substance and Sexual Activity   Drug Use Not Currently    Types: Marijuana    Comment: patient had medical marijuana      Tobacco Use History[3]  Family History[4]    Meds/Allergies     Current Medications[5]    Allergies[6]    Objective     /84   Pulse 78   Temp (!) 97.2 °F (36.2 °C) (Temporal)   Resp 16   Wt 72.6 kg (160 lb)   SpO2 99%   BMI 24.98 kg/m²       PHYSICAL EXAM    Gen: NAD  Head: NCAT  CV: RRR  CHEST: Clear  ABD: soft, NT/ND  EXT: no edema      ASSESSMENT/PLAN:  This is a 45 y.o. year old male here for colonoscopy, and he is stable and optimized for his procedure. We discussed potential adverse events related with the procedure. They verbalized their understanding and are in agreement with proceeding with the procedure.            [1]   Past Medical History:  Diagnosis Date    Anxiety     Asthma     Brain aneurysm     Chronic pain     back    COVID-19 2020    Depression     GERD (gastroesophageal reflux disease)    [2]   Past Surgical History:  Procedure Laterality Date    ARTHROSCOPY KNEE Left 11/8/2022    Procedure: Left knee arthroscopy, medial meniscus repair, possible medial meniscectomy;  Surgeon: Rashi Addison MD;  Location: [facility];  Service: Orthopedics    ARTHROSCOPY KNEE Left 2/13/2023    Procedure: Left Knee Arthroscopy, Partial Medial Meniscectomy, Removal of meniscal repair device.;  Surgeon: Rashi Addison MD;  Location:  MAIN OR;  Service: Orthopedics    BACK SURGERY      FL LUMBAR PUNCTURE  DIAGNOSTIC  5/8/2017    FL LUMBAR PUNCTURE DIAGNOSTIC  7/9/2017    LUMBAR DISCECTOMY  2015    & fusion; L5-S1   [3]   Social History  Tobacco Use   Smoking Status Every Day    Current packs/day: 0.25    Types: Cigarettes   Smokeless Tobacco Never   [4]   Family History  Problem Relation Name Age of Onset    Cancer Mother      Cancer Father      Diabetes Father      Diabetes Sister      No Known Problems Maternal Grandmother      No Known Problems Maternal Grandfather      No Known Problems Paternal Grandmother      Diabetes Paternal Grandfather     [5]   Current Outpatient Medications:     acetaminophen (TYLENOL) 500 mg tablet    albuterol (Ventolin HFA) 90 mcg/act inhaler    baclofen 10 mg tablet    celecoxib (CeleBREX) 100 mg capsule    famotidine (PEPCID) 20 mg tablet    meloxicam (MOBIC) 15 mg tablet    oxyCODONE (ROXICODONE) 5 immediate release tablet    topiramate (TOPAMAX) 25 mg tablet    diphenhydrAMINE (BENADRYL) 25 mg tablet    EPINEPHrine (EPIPEN) 0.3 mg/0.3 mL SOAJ    Fluticasone-Salmeterol (Advair) 100-50 mcg/dose inhaler    Lidocaine (HM Lidocaine Patch) 4 % PTCH    lidocaine (LIDODERM) 5 %    lidocaine (LMX) 4 % cream    naloxone (NARCAN) 4 mg/0.1 mL nasal spray    naproxen (EC NAPROSYN) 500 MG EC tablet    ondansetron (ZOFRAN) 4 mg tablet    pregabalin (LYRICA) 150 mg capsule    Current Facility-Administered Medications:     lactated ringers infusion, 125 mL/hr, Intravenous, Continuous, 125 mL/hr at 07/25/25 1435  [6]   Allergies  Allergen Reactions    Shellfish-Derived Products - Food Allergy Anaphylaxis

## 2025-07-25 NOTE — ANESTHESIA PREPROCEDURE EVALUATION
Procedure:  COLONOSCOPY    Relevant Problems   CARDIO   (+) Brain aneurysm      MUSCULOSKELETAL   (+) Bilateral low back pain with sciatica      PULMONARY   (+) Asthma with acute exacerbation   (+) Chronic obstructive pulmonary disease (HCC)   (+) Mild intermittent asthma without complication        Physical Exam    Airway     Mallampati score: I  TM Distance: >3 FB  Neck ROM: full  Mouth opening: >= 4 cm      Cardiovascular      Dental   No notable dental hx     Pulmonary      Neurological      Other Findings        Anesthesia Plan  ASA Score- 3     Anesthesia Type- IV sedation with anesthesia with ASA Monitors.         Additional Monitors:     Airway Plan: natural airway.           Plan Factors-    Chart reviewed.    Patient summary reviewed.                  Induction- intravenous.    Postoperative Plan- .   Monitoring Plan - Monitoring plan - standard ASA monitoring          Informed Consent- Anesthetic plan and risks discussed with patient.  I personally reviewed this patient with the CRNA. Discussed and agreed on the Anesthesia Plan with the CRNA..      NPO Status:  No vitals data found for the desired time range.

## 2025-07-31 PROCEDURE — 88305 TISSUE EXAM BY PATHOLOGIST: CPT | Performed by: PATHOLOGY

## 2025-08-01 ENCOUNTER — TELEPHONE (OUTPATIENT)
Age: 45
End: 2025-08-01

## 2025-08-05 ENCOUNTER — CONSULT (OUTPATIENT)
Dept: FAMILY MEDICINE CLINIC | Facility: CLINIC | Age: 45
End: 2025-08-05
Payer: COMMERCIAL

## 2025-08-05 VITALS
BODY MASS INDEX: 25.49 KG/M2 | TEMPERATURE: 98.3 F | WEIGHT: 162.4 LBS | DIASTOLIC BLOOD PRESSURE: 90 MMHG | HEART RATE: 96 BPM | SYSTOLIC BLOOD PRESSURE: 132 MMHG | HEIGHT: 67 IN | OXYGEN SATURATION: 98 %

## 2025-08-05 DIAGNOSIS — J44.9 CHRONIC OBSTRUCTIVE PULMONARY DISEASE, UNSPECIFIED COPD TYPE (HCC): ICD-10-CM

## 2025-08-05 DIAGNOSIS — M51.379 DEGENERATION OF INTERVERTEBRAL DISC OF LUMBOSACRAL REGION, UNSPECIFIED WHETHER PAIN PRESENT: ICD-10-CM

## 2025-08-05 DIAGNOSIS — J30.9 ALLERGIC RHINITIS, UNSPECIFIED SEASONALITY, UNSPECIFIED TRIGGER: ICD-10-CM

## 2025-08-05 DIAGNOSIS — M54.16 LUMBAR RADICULOPATHY: ICD-10-CM

## 2025-08-05 DIAGNOSIS — M47.817 SPONDYLOSIS OF LUMBOSACRAL REGION, UNSPECIFIED SPINAL OSTEOARTHRITIS COMPLICATION STATUS: ICD-10-CM

## 2025-08-05 DIAGNOSIS — Z01.818 PREOP EXAMINATION: Primary | ICD-10-CM

## 2025-08-05 PROBLEM — J45.20 MILD INTERMITTENT ASTHMA WITHOUT COMPLICATION: Status: RESOLVED | Noted: 2020-04-26 | Resolved: 2025-08-05

## 2025-08-05 PROBLEM — U07.1 COVID-19 VIRUS INFECTION: Status: RESOLVED | Noted: 2020-04-25 | Resolved: 2025-08-05

## 2025-08-05 PROCEDURE — 99243 OFF/OP CNSLTJ NEW/EST LOW 30: CPT

## 2025-08-05 RX ORDER — FLUTICASONE PROPIONATE 50 MCG
1 SPRAY, SUSPENSION (ML) NASAL DAILY
Qty: 15.8 ML | Refills: 2 | Status: SHIPPED | OUTPATIENT
Start: 2025-08-05

## 2025-08-05 RX ORDER — FLUTICASONE PROPIONATE AND SALMETEROL 100; 50 UG/1; UG/1
1 POWDER RESPIRATORY (INHALATION) 2 TIMES DAILY
Qty: 180 BLISTER | Refills: 1 | Status: SHIPPED | OUTPATIENT
Start: 2025-08-05

## 2025-08-05 RX ORDER — CETIRIZINE HYDROCHLORIDE 10 MG/1
10 TABLET ORAL DAILY
Qty: 90 TABLET | Refills: 0 | Status: SHIPPED | OUTPATIENT
Start: 2025-08-05

## (undated) DEVICE — VAPR COOLPULSE 90 ELECTRODE 90 DEGREES SUCTION WITH INTEGRATED HANDPIECE: Brand: VAPR COOLPULSE

## (undated) DEVICE — ABDOMINAL PAD: Brand: DERMACEA

## (undated) DEVICE — BETHLEHEM UNIVERSAL  ARTHRO PK: Brand: CARDINAL HEALTH

## (undated) DEVICE — NEEDLE 21 G X 1 1/2 SAFETY

## (undated) DEVICE — 3M™ STERI-STRIP™ REINFORCED ADHESIVE SKIN CLOSURES, R1547, 1/2 IN X 4 IN (12 MM X 100 MM), 6 STRIPS/ENVELOPE: Brand: 3M™ STERI-STRIP™

## (undated) DEVICE — LIGHT GLOVE GREEN

## (undated) DEVICE — BLADE SHAVER TORPEDO CRV 4MM 13MM COOLCUT

## (undated) DEVICE — SUT ETHILON 3-0 PS-1 18 IN 1663G

## (undated) DEVICE — 4-PORT MANIFOLD: Brand: NEPTUNE 2

## (undated) DEVICE — NEEDLE BLUNT 18 G X 1 1/2IN

## (undated) DEVICE — STERILE POLYISOPRENE POWDER-FREE SURGICAL GLOVES WITH EMOLLIENT COATING: Brand: PROTEXIS

## (undated) DEVICE — SCD SEQUENTIAL COMPRESSION COMFORT SLEEVE MEDIUM KNEE LENGTH: Brand: KENDALL SCD

## (undated) DEVICE — GLOVE INDICATOR PI UNDERGLOVE SZ 8 BLUE

## (undated) DEVICE — NEEDLE 22 G X 1 1/2 SAFETY

## (undated) DEVICE — DISPOSABLE OR TOWEL: Brand: CARDINAL HEALTH

## (undated) DEVICE — STOCKINETTE,IMPERVIOUS,12X48,STERILE: Brand: MEDLINE

## (undated) DEVICE — STERILE POLYISOPRENE POWDER-FREE SURGICAL GLOVES: Brand: PROTEXIS

## (undated) DEVICE — BOWL: 16OZ PEELPOUCH 75/CS 16/PLT: Brand: MEDEGEN MEDICAL PRODUCTS, LLC

## (undated) DEVICE — STRAP LITHOTOMY CANDY CANE

## (undated) DEVICE — TUBING SUCTION 5MM X 12 FT

## (undated) DEVICE — GAUZE SPONGES,16 PLY: Brand: CURITY

## (undated) DEVICE — CHLORAPREP HI-LITE 26ML ORANGE

## (undated) DEVICE — ACE WRAP 6 IN STERILE

## (undated) DEVICE — SET,IRRIGATION,CYSTO,Y-TYPE,90": Brand: MEDLINE

## (undated) DEVICE — COBAN 6 IN STERILE

## (undated) DEVICE — AMBIENT COVAC 50 IFS: Brand: COBLATION

## (undated) DEVICE — SYRINGE 30ML LL

## (undated) DEVICE — 3M™ STERI-DRAPE™ U-DRAPE 1015: Brand: STERI-DRAPE™

## (undated) DEVICE — PADDING CAST 6IN COTTON STRL

## (undated) DEVICE — INTENDED FOR TISSUE SEPARATION, AND OTHER PROCEDURES THAT REQUIRE A SHARP SURGICAL BLADE TO PUNCTURE OR CUT.: Brand: BARD-PARKER ® CARBON RIB-BACK BLADES

## (undated) DEVICE — EXTREMITY DRAPE W/ARMBOARD COVERS: Brand: CONVERTORS

## (undated) DEVICE — ARTHROSCOPY FLOOR MAT

## (undated) DEVICE — Device

## (undated) DEVICE — CONMED SUCTION CONNECTING TUBING, 20' LONG (6.1 M), 9/32" I.D. (7.1 MM): Brand: CONMED

## (undated) DEVICE — SUT ETHILON 3-0 PS-1 18 IN 1663H

## (undated) DEVICE — PAD CAST 6 IN COTTON NON STERILE

## (undated) DEVICE — TOWEL SURG XR DETECT GREEN STRL RFD

## (undated) DEVICE — OCCLUSIVE GAUZE STRIP,3% BISMUTH TRIBROMOPHENATE IN PETROLATUM BLEND: Brand: XEROFORM

## (undated) DEVICE — SUT MONOCRYL 4-0 PS-2 27 IN Y426H

## (undated) DEVICE — BLADE SHAVER TORPEDO 4MM 13CM  COOLCUT

## (undated) DEVICE — DRAPE SHEET THREE QUARTER

## (undated) DEVICE — CURITY NON-ADHERENT STRIPS: Brand: CURITY

## (undated) DEVICE — KNOT PUSHER/SUTURE CUTTER W/ PORTAL SKID BUNDLE

## (undated) DEVICE — STIRRUP STRAP ADULT DISP

## (undated) DEVICE — 3M™ IOBAN™ 2 ANTIMICROBIAL INCISE DRAPE 6650EZ: Brand: IOBAN™ 2

## (undated) DEVICE — HALF SHEET: Brand: CONVERTORS

## (undated) DEVICE — ACE WRAP 6 IN UNSTERILE

## (undated) DEVICE — GLOVE SRG BIOGEL 8

## (undated) DEVICE — INTENDED FOR TISSUE SEPARATION, AND OTHER PROCEDURES THAT REQUIRE A SHARP SURGICAL BLADE TO PUNCTURE OR CUT.: Brand: BARD-PARKER ® SAFETYLOCK CARBON RIB-BACK BLADES